# Patient Record
Sex: FEMALE | Race: WHITE | NOT HISPANIC OR LATINO
[De-identification: names, ages, dates, MRNs, and addresses within clinical notes are randomized per-mention and may not be internally consistent; named-entity substitution may affect disease eponyms.]

---

## 2017-01-10 ENCOUNTER — OTHER (OUTPATIENT)
Age: 42
End: 2017-01-10

## 2017-01-10 ENCOUNTER — APPOINTMENT (OUTPATIENT)
Dept: HEART AND VASCULAR | Facility: CLINIC | Age: 42
End: 2017-01-10

## 2017-01-10 VITALS
HEART RATE: 66 BPM | OXYGEN SATURATION: 99 % | BODY MASS INDEX: 18.81 KG/M2 | DIASTOLIC BLOOD PRESSURE: 62 MMHG | WEIGHT: 127 LBS | SYSTOLIC BLOOD PRESSURE: 100 MMHG | HEIGHT: 69 IN

## 2017-01-10 DIAGNOSIS — Z84.89 FAMILY HISTORY OF OTHER SPECIFIED CONDITIONS: ICD-10-CM

## 2017-01-10 DIAGNOSIS — F41.9 ANXIETY DISORDER, UNSPECIFIED: ICD-10-CM

## 2017-01-11 PROBLEM — Z84.89 FAMILY HISTORY OF SUDDEN DEATH: Status: ACTIVE | Noted: 2017-01-11

## 2017-01-11 PROBLEM — F41.9 ANXIETY: Status: RESOLVED | Noted: 2017-01-11 | Resolved: 2017-01-11

## 2017-01-30 ENCOUNTER — APPOINTMENT (OUTPATIENT)
Dept: HEART AND VASCULAR | Facility: CLINIC | Age: 42
End: 2017-01-30

## 2017-02-28 ENCOUNTER — APPOINTMENT (OUTPATIENT)
Dept: HEART AND VASCULAR | Facility: CLINIC | Age: 42
End: 2017-02-28

## 2017-03-07 ENCOUNTER — APPOINTMENT (OUTPATIENT)
Dept: HEART AND VASCULAR | Facility: CLINIC | Age: 42
End: 2017-03-07

## 2019-02-27 ENCOUNTER — APPOINTMENT (OUTPATIENT)
Dept: HEART AND VASCULAR | Facility: CLINIC | Age: 44
End: 2019-02-27
Payer: COMMERCIAL

## 2019-03-12 ENCOUNTER — APPOINTMENT (OUTPATIENT)
Dept: HEART AND VASCULAR | Facility: CLINIC | Age: 44
End: 2019-03-12
Payer: COMMERCIAL

## 2019-03-12 NOTE — PHYSICAL EXAM
[General Appearance - Well Developed] : well developed [Normal Appearance] : normal appearance [Well Groomed] : well groomed [General Appearance - Well Nourished] : well nourished [No Deformities] : no deformities [General Appearance - In No Acute Distress] : no acute distress [Normal Conjunctiva] : the conjunctiva exhibited no abnormalities [Eyelids - No Xanthelasma] : the eyelids demonstrated no xanthelasmas [Normal Jugular Venous A Waves Present] : normal jugular venous A waves present [Normal Jugular Venous V Waves Present] : normal jugular venous V waves present [No Jugular Venous Campos A Waves] : no jugular venous campos A waves [Respiration, Rhythm And Depth] : normal respiratory rhythm and effort [Exaggerated Use Of Accessory Muscles For Inspiration] : no accessory muscle use [Auscultation Breath Sounds / Voice Sounds] : lungs were clear to auscultation bilaterally [Heart Rate And Rhythm] : heart rate and rhythm were normal [Heart Sounds] : normal S1 and S2 [Murmurs] : no murmurs present [Abdomen Soft] : soft [Abdomen Tenderness] : non-tender [Abdomen Mass (___ Cm)] : no abdominal mass palpated [Abnormal Walk] : normal gait [Gait - Sufficient For Exercise Testing] : the gait was sufficient for exercise testing [Nail Clubbing] : no clubbing of the fingernails [Cyanosis, Localized] : no localized cyanosis [Petechial Hemorrhages (___cm)] : no petechial hemorrhages [Skin Color & Pigmentation] : normal skin color and pigmentation [] : no rash [No Venous Stasis] : no venous stasis [Skin Lesions] : no skin lesions [No Skin Ulcers] : no skin ulcer [No Xanthoma] : no  xanthoma was observed [Oriented To Time, Place, And Person] : oriented to person, place, and time [Affect] : the affect was normal [Mood] : the mood was normal [No Anxiety] : not feeling anxious

## 2019-03-12 NOTE — REASON FOR VISIT
[Chest Pain] : chest pain [Follow-Up - Clinic] : a clinic follow-up of [Hyperlipidemia] : hyperlipidemia [Hypertension] : hypertension

## 2019-03-13 ENCOUNTER — APPOINTMENT (OUTPATIENT)
Dept: HEART AND VASCULAR | Facility: CLINIC | Age: 44
End: 2019-03-13
Payer: COMMERCIAL

## 2019-03-13 NOTE — REASON FOR VISIT
[Follow-Up - Clinic] : a clinic follow-up of [Dyspnea] : dyspnea [Hyperlipidemia] : hyperlipidemia [Hypertension] : hypertension

## 2019-04-02 ENCOUNTER — NON-APPOINTMENT (OUTPATIENT)
Age: 44
End: 2019-04-02

## 2019-04-02 ENCOUNTER — APPOINTMENT (OUTPATIENT)
Dept: RHEUMATOLOGY | Facility: CLINIC | Age: 44
End: 2019-04-02
Payer: COMMERCIAL

## 2019-04-02 ENCOUNTER — APPOINTMENT (OUTPATIENT)
Dept: HEART AND VASCULAR | Facility: CLINIC | Age: 44
End: 2019-04-02
Payer: COMMERCIAL

## 2019-04-02 VITALS — OXYGEN SATURATION: 99 % | HEART RATE: 79 BPM | DIASTOLIC BLOOD PRESSURE: 73 MMHG | SYSTOLIC BLOOD PRESSURE: 113 MMHG

## 2019-04-02 VITALS
HEIGHT: 69 IN | HEART RATE: 79 BPM | BODY MASS INDEX: 20.29 KG/M2 | DIASTOLIC BLOOD PRESSURE: 72 MMHG | WEIGHT: 137 LBS | TEMPERATURE: 98.9 F | SYSTOLIC BLOOD PRESSURE: 110 MMHG | OXYGEN SATURATION: 99 %

## 2019-04-02 DIAGNOSIS — D50.9 IRON DEFICIENCY ANEMIA, UNSPECIFIED: ICD-10-CM

## 2019-04-02 DIAGNOSIS — D51.0 VITAMIN B12 DEFICIENCY ANEMIA DUE TO INTRINSIC FACTOR DEFICIENCY: ICD-10-CM

## 2019-04-02 DIAGNOSIS — Z86.2 PERSONAL HISTORY OF DISEASES OF THE BLOOD AND BLOOD-FORMING ORGANS AND CERTAIN DISORDERS INVOLVING THE IMMUNE MECHANISM: ICD-10-CM

## 2019-04-02 DIAGNOSIS — R76.8 OTHER SPECIFIED ABNORMAL IMMUNOLOGICAL FINDINGS IN SERUM: ICD-10-CM

## 2019-04-02 DIAGNOSIS — Z82.62 FAMILY HISTORY OF OSTEOPOROSIS: ICD-10-CM

## 2019-04-02 DIAGNOSIS — Z87.2 PERSONAL HISTORY OF DISEASES OF THE SKIN AND SUBCUTANEOUS TISSUE: ICD-10-CM

## 2019-04-02 DIAGNOSIS — Z82.49 FAMILY HISTORY OF ISCHEMIC HEART DISEASE AND OTHER DISEASES OF THE CIRCULATORY SYSTEM: ICD-10-CM

## 2019-04-02 DIAGNOSIS — K58.9 IRRITABLE BOWEL SYNDROME W/OUT DIARRHEA: ICD-10-CM

## 2019-04-02 DIAGNOSIS — Z78.9 OTHER SPECIFIED HEALTH STATUS: ICD-10-CM

## 2019-04-02 PROCEDURE — 99214 OFFICE O/P EST MOD 30 MIN: CPT

## 2019-04-02 PROCEDURE — 93000 ELECTROCARDIOGRAM COMPLETE: CPT

## 2019-04-02 PROCEDURE — 99205 OFFICE O/P NEW HI 60 MIN: CPT | Mod: 25

## 2019-04-02 PROCEDURE — 36415 COLL VENOUS BLD VENIPUNCTURE: CPT

## 2019-04-02 NOTE — ASSESSMENT
[FreeTextEntry1] : 45 y/o F with uncontrolled depression, anxiety and OCD, daily alcohol consumption was referred by Dermatology Dr. Fritz ([facility] ) \par She was seen by dermatology due to alopecia, which she reports non patchy, DELVIN was tested and reported positive, requested labs from PMD done  on 03/21. ROS: except nonpatchy alopecia is negative for underlying autoimmune diseases, pt with no arthralgia/arthritis, sicca symptoms, skin rash or photosensitivity. \par Exam is unremarkable\par DELVIN 1:40 \par Has normal ESR 16 and CRP <5 ( normal <10mg/dl) \par CCP 16 ( <20 negative) RF negative. Mild anemia with Hg 11.  \par Most likely alopecia is not related to any underlying autoimmune disease and I am suspecting stress induced. \par \par \par 1. The significance of positive DELVIN was discussed with the patient in great detail. I will check second tier serology as outlined below. Positive DELVIN may be seen in the setting of various autoimmune diseases including but not limited to SLE, rheumatoid arthritis, Sjogren's syndrome, autoimmune thyroid disease, scleroderma and others. DELVIN may  also be present at healthy individuals. \par 2. Recommended to contact PMD to discuss initiation of Iron supplements. \par 3. Alopecia: recommended hair and nail supplements and Biotin. \par 4. Vitamin D deficiency: no confirmatory lab, pt to send me most recent level and if low can start Ergocalciferol. \par 5. Depression and anxiety: recommended to consider starting Prozac as suggested to control her symptoms.\par 6. Alcohol dependence: we talked about alcohol related health problems, including but not limited to osteoporosis, liver disease and cirrhosis, heart diseases and ways to solve this problem, pt is self trying to cut down alcohol intake. \par \par Will see her back in 2 weeks to review labs. \par \par

## 2019-04-02 NOTE — REASON FOR VISIT
[Follow-Up - Clinic] : a clinic follow-up of [Chest Pain] : chest pain [Palpitations] : palpitations [FreeTextEntry1] : Presents secondary to chest discomfort on occasion. Notes activity with activity and rest and attributes them to anxiety. She remarks on a heart murmur noted by her doctor in [place]. No recent testing. Occasional palpitations noted. No syncope.

## 2019-04-02 NOTE — REVIEW OF SYSTEMS
[Feeling Tired] : feeling tired [Recent Weight Gain (___ Lbs)] : recent [unfilled] ~Ulb weight gain [Hoarseness] : hoarseness [Heart Rate Is Fast] : fast heart rate [Lower Ext Edema] : lower extremity edema [Constipation] : constipation [Anxiety] : anxiety [Depression] : depression [Fever] : no fever [Chills] : no chills [Feeling Poorly] : not feeling poorly [Recent Weight Loss (___ Lbs)] : no recent weight loss [Eye Pain] : no eye pain [Red Eyes] : eyes not red [Eyesight Problems] : no eyesight problems [Discharge From Eyes] : no purulent discharge from the eyes [Dry Eyes] : no dryness of the eyes [Eyes Itch] : no itching of the eyes [Earache] : no earache [Loss Of Hearing] : no hearing loss [Nosebleeds] : no nosebleeds [Nasal Discharge] : no nasal discharge [Sore Throat] : no sore throat [Heart Rate Is Slow] : the heart rate was not slow [Chest Pain] : no chest pain [Palpitations] : no palpitations [Leg Claudication] : no intermittent leg claudication [Shortness Of Breath] : no shortness of breath [Wheezing] : no wheezing [Cough] : no cough [SOB on Exertion] : no shortness of breath during exertion [Orthopnea] : no orthopnea [PND] : no PND [Abdominal Pain] : no abdominal pain [Vomiting] : no vomiting [Diarrhea] : no diarrhea [Arthralgias] : no arthralgias [Joint Pain] : no joint pain [Joint Swelling] : no joint swelling [Joint Stiffness] : no joint stiffness [Limb Pain] : no limb pain [Limb Swelling] : no limb swelling [Skin Lesions] : no skin lesions [Skin Wound] : no skin wound [Itching] : no itching [Change In A Mole] : no change in a mole [Confused] : no confusion [Convulsions] : no convulsions [Dizziness] : no dizziness [Fainting] : no fainting [Hot Flashes] : no hot flashes [Easy Bleeding] : no tendency for easy bleeding [Easy Bruising] : no tendency for easy bruising [Swollen Glands] : no swollen glands [Swollen Glands In The Neck] : no swollen glands in the neck

## 2019-04-02 NOTE — PHYSICAL EXAM
[General Appearance - Alert] : alert [General Appearance - In No Acute Distress] : in no acute distress [General Appearance - Well Nourished] : well nourished [General Appearance - Well Developed] : well developed [Sclera] : the sclera and conjunctiva were normal [PERRL With Normal Accommodation] : pupils were equal in size, round, and reactive to light [Extraocular Movements] : extraocular movements were intact [Outer Ear] : the ears and nose were normal in appearance [Hearing Threshold Finger Rub Not [place]] : hearing was normal [Examination Of The Oral Cavity] : the lips and gums were normal [Oropharynx] : the oropharynx was normal [Neck Appearance] : the appearance of the neck was normal [Neck Cervical Mass (___cm)] : no neck mass was observed [Jugular Venous Distention Increased] : there was no jugular-venous distention [Thyroid Diffuse Enlargement] : the thyroid was not enlarged [Thyroid Nodule] : there were no palpable thyroid nodules [Auscultation Breath Sounds / Voice Sounds] : lungs were clear to auscultation bilaterally [Heart Rate And Rhythm] : heart rate was normal and rhythm regular [Heart Sounds] : normal S1 and S2 [Murmurs] : no murmurs [Heart Sounds Pericardial Friction Rub] : no pericardial rub [Arterial Pulses Carotid] : carotid pulses were normal with no bruits [Edema] : there was no peripheral edema [Abdomen Soft] : soft [Abdomen Tenderness] : non-tender [Abdomen Mass (___ Cm)] : no abdominal mass palpated [Cervical Lymph Nodes Enlarged Posterior Bilaterally] : posterior cervical [Cervical Lymph Nodes Enlarged Anterior Bilaterally] : anterior cervical [Supraclavicular Lymph Nodes Enlarged Bilaterally] : supraclavicular [Abnormal Walk] : normal gait [Nail Clubbing] : no clubbing  or cyanosis of the fingernails [Musculoskeletal - Swelling] : no joint swelling seen [Motor Tone] : muscle strength and tone were normal [Skin Color & Pigmentation] : normal skin color and pigmentation [Skin Turgor] : normal skin turgor [] : no rash [Skin Lesions] : no skin lesions [Sensation] : the sensory exam was normal to light touch and pinprick [Motor Exam] : the motor exam was normal [Affect] : the affect was normal [Mood] : the mood was normal [FreeTextEntry1] : nail bed capillaries normal, not dilated. Hair loss, but no patchy alopecia.

## 2019-04-02 NOTE — HISTORY OF PRESENT ILLNESS
[Fatigue] : fatigue [Depression] : depression [Visual Changes] : visual changes [FreeTextEntry1] : 43 y/o F was referred by Dermatology Dr. Fritz ( [facility] ) \par PMD:  Dr. Claudio Walsh at [facility]. \par She was seen by dermatology due to alopecia, which she reports non patchy, DELVIN was tested and reported positive and recommended to see rheumatology. \par She was recently seen by Dr. Gomes for cardiac evaluation. \par Pt active problems are  depression and anxiety, OCD got worse since death of the brother few years ago, periodic panic attacks, she drinks alcohol daily (3-4 glass of wine daily)  seeing a therapist   recommended Prozac, but she has not yet decided. She has hx of pernicious anemia, iron deficiency anemia, IBS and GERD. Works part time as . \par ROS: denies patchy alopecia, photosensitivity, malar rash or other type of rash. NO Raynaud's disease, skin thickness or tightness. \par Denies arthralgia/myalgi. \par NO family hx of Autoimmune disease\par Mother has Osteoporosis and osteoporotic fracture. \par Reports had few times psoriatic plaques when she had stressful event in her life, but no recurrence for years.  [Anorexia] : no anorexia [Weight Loss] : no weight loss [Malaise] : no malaise [Fever] : no fever [Chills] : no chills [Malar Facial Rash] : no malar facial rash [Skin Lesions] : no lesions [Skin Nodules] : no skin nodules [Oral Ulcers] : no oral ulcers [Cough] : no cough [Dry Mouth] : no dry mouth [Dysphonia] : no dysphonia [Dysphagia] : no dysphagia [Shortness of Breath] : no shortness of breath [Chest Pain] : no chest pain [Arthralgias] : no arthralgias [Joint Swelling] : no joint swelling [Joint Warmth] : no joint warmth [Joint Deformity] : no joint deformity [Decreased ROM] : no decreased range of motion [Morning Stiffness] : no morning stiffness [Falls] : no falls [Difficulty Standing] : no difficulty standing [Difficulty Walking] : no difficulty walking [Myalgias] : no myalgias [Muscle Weakness] : no muscle weakness [Muscle Spasms] : no muscle spasms [Muscle Cramping] : no muscle cramping [Eye Pain] : no eye pain [Eye Redness] : no eye redness [Dry Eyes] : no dry eyes

## 2019-04-02 NOTE — PHYSICAL EXAM
[General Appearance - Well Developed] : well developed [Normal Appearance] : normal appearance [Well Groomed] : well groomed [General Appearance - Well Nourished] : well nourished [No Deformities] : no deformities [General Appearance - In No Acute Distress] : no acute distress [Normal Conjunctiva] : the conjunctiva exhibited no abnormalities [Eyelids - No Xanthelasma] : the eyelids demonstrated no xanthelasmas [Normal Jugular Venous A Waves Present] : normal jugular venous A waves present [Normal Jugular Venous V Waves Present] : normal jugular venous V waves present [No Jugular Venous Campos A Waves] : no jugular venous campos A waves [Respiration, Rhythm And Depth] : normal respiratory rhythm and effort [Exaggerated Use Of Accessory Muscles For Inspiration] : no accessory muscle use [Auscultation Breath Sounds / Voice Sounds] : lungs were clear to auscultation bilaterally [Heart Rate And Rhythm] : heart rate and rhythm were normal [Heart Sounds] : normal S1 and S2 [Abdomen Soft] : soft [Abdomen Tenderness] : non-tender [Abdomen Mass (___ Cm)] : no abdominal mass palpated [Abnormal Walk] : normal gait [Gait - Sufficient For Exercise Testing] : the gait was sufficient for exercise testing [Nail Clubbing] : no clubbing of the fingernails [Cyanosis, Localized] : no localized cyanosis [Petechial Hemorrhages (___cm)] : no petechial hemorrhages [Skin Color & Pigmentation] : normal skin color and pigmentation [] : no rash [No Venous Stasis] : no venous stasis [Skin Lesions] : no skin lesions [No Skin Ulcers] : no skin ulcer [No Xanthoma] : no  xanthoma was observed [Oriented To Time, Place, And Person] : oriented to person, place, and time [Affect] : the affect was normal [Mood] : the mood was normal [No Anxiety] : not feeling anxious [Normal Oral Mucosa] : normal oral mucosa [No Oral Pallor] : no oral pallor [No Oral Cyanosis] : no oral cyanosis [FreeTextEntry1] : BA 2/6

## 2019-04-03 LAB
APPEARANCE: CLEAR
BACTERIA: ABNORMAL
BILIRUBIN URINE: NEGATIVE
BLOOD URINE: ABNORMAL
C3 SERPL-MCNC: 103 MG/DL
C4 SERPL-MCNC: 18 MG/DL
COLOR: YELLOW
CREAT SPEC-SCNC: 167 MG/DL
CREAT/PROT UR: 0.1 RATIO
GLUCOSE QUALITATIVE U: NEGATIVE
HYALINE CASTS: 6 /LPF
KETONES URINE: ABNORMAL
LEUKOCYTE ESTERASE URINE: NEGATIVE
MICROSCOPIC-UA: NORMAL
NITRITE URINE: NEGATIVE
PH URINE: 5.5
PROT UR-MCNC: 20 MG/DL
PROTEIN URINE: NORMAL
RED BLOOD CELLS URINE: 5 /HPF
SPECIFIC GRAVITY URINE: 1.02
SQUAMOUS EPITHELIAL CELLS: 4 /HPF
UROBILINOGEN URINE: NORMAL
WHITE BLOOD CELLS URINE: 2 /HPF

## 2019-04-04 LAB
DSDNA AB SER-ACNC: <12 IU/ML
ENA RNP AB SER IA-ACNC: <0.2 AL
ENA SM AB SER IA-ACNC: <0.2 AL
ENA SS-A AB SER IA-ACNC: <0.2 AL
ENA SS-B AB SER IA-ACNC: <0.2 AL

## 2019-04-12 ENCOUNTER — APPOINTMENT (OUTPATIENT)
Dept: HEART AND VASCULAR | Facility: CLINIC | Age: 44
End: 2019-04-12

## 2019-04-23 ENCOUNTER — APPOINTMENT (OUTPATIENT)
Dept: RHEUMATOLOGY | Facility: CLINIC | Age: 44
End: 2019-04-23
Payer: COMMERCIAL

## 2019-04-23 VITALS
BODY MASS INDEX: 20.29 KG/M2 | OXYGEN SATURATION: 97 % | SYSTOLIC BLOOD PRESSURE: 128 MMHG | TEMPERATURE: 98.4 F | DIASTOLIC BLOOD PRESSURE: 82 MMHG | WEIGHT: 137 LBS | HEIGHT: 69 IN | HEART RATE: 83 BPM | RESPIRATION RATE: 16 BRPM

## 2019-04-23 DIAGNOSIS — L65.9 NONSCARRING HAIR LOSS, UNSPECIFIED: ICD-10-CM

## 2019-04-23 DIAGNOSIS — R76.8 OTHER SPECIFIED ABNORMAL IMMUNOLOGICAL FINDINGS IN SERUM: ICD-10-CM

## 2019-04-23 DIAGNOSIS — R31.29 OTHER MICROSCOPIC HEMATURIA: ICD-10-CM

## 2019-04-23 PROCEDURE — 99214 OFFICE O/P EST MOD 30 MIN: CPT

## 2019-04-23 NOTE — PHYSICAL EXAM
[General Appearance - Alert] : alert [General Appearance - In No Acute Distress] : in no acute distress [General Appearance - Well Nourished] : well nourished [General Appearance - Well Developed] : well developed [Sclera] : the sclera and conjunctiva were normal [PERRL With Normal Accommodation] : pupils were equal in size, round, and reactive to light [Extraocular Movements] : extraocular movements were intact [Outer Ear] : the ears and nose were normal in appearance [Hearing Threshold Finger Rub Not [place]] : hearing was normal [Examination Of The Oral Cavity] : the lips and gums were normal [Neck Appearance] : the appearance of the neck was normal [Oropharynx] : the oropharynx was normal [Jugular Venous Distention Increased] : there was no jugular-venous distention [Neck Cervical Mass (___cm)] : no neck mass was observed [Thyroid Diffuse Enlargement] : the thyroid was not enlarged [Thyroid Nodule] : there were no palpable thyroid nodules [Auscultation Breath Sounds / Voice Sounds] : lungs were clear to auscultation bilaterally [Heart Rate And Rhythm] : heart rate was normal and rhythm regular [Heart Sounds Pericardial Friction Rub] : no pericardial rub [Murmurs] : no murmurs [Heart Sounds] : normal S1 and S2 [Edema] : there was no peripheral edema [Arterial Pulses Carotid] : carotid pulses were normal with no bruits [Abdomen Tenderness] : non-tender [Abdomen Soft] : soft [Cervical Lymph Nodes Enlarged Posterior Bilaterally] : posterior cervical [Abdomen Mass (___ Cm)] : no abdominal mass palpated [Supraclavicular Lymph Nodes Enlarged Bilaterally] : supraclavicular [Cervical Lymph Nodes Enlarged Anterior Bilaterally] : anterior cervical [Nail Clubbing] : no clubbing  or cyanosis of the fingernails [Abnormal Walk] : normal gait [Musculoskeletal - Swelling] : no joint swelling seen [Skin Color & Pigmentation] : normal skin color and pigmentation [Motor Tone] : muscle strength and tone were normal [Skin Lesions] : no skin lesions [] : no rash [Skin Turgor] : normal skin turgor [Motor Exam] : the motor exam was normal [Sensation] : the sensory exam was normal to light touch and pinprick [Affect] : the affect was normal [Mood] : the mood was normal [FreeTextEntry1] : nail bed capillaries normal, not dilated. Hair loss, but no patchy alopecia.

## 2019-04-23 NOTE — ASSESSMENT
[FreeTextEntry1] : 45 y/o F with uncontrolled depression, anxiety and OCD, daily alcohol consumption was referred by Dermatology Dr. Fritz ([facility] ) \par She was seen by dermatology due to alopecia, which she reports non patchy, DELVIN was tested and reported positive, requested labs from PMD done  on 03/21. ROS: except nonpatchy alopecia is negative for underlying autoimmune diseases, pt with no arthralgia/arthritis, sicca symptoms, skin rash or photosensitivity. \par Exam is unremarkable\par DELVIN 1:40 \par Has normal ESR 16 and CRP <5 ( normal <10mg/dl) \par CCP 16 ( <20 negative) RF negative. Mild anemia with Hg 11.  \par Most likely alopecia is not related to any underlying autoimmune disease and I am suspecting stress induced  \par \par \par 1. The significance of positive DELVIN was discussed with the patient in great detail,  second tier serology such as C3,C4, ds-DNA, JESÚS and Ssa/Ssb all negative. \par Low positive titer with no disease can be seen in healthy individuals  in 10-15% and we talked about in detail about potential SLE or other autoimmune symptoms she need to watch and if develops to report immediately. \par 2. microhematuria: previous result most likely + during her period, will recheck UA and if again positive, refer to Nephrology or urology. \par 3. Alopecia: recommended hair and nail supplements and Biotin. \par 4. Vitamin D deficiency: no confirmatory lab, she should discuss Vit D level and treatment with PMD.  \par 5. Depression and anxiety: recommended to consider starting Prozac as suggested to control her symptoms.\par 6. Alcohol dependence: we talked about alcohol related health problems, including but not limited to osteoporosis, liver disease and cirrhosis, heart diseases and ways to solve this problem, pt is self trying to cut down alcohol intake. \par \par f/u in 6 month or earlier if any new symptoms\par \par

## 2019-04-23 NOTE — REVIEW OF SYSTEMS
[Feeling Tired] : feeling tired [Recent Weight Gain (___ Lbs)] : recent [unfilled] ~Ulb weight gain [Hoarseness] : hoarseness [Heart Rate Is Fast] : fast heart rate [Lower Ext Edema] : lower extremity edema [Constipation] : constipation [Anxiety] : anxiety [Depression] : depression [Chills] : no chills [Fever] : no fever [Recent Weight Loss (___ Lbs)] : no recent weight loss [Feeling Poorly] : not feeling poorly [Eyesight Problems] : no eyesight problems [Eye Pain] : no eye pain [Red Eyes] : eyes not red [Dry Eyes] : no dryness of the eyes [Discharge From Eyes] : no purulent discharge from the eyes [Earache] : no earache [Eyes Itch] : no itching of the eyes [Loss Of Hearing] : no hearing loss [Nosebleeds] : no nosebleeds [Nasal Discharge] : no nasal discharge [Heart Rate Is Slow] : the heart rate was not slow [Chest Pain] : no chest pain [Sore Throat] : no sore throat [Leg Claudication] : no intermittent leg claudication [Shortness Of Breath] : no shortness of breath [Palpitations] : no palpitations [Wheezing] : no wheezing [Cough] : no cough [Orthopnea] : no orthopnea [SOB on Exertion] : no shortness of breath during exertion [Abdominal Pain] : no abdominal pain [PND] : no PND [Arthralgias] : no arthralgias [Diarrhea] : no diarrhea [Vomiting] : no vomiting [Joint Pain] : no joint pain [Joint Stiffness] : no joint stiffness [Joint Swelling] : no joint swelling [Limb Pain] : no limb pain [Limb Swelling] : no limb swelling [Skin Lesions] : no skin lesions [Itching] : no itching [Skin Wound] : no skin wound [Confused] : no confusion [Change In A Mole] : no change in a mole [Dizziness] : no dizziness [Convulsions] : no convulsions [Hot Flashes] : no hot flashes [Fainting] : no fainting [Easy Bruising] : no tendency for easy bruising [Easy Bleeding] : no tendency for easy bleeding [Swollen Glands] : no swollen glands [Swollen Glands In The Neck] : no swollen glands in the neck

## 2019-04-23 NOTE — HISTORY OF PRESENT ILLNESS
[Fatigue] : fatigue [Depression] : depression [Visual Changes] : visual changes [FreeTextEntry1] : Follow up: 04/23/19 \par All Rheum and second tier SLE labs negative. \par Noted small amount of RBC and blood in urine, she thinks may had a period when UA was done. \par Pending cardiac work up. \par \par Initial consult: 04/02/19\par 43 y/o F was referred by Dermatology Dr. Fritz ( [facility] ) \par PMD:  Dr. Claudio Walsh at [facility]. \par She was seen by dermatology due to alopecia, which she reports non patchy, DELVIN was tested and reported positive and recommended to see rheumatology. \par She was recently seen by Dr. Gomes for cardiac evaluation. \par Pt active problems are  depression and anxiety, OCD got worse since death of the brother few years ago, periodic panic attacks, she drinks alcohol daily (3-4 glass of wine daily)  seeing a therapist   recommended Prozac, but she has not yet decided. She has hx of pernicious anemia, iron deficiency anemia, IBS and GERD. Works part time as . \par ROS: denies patchy alopecia, photosensitivity, malar rash or other type of rash. NO Raynaud's disease, skin thickness or tightness. \par Denies arthralgia/myalgi. \par NO family hx of Autoimmune disease\par Mother has Osteoporosis and osteoporotic fracture. \par Reports had few times psoriatic plaques when she had stressful event in her life, but no recurrence for years.  [Anorexia] : no anorexia [Malaise] : no malaise [Weight Loss] : no weight loss [Chills] : no chills [Malar Facial Rash] : no malar facial rash [Fever] : no fever [Skin Nodules] : no skin nodules [Skin Lesions] : no lesions [Dry Mouth] : no dry mouth [Cough] : no cough [Oral Ulcers] : no oral ulcers [Dysphonia] : no dysphonia [Dysphagia] : no dysphagia [Shortness of Breath] : no shortness of breath [Chest Pain] : no chest pain [Arthralgias] : no arthralgias [Joint Deformity] : no joint deformity [Joint Warmth] : no joint warmth [Joint Swelling] : no joint swelling [Morning Stiffness] : no morning stiffness [Falls] : no falls [Decreased ROM] : no decreased range of motion [Difficulty Standing] : no difficulty standing [Difficulty Walking] : no difficulty walking [Myalgias] : no myalgias [Muscle Spasms] : no muscle spasms [Muscle Weakness] : no muscle weakness [Muscle Cramping] : no muscle cramping [Eye Pain] : no eye pain [Dry Eyes] : no dry eyes [Eye Redness] : no eye redness

## 2019-04-25 ENCOUNTER — RESULT REVIEW (OUTPATIENT)
Age: 44
End: 2019-04-25

## 2019-04-26 LAB
APPEARANCE: CLEAR
BACTERIA: ABNORMAL
BILIRUBIN URINE: NEGATIVE
BLOOD URINE: NORMAL
COLOR: NORMAL
GLUCOSE QUALITATIVE U: NEGATIVE
HYALINE CASTS: 1 /LPF
KETONES URINE: NEGATIVE
LEUKOCYTE ESTERASE URINE: NEGATIVE
MICROSCOPIC-UA: NORMAL
NITRITE URINE: NEGATIVE
PH URINE: 7
PROTEIN URINE: NEGATIVE
RED BLOOD CELLS URINE: 5 /HPF
SPECIFIC GRAVITY URINE: 1.01
SQUAMOUS EPITHELIAL CELLS: 2 /HPF
UROBILINOGEN URINE: NORMAL
WHITE BLOOD CELLS URINE: 1 /HPF

## 2019-05-17 ENCOUNTER — APPOINTMENT (OUTPATIENT)
Dept: HEART AND VASCULAR | Facility: CLINIC | Age: 44
End: 2019-05-17
Payer: COMMERCIAL

## 2019-05-17 ENCOUNTER — OUTPATIENT (OUTPATIENT)
Dept: OUTPATIENT SERVICES | Facility: HOSPITAL | Age: 44
LOS: 1 days | End: 2019-05-17

## 2019-05-17 ENCOUNTER — APPOINTMENT (OUTPATIENT)
Dept: ULTRASOUND IMAGING | Facility: CLINIC | Age: 44
End: 2019-05-17
Payer: COMMERCIAL

## 2019-05-17 VITALS — DIASTOLIC BLOOD PRESSURE: 78 MMHG | SYSTOLIC BLOOD PRESSURE: 117 MMHG

## 2019-05-17 PROCEDURE — 76770 US EXAM ABDO BACK WALL COMP: CPT | Mod: 26

## 2019-05-17 PROCEDURE — 93306 TTE W/DOPPLER COMPLETE: CPT

## 2019-05-20 ENCOUNTER — TRANSCRIPTION ENCOUNTER (OUTPATIENT)
Age: 44
End: 2019-05-20

## 2019-10-08 ENCOUNTER — APPOINTMENT (OUTPATIENT)
Dept: RHEUMATOLOGY | Facility: CLINIC | Age: 44
End: 2019-10-08

## 2020-08-28 ENCOUNTER — NON-APPOINTMENT (OUTPATIENT)
Age: 45
End: 2020-08-28

## 2020-08-28 ENCOUNTER — EMERGENCY (EMERGENCY)
Facility: HOSPITAL | Age: 45
LOS: 1 days | Discharge: HOME | End: 2020-08-28
Admitting: EMERGENCY MEDICINE
Payer: COMMERCIAL

## 2020-08-28 ENCOUNTER — APPOINTMENT (OUTPATIENT)
Dept: HEART AND VASCULAR | Facility: CLINIC | Age: 45
End: 2020-08-28
Payer: COMMERCIAL

## 2020-08-28 VITALS
HEART RATE: 99 BPM | DIASTOLIC BLOOD PRESSURE: 88 MMHG | RESPIRATION RATE: 15 BRPM | SYSTOLIC BLOOD PRESSURE: 147 MMHG | HEIGHT: 69 IN | OXYGEN SATURATION: 98 % | WEIGHT: 132.28 LBS | TEMPERATURE: 98 F

## 2020-08-28 VITALS
DIASTOLIC BLOOD PRESSURE: 91 MMHG | HEART RATE: 92 BPM | HEIGHT: 69 IN | OXYGEN SATURATION: 98 % | SYSTOLIC BLOOD PRESSURE: 139 MMHG

## 2020-08-28 VITALS
SYSTOLIC BLOOD PRESSURE: 125 MMHG | RESPIRATION RATE: 20 BRPM | DIASTOLIC BLOOD PRESSURE: 82 MMHG | TEMPERATURE: 99 F | OXYGEN SATURATION: 97 % | HEART RATE: 97 BPM

## 2020-08-28 LAB
ALBUMIN SERPL ELPH-MCNC: 4.2 G/DL — SIGNIFICANT CHANGE UP (ref 3.4–5)
ALP SERPL-CCNC: 54 U/L — SIGNIFICANT CHANGE UP (ref 40–120)
ALT FLD-CCNC: 72 U/L — HIGH (ref 12–42)
ANION GAP SERPL CALC-SCNC: 12 MMOL/L — SIGNIFICANT CHANGE UP (ref 9–16)
AST SERPL-CCNC: 137 U/L — HIGH (ref 15–37)
BASOPHILS # BLD AUTO: 0.05 K/UL — SIGNIFICANT CHANGE UP (ref 0–0.2)
BASOPHILS NFR BLD AUTO: 0.9 % — SIGNIFICANT CHANGE UP (ref 0–2)
BILIRUB SERPL-MCNC: 0.8 MG/DL — SIGNIFICANT CHANGE UP (ref 0.2–1.2)
BUN SERPL-MCNC: 13 MG/DL — SIGNIFICANT CHANGE UP (ref 7–23)
CALCIUM SERPL-MCNC: 9.3 MG/DL — SIGNIFICANT CHANGE UP (ref 8.5–10.5)
CHLORIDE SERPL-SCNC: 98 MMOL/L — SIGNIFICANT CHANGE UP (ref 96–108)
CO2 SERPL-SCNC: 26 MMOL/L — SIGNIFICANT CHANGE UP (ref 22–31)
CREAT SERPL-MCNC: 0.68 MG/DL — SIGNIFICANT CHANGE UP (ref 0.5–1.3)
EOSINOPHIL # BLD AUTO: 0.01 K/UL — SIGNIFICANT CHANGE UP (ref 0–0.5)
EOSINOPHIL NFR BLD AUTO: 0.2 % — SIGNIFICANT CHANGE UP (ref 0–6)
GLUCOSE SERPL-MCNC: 107 MG/DL — HIGH (ref 70–99)
HCT VFR BLD CALC: 33.3 % — LOW (ref 34.5–45)
HGB BLD-MCNC: 11 G/DL — LOW (ref 11.5–15.5)
IMM GRANULOCYTES NFR BLD AUTO: 0.5 % — SIGNIFICANT CHANGE UP (ref 0–1.5)
LIDOCAIN IGE QN: 151 U/L — SIGNIFICANT CHANGE UP (ref 73–393)
LYMPHOCYTES # BLD AUTO: 0.52 K/UL — LOW (ref 1–3.3)
LYMPHOCYTES # BLD AUTO: 9.4 % — LOW (ref 13–44)
MAGNESIUM SERPL-MCNC: 1.7 MG/DL — SIGNIFICANT CHANGE UP (ref 1.6–2.6)
MCHC RBC-ENTMCNC: 31.3 PG — SIGNIFICANT CHANGE UP (ref 27–34)
MCHC RBC-ENTMCNC: 33 GM/DL — SIGNIFICANT CHANGE UP (ref 32–36)
MCV RBC AUTO: 94.6 FL — SIGNIFICANT CHANGE UP (ref 80–100)
MONOCYTES # BLD AUTO: 0.39 K/UL — SIGNIFICANT CHANGE UP (ref 0–0.9)
MONOCYTES NFR BLD AUTO: 7 % — SIGNIFICANT CHANGE UP (ref 2–14)
NEUTROPHILS # BLD AUTO: 4.55 K/UL — SIGNIFICANT CHANGE UP (ref 1.8–7.4)
NEUTROPHILS NFR BLD AUTO: 82 % — HIGH (ref 43–77)
NRBC # BLD: 0 /100 WBCS — SIGNIFICANT CHANGE UP (ref 0–0)
PLATELET # BLD AUTO: 217 K/UL — SIGNIFICANT CHANGE UP (ref 150–400)
POTASSIUM SERPL-MCNC: 4.5 MMOL/L — SIGNIFICANT CHANGE UP (ref 3.5–5.3)
POTASSIUM SERPL-SCNC: 4.5 MMOL/L — SIGNIFICANT CHANGE UP (ref 3.5–5.3)
PROT SERPL-MCNC: 8.3 G/DL — HIGH (ref 6.4–8.2)
RBC # BLD: 3.52 M/UL — LOW (ref 3.8–5.2)
RBC # FLD: 15.3 % — HIGH (ref 10.3–14.5)
SODIUM SERPL-SCNC: 136 MMOL/L — SIGNIFICANT CHANGE UP (ref 132–145)
WBC # BLD: 5.55 K/UL — SIGNIFICANT CHANGE UP (ref 3.8–10.5)
WBC # FLD AUTO: 5.55 K/UL — SIGNIFICANT CHANGE UP (ref 3.8–10.5)

## 2020-08-28 PROCEDURE — 76705 ECHO EXAM OF ABDOMEN: CPT | Mod: 26

## 2020-08-28 PROCEDURE — 99284 EMERGENCY DEPT VISIT MOD MDM: CPT

## 2020-08-28 PROCEDURE — 93010 ELECTROCARDIOGRAM REPORT: CPT

## 2020-08-28 PROCEDURE — 71046 X-RAY EXAM CHEST 2 VIEWS: CPT | Mod: 26

## 2020-08-28 PROCEDURE — 99215 OFFICE O/P EST HI 40 MIN: CPT

## 2020-08-28 RX ORDER — SODIUM CHLORIDE 9 MG/ML
1000 INJECTION INTRAMUSCULAR; INTRAVENOUS; SUBCUTANEOUS ONCE
Refills: 0 | Status: COMPLETED | OUTPATIENT
Start: 2020-08-28 | End: 2020-08-28

## 2020-08-28 RX ORDER — ONDANSETRON 8 MG/1
4 TABLET, FILM COATED ORAL ONCE
Refills: 0 | Status: COMPLETED | OUTPATIENT
Start: 2020-08-28 | End: 2020-08-28

## 2020-08-28 RX ORDER — ONDANSETRON 8 MG/1
1 TABLET, FILM COATED ORAL
Qty: 9 | Refills: 0
Start: 2020-08-28 | End: 2020-08-30

## 2020-08-28 RX ORDER — AMOXICILLIN 875 MG/1
TABLET, FILM COATED ORAL
Refills: 0 | Status: COMPLETED | COMMUNITY
End: 2020-08-28

## 2020-08-28 RX ADMIN — SODIUM CHLORIDE 2000 MILLILITER(S): 9 INJECTION INTRAMUSCULAR; INTRAVENOUS; SUBCUTANEOUS at 13:21

## 2020-08-28 RX ADMIN — Medication 50 MILLIGRAM(S): at 15:47

## 2020-08-28 RX ADMIN — ONDANSETRON 4 MILLIGRAM(S): 8 TABLET, FILM COATED ORAL at 13:22

## 2020-08-28 RX ADMIN — Medication 1 MILLIGRAM(S): at 13:20

## 2020-08-28 NOTE — DISCUSSION/SUMMARY
[FreeTextEntry1] : CP/Palps ekg reviewed, likely GI in origin, will schedule for echo, will give info for PMD. Given, her state and how tremulous she is, recommend a visit for further evaluation at [facility] as I'm concerned with alcohol withdrawal as well. Patient given info, follow up and echo scheduled. Emotional support provided.\par Case discussed with team at [facility]. Patient is currently in ER, room 12 being evaluate.

## 2020-08-28 NOTE — ED ADULT NURSE NOTE - CHIEF COMPLAINT
The patient is a 45y Female complaining of vomiting. patient walk in from MD Gomes office ; LUQ abd with nausea and vomiting today; admits to heavy ETOH use and last drink yesterday; "drinks a lot"; visibly shaking in triage

## 2020-08-28 NOTE — ED ADULT NURSE NOTE - CHPI ED NUR SYMPTOMS NEG
no vomiting/no tingling/no fever/no nausea/no weakness/no dizziness/no decreased eating/drinking/no pain/no chills

## 2020-08-28 NOTE — REASON FOR VISIT
[Follow-Up - Clinic] : a clinic follow-up of [FreeTextEntry1] : Patient was last seen in the office in April of 2019. She comes in for a follow up and appears to be in acute distress. She remarks on midsternal chest discomfort. She admits to associated nausea and vomiting. She appears pale and diaphoretic. She has been working with a GI doctor and was given a script for a PPI. She is not taking the medication. In addition, she admits to overindulging on wine lately.  [Chest Pain] : chest pain

## 2020-08-28 NOTE — ED PROVIDER NOTE - PATIENT PORTAL LINK FT
You can access the FollowMyHealth Patient Portal offered by [facility] by registering at the following website: [email or web address]. By joining [employer]’s FollowMyHealth portal, you will also be able to view your health information using other applications (apps) compatible with our system.

## 2020-08-28 NOTE — ED PROVIDER NOTE - CLINICAL SUMMARY MEDICAL DECISION MAKING FREE TEXT BOX
44 y/o F presenting with N/V and L sided chest wall pain. On exam, Pt appears anxious and tremulous. Suspect symptoms are related to EtOH withdrawal. Plan for IV hydration, Ativan, Zofran, labs, and continued monitoring. Will reassess afterwards.

## 2020-08-28 NOTE — PHYSICAL EXAM
[General Appearance - Well Developed] : well developed [Normal Appearance] : normal appearance [General Appearance - Well Nourished] : well nourished [Well Groomed] : well groomed [No Deformities] : no deformities [General Appearance - In No Acute Distress] : no acute distress [Eyelids - No Xanthelasma] : the eyelids demonstrated no xanthelasmas [Normal Conjunctiva] : the conjunctiva exhibited no abnormalities [Normal Oral Mucosa] : normal oral mucosa [No Oral Pallor] : no oral pallor [No Oral Cyanosis] : no oral cyanosis [Normal Jugular Venous A Waves Present] : normal jugular venous A waves present [Normal Jugular Venous V Waves Present] : normal jugular venous V waves present [No Jugular Venous Campos A Waves] : no jugular venous campos A waves [Exaggerated Use Of Accessory Muscles For Inspiration] : no accessory muscle use [Respiration, Rhythm And Depth] : normal respiratory rhythm and effort [Auscultation Breath Sounds / Voice Sounds] : lungs were clear to auscultation bilaterally [Heart Rate And Rhythm] : heart rate and rhythm were normal [Heart Sounds] : normal S1 and S2 [FreeTextEntry1] : BA 2/6 [Abdomen Soft] : soft [Abdomen Tenderness] : non-tender [Abdomen Mass (___ Cm)] : no abdominal mass palpated [Abnormal Walk] : normal gait [Gait - Sufficient For Exercise Testing] : the gait was sufficient for exercise testing [Nail Clubbing] : no clubbing of the fingernails [Petechial Hemorrhages (___cm)] : no petechial hemorrhages [Cyanosis, Localized] : no localized cyanosis [Skin Color & Pigmentation] : normal skin color and pigmentation [] : no rash [No Venous Stasis] : no venous stasis [No Skin Ulcers] : no skin ulcer [Skin Lesions] : no skin lesions [No Xanthoma] : no  xanthoma was observed [Oriented To Time, Place, And Person] : oriented to person, place, and time [No Anxiety] : not feeling anxious [Affect] : the affect was normal [Mood] : the mood was normal

## 2020-08-28 NOTE — ED ADULT TRIAGE NOTE - CHIEF COMPLAINT QUOTE
patient walk in from [facility] office ; JOSE ANTONIO abd with nausea and vomiting today; admits to heavy ETOH use and last drink yesterday; "drinks a lot"; visibly shaking in triage

## 2020-08-28 NOTE — ED PROVIDER NOTE - OBJECTIVE STATEMENT
44 y/o F with PMHx of anxiety, depression, and heavy alcohol use presents to the ED for N/V x1 week. Pt admits to drinking alcohol 2-3 daily (last drink at 10 AM this morning). Today, she was experiencing L sided chest wall pain and decided to see her cardiologist. She was then referred to the ED for further evaluation. Pt also endorses feeling anxious and tremulous. Pt also admits to Hx of alcohol withdrawal but denies Hx of seizures. She denies fevers, chills, and diaphoresis.

## 2020-09-16 ENCOUNTER — APPOINTMENT (OUTPATIENT)
Dept: HEART AND VASCULAR | Facility: CLINIC | Age: 45
End: 2020-09-16

## 2020-11-01 ENCOUNTER — EMERGENCY (EMERGENCY)
Facility: HOSPITAL | Age: 45
LOS: 1 days | Discharge: HOME | End: 2020-11-01
Admitting: EMERGENCY MEDICINE
Payer: COMMERCIAL

## 2020-11-01 VITALS
TEMPERATURE: 98 F | HEART RATE: 90 BPM | HEIGHT: 69 IN | OXYGEN SATURATION: 98 % | WEIGHT: 143.08 LBS | RESPIRATION RATE: 18 BRPM | DIASTOLIC BLOOD PRESSURE: 86 MMHG | SYSTOLIC BLOOD PRESSURE: 149 MMHG

## 2020-11-01 DIAGNOSIS — Z79.899 OTHER LONG TERM (CURRENT) DRUG THERAPY: ICD-10-CM

## 2020-11-01 DIAGNOSIS — F10.10 ALCOHOL ABUSE, UNCOMPLICATED: ICD-10-CM

## 2020-11-01 DIAGNOSIS — R11.2 NAUSEA WITH VOMITING, UNSPECIFIED: ICD-10-CM

## 2020-11-01 PROBLEM — F32.9 MAJOR DEPRESSIVE DISORDER, SINGLE EPISODE, UNSPECIFIED: Chronic | Status: ACTIVE | Noted: 2020-08-28

## 2020-11-01 LAB
ALBUMIN SERPL ELPH-MCNC: 3.8 G/DL — SIGNIFICANT CHANGE UP (ref 3.4–5)
ALP SERPL-CCNC: 44 U/L — SIGNIFICANT CHANGE UP (ref 40–120)
ALT FLD-CCNC: 57 U/L — HIGH (ref 12–42)
ANION GAP SERPL CALC-SCNC: 11 MMOL/L — SIGNIFICANT CHANGE UP (ref 9–16)
APPEARANCE UR: CLEAR — SIGNIFICANT CHANGE UP
AST SERPL-CCNC: 94 U/L — HIGH (ref 15–37)
BACTERIA # UR AUTO: PRESENT /HPF
BASOPHILS # BLD AUTO: 0.05 K/UL — SIGNIFICANT CHANGE UP (ref 0–0.2)
BASOPHILS NFR BLD AUTO: 1 % — SIGNIFICANT CHANGE UP (ref 0–2)
BILIRUB SERPL-MCNC: 0.4 MG/DL — SIGNIFICANT CHANGE UP (ref 0.2–1.2)
BILIRUB UR-MCNC: ABNORMAL
BUN SERPL-MCNC: 13 MG/DL — SIGNIFICANT CHANGE UP (ref 7–23)
CALCIUM SERPL-MCNC: 9.3 MG/DL — SIGNIFICANT CHANGE UP (ref 8.5–10.5)
CHLORIDE SERPL-SCNC: 100 MMOL/L — SIGNIFICANT CHANGE UP (ref 96–108)
CO2 SERPL-SCNC: 26 MMOL/L — SIGNIFICANT CHANGE UP (ref 22–31)
COLOR SPEC: YELLOW — SIGNIFICANT CHANGE UP
CREAT SERPL-MCNC: 0.54 MG/DL — SIGNIFICANT CHANGE UP (ref 0.5–1.3)
DIFF PNL FLD: ABNORMAL
EOSINOPHIL # BLD AUTO: 0 K/UL — SIGNIFICANT CHANGE UP (ref 0–0.5)
EOSINOPHIL NFR BLD AUTO: 0 % — SIGNIFICANT CHANGE UP (ref 0–6)
EPI CELLS # UR: ABNORMAL /HPF (ref 0–5)
ETHANOL SERPL-MCNC: <3 MG/DL — SIGNIFICANT CHANGE UP
GLUCOSE SERPL-MCNC: 97 MG/DL — SIGNIFICANT CHANGE UP (ref 70–99)
GLUCOSE UR QL: NEGATIVE — SIGNIFICANT CHANGE UP
HCT VFR BLD CALC: 31.7 % — LOW (ref 34.5–45)
HGB BLD-MCNC: 10.1 G/DL — LOW (ref 11.5–15.5)
IMM GRANULOCYTES NFR BLD AUTO: 0.4 % — SIGNIFICANT CHANGE UP (ref 0–1.5)
KETONES UR-MCNC: >=80 MG/DL
LEUKOCYTE ESTERASE UR-ACNC: NEGATIVE — SIGNIFICANT CHANGE UP
LIDOCAIN IGE QN: 99 U/L — SIGNIFICANT CHANGE UP (ref 73–393)
LYMPHOCYTES # BLD AUTO: 0.61 K/UL — LOW (ref 1–3.3)
LYMPHOCYTES # BLD AUTO: 12.2 % — LOW (ref 13–44)
MCHC RBC-ENTMCNC: 31.1 PG — SIGNIFICANT CHANGE UP (ref 27–34)
MCHC RBC-ENTMCNC: 31.9 GM/DL — LOW (ref 32–36)
MCV RBC AUTO: 97.5 FL — SIGNIFICANT CHANGE UP (ref 80–100)
MONOCYTES # BLD AUTO: 0.33 K/UL — SIGNIFICANT CHANGE UP (ref 0–0.9)
MONOCYTES NFR BLD AUTO: 6.6 % — SIGNIFICANT CHANGE UP (ref 2–14)
NEUTROPHILS # BLD AUTO: 4 K/UL — SIGNIFICANT CHANGE UP (ref 1.8–7.4)
NEUTROPHILS NFR BLD AUTO: 79.8 % — HIGH (ref 43–77)
NITRITE UR-MCNC: NEGATIVE — SIGNIFICANT CHANGE UP
NRBC # BLD: 0 /100 WBCS — SIGNIFICANT CHANGE UP (ref 0–0)
PH UR: 6 — SIGNIFICANT CHANGE UP (ref 5–8)
PLATELET # BLD AUTO: 306 K/UL — SIGNIFICANT CHANGE UP (ref 150–400)
POTASSIUM SERPL-MCNC: 4 MMOL/L — SIGNIFICANT CHANGE UP (ref 3.5–5.3)
POTASSIUM SERPL-SCNC: 4 MMOL/L — SIGNIFICANT CHANGE UP (ref 3.5–5.3)
PROT SERPL-MCNC: 7.9 G/DL — SIGNIFICANT CHANGE UP (ref 6.4–8.2)
PROT UR-MCNC: ABNORMAL MG/DL
RBC # BLD: 3.25 M/UL — LOW (ref 3.8–5.2)
RBC # FLD: 14.7 % — HIGH (ref 10.3–14.5)
RBC CASTS # UR COMP ASSIST: ABNORMAL /HPF
SODIUM SERPL-SCNC: 137 MMOL/L — SIGNIFICANT CHANGE UP (ref 132–145)
SP GR SPEC: >=1.03 — SIGNIFICANT CHANGE UP (ref 1–1.03)
UROBILINOGEN FLD QL: 0.2 E.U./DL — SIGNIFICANT CHANGE UP
WBC # BLD: 5.01 K/UL — SIGNIFICANT CHANGE UP (ref 3.8–10.5)
WBC # FLD AUTO: 5.01 K/UL — SIGNIFICANT CHANGE UP (ref 3.8–10.5)

## 2020-11-01 PROCEDURE — 99284 EMERGENCY DEPT VISIT MOD MDM: CPT

## 2020-11-01 RX ORDER — IBUPROFEN 200 MG
600 TABLET ORAL ONCE
Refills: 0 | Status: COMPLETED | OUTPATIENT
Start: 2020-11-01 | End: 2020-11-01

## 2020-11-01 RX ORDER — ACETAMINOPHEN 500 MG
650 TABLET ORAL ONCE
Refills: 0 | Status: DISCONTINUED | OUTPATIENT
Start: 2020-11-01 | End: 2020-11-01

## 2020-11-01 RX ORDER — SODIUM CHLORIDE 9 MG/ML
1000 INJECTION INTRAMUSCULAR; INTRAVENOUS; SUBCUTANEOUS ONCE
Refills: 0 | Status: COMPLETED | OUTPATIENT
Start: 2020-11-01 | End: 2020-11-01

## 2020-11-01 RX ORDER — METOCLOPRAMIDE HCL 10 MG
10 TABLET ORAL ONCE
Refills: 0 | Status: COMPLETED | OUTPATIENT
Start: 2020-11-01 | End: 2020-11-01

## 2020-11-01 RX ORDER — ONDANSETRON 8 MG/1
4 TABLET, FILM COATED ORAL ONCE
Refills: 0 | Status: COMPLETED | OUTPATIENT
Start: 2020-11-01 | End: 2020-11-01

## 2020-11-01 RX ADMIN — Medication 10 MILLIGRAM(S): at 13:47

## 2020-11-01 RX ADMIN — SODIUM CHLORIDE 1000 MILLILITER(S): 9 INJECTION INTRAMUSCULAR; INTRAVENOUS; SUBCUTANEOUS at 15:42

## 2020-11-01 RX ADMIN — ONDANSETRON 4 MILLIGRAM(S): 8 TABLET, FILM COATED ORAL at 13:47

## 2020-11-01 RX ADMIN — Medication 600 MILLIGRAM(S): at 15:50

## 2020-11-01 RX ADMIN — SODIUM CHLORIDE 1000 MILLILITER(S): 9 INJECTION INTRAMUSCULAR; INTRAVENOUS; SUBCUTANEOUS at 13:47
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
## 2020-11-01 NOTE — ED ADULT NURSE NOTE - OBJECTIVE STATEMENT
Patient, 45 ye, female, came to this unit c/o  nausea and vomiting after a night of drinking alcohol.

## 2020-11-01 NOTE — ED PROVIDER NOTE - PHYSICAL EXAMINATION
CONSTITUTIONAL: Well-appearing; well-nourished; in no apparent distress.   	HEAD: Normocephalic; atraumatic.   	EYES:  conjunctiva and sclera clear  	ENT: normal nose; no rhinorrhea; normal pharynx with no erythema or lesions.   	NECK: Supple; non-tender;   	CARDIOVASCULAR: Normal S1, S2; no murmurs, rubs, or gallops. Regular rate and rhythm.   	RESPIRATORY: Breathing easily; breath sounds clear and equal bilaterally; no wheezes, rhonchi, or rales.  	GI: Soft; non-distended; non-tender; no palpable organomegaly.   	EXT: No cyanosis or edema; N/V intact  	SKIN: Normal for age and race; warm; dry; good turgor; no apparent lesions or rash.   	NEURO: A & O x 3; face symmetric; grossly unremarkable.   PSYCHOLOGICAL: The patient’s mood and manner are appropriate. CONSTITUTIONAL: Well-appearing; well-nourished; in no apparent distress.   	HEAD: Normocephalic; atraumatic.   	EYES:  conjunctiva and sclera clear  	ENT: normal nose; no rhinorrhea; normal pharynx with no erythema or lesions.   	NECK: Supple; non-tender;   	CARDIOVASCULAR: Normal S1, S2; no murmurs, rubs, or gallops. Regular rate and rhythm.   	RESPIRATORY: Breathing easily; breath sounds clear and equal bilaterally; no wheezes, rhonchi, or rales.  	GI: Soft; non-distended; non-tender  	EXT: No cyanosis or edema; N/V intact  	SKIN: Normal for age and race; warm; dry; good turgor; no apparent lesions or rash.   	NEURO: A & O x 3; face symmetric; grossly unremarkable. strength 5/5 x 4. ambulatory  PSYCHOLOGICAL: The patient’s mood and manner are appropriate.

## 2020-11-01 NOTE — ED ADULT NURSE REASSESSMENT NOTE - NS ED NURSE REASSESS COMMENT FT1
Pt resting in chair at this time. Pt still complaining of headache but states that nausea has improved. Pending urine. Will continue to monitor.

## 2020-11-01 NOTE — ED PROVIDER NOTE - CLINICAL SUMMARY MEDICAL DECISION MAKING FREE TEXT BOX
alcohol abuse c/o vomiting, now improved, tolerating po, well appearing, NAD, labs reviewed, not in withdrawal, advised to go to detox

## 2020-11-01 NOTE — ED PROVIDER NOTE - OBJECTIVE STATEMENT
46 y/o female with hx of ETOH abuse, anxiety, and depression presents to ED c/o nausea and vomiting after heavy ETOH intake yesterday. Denies abdominal pain, diarrhea, and fever. 46 y/o female with hx of alcohol abuse, anxiety, and depression presents to ED c/o nausea and vomiting after heavy alcohol intake yesterday. Denies abdominal pain, diarrhea, and fever.

## 2020-11-01 NOTE — ED PROVIDER NOTE - NSFOLLOWUPINSTRUCTIONS_ED_ALL_ED_FT
Attend detox services as discussed.     Return to the Emergency Department for any concerns or worsening symptoms.     YOU MAY ALSO CALL [phone] AND ASK FOR MS. TIMA CARCAMO.  SHE CAN HELP YOU with detox resources. HER HOURS ARE 11AM-7PM MONDAY - FRIDAY. ALTERNATIVELY YOU CAN CALL THE CALL CENTER AS SEEN ON THIS PAGE FOR FOLLOWUP

## 2020-11-01 NOTE — ED PROVIDER NOTE - PATIENT PORTAL LINK FT
You can access the FollowMyHealth Patient Portal offered by [facility] by registering at the following website: [email or web address]. By joining [employer]’s FollowMyHealth portal, you will also be able to view your health information using other applications (apps) compatible with our system.

## 2020-12-30 ENCOUNTER — EMERGENCY (EMERGENCY)
Facility: HOSPITAL | Age: 45
LOS: 1 days | Discharge: HOME | End: 2020-12-30
Admitting: EMERGENCY MEDICINE
Payer: COMMERCIAL

## 2020-12-30 VITALS
DIASTOLIC BLOOD PRESSURE: 81 MMHG | SYSTOLIC BLOOD PRESSURE: 142 MMHG | RESPIRATION RATE: 18 BRPM | OXYGEN SATURATION: 97 % | TEMPERATURE: 98 F | HEIGHT: 69 IN | WEIGHT: 139.99 LBS | HEART RATE: 93 BPM

## 2020-12-30 DIAGNOSIS — R11.10 VOMITING, UNSPECIFIED: ICD-10-CM

## 2020-12-30 DIAGNOSIS — F10.239 ALCOHOL DEPENDENCE WITH WITHDRAWAL, UNSPECIFIED: ICD-10-CM

## 2020-12-30 LAB
ALBUMIN SERPL ELPH-MCNC: 3.9 G/DL — SIGNIFICANT CHANGE UP (ref 3.4–5)
ALP SERPL-CCNC: 50 U/L — SIGNIFICANT CHANGE UP (ref 40–120)
ALT FLD-CCNC: 110 U/L — HIGH (ref 12–42)
ANION GAP SERPL CALC-SCNC: 19 MMOL/L — HIGH (ref 9–16)
AST SERPL-CCNC: 200 U/L — HIGH (ref 15–37)
BASOPHILS # BLD AUTO: 0.05 K/UL — SIGNIFICANT CHANGE UP (ref 0–0.2)
BASOPHILS NFR BLD AUTO: 1.3 % — SIGNIFICANT CHANGE UP (ref 0–2)
BILIRUB SERPL-MCNC: 0.4 MG/DL — SIGNIFICANT CHANGE UP (ref 0.2–1.2)
BUN SERPL-MCNC: 10 MG/DL — SIGNIFICANT CHANGE UP (ref 7–23)
CALCIUM SERPL-MCNC: 8.6 MG/DL — SIGNIFICANT CHANGE UP (ref 8.5–10.5)
CHLORIDE SERPL-SCNC: 103 MMOL/L — SIGNIFICANT CHANGE UP (ref 96–108)
CO2 SERPL-SCNC: 20 MMOL/L — LOW (ref 22–31)
CREAT SERPL-MCNC: 0.79 MG/DL — SIGNIFICANT CHANGE UP (ref 0.5–1.3)
EOSINOPHIL # BLD AUTO: 0.01 K/UL — SIGNIFICANT CHANGE UP (ref 0–0.5)
EOSINOPHIL NFR BLD AUTO: 0.3 % — SIGNIFICANT CHANGE UP (ref 0–6)
ETHANOL SERPL-MCNC: 377 MG/DL — HIGH
GLUCOSE SERPL-MCNC: 86 MG/DL — SIGNIFICANT CHANGE UP (ref 70–99)
HCT VFR BLD CALC: 35.6 % — SIGNIFICANT CHANGE UP (ref 34.5–45)
HGB BLD-MCNC: 11.1 G/DL — LOW (ref 11.5–15.5)
IMM GRANULOCYTES NFR BLD AUTO: 0.3 % — SIGNIFICANT CHANGE UP (ref 0–1.5)
LIDOCAIN IGE QN: 494 U/L — HIGH (ref 73–393)
LYMPHOCYTES # BLD AUTO: 0.94 K/UL — LOW (ref 1–3.3)
LYMPHOCYTES # BLD AUTO: 24.9 % — SIGNIFICANT CHANGE UP (ref 13–44)
MAGNESIUM SERPL-MCNC: 2.1 MG/DL — SIGNIFICANT CHANGE UP (ref 1.6–2.6)
MCHC RBC-ENTMCNC: 29.1 PG — SIGNIFICANT CHANGE UP (ref 27–34)
MCHC RBC-ENTMCNC: 31.2 GM/DL — LOW (ref 32–36)
MCV RBC AUTO: 93.2 FL — SIGNIFICANT CHANGE UP (ref 80–100)
MONOCYTES # BLD AUTO: 0.41 K/UL — SIGNIFICANT CHANGE UP (ref 0–0.9)
MONOCYTES NFR BLD AUTO: 10.9 % — SIGNIFICANT CHANGE UP (ref 2–14)
NEUTROPHILS # BLD AUTO: 2.35 K/UL — SIGNIFICANT CHANGE UP (ref 1.8–7.4)
NEUTROPHILS NFR BLD AUTO: 62.3 % — SIGNIFICANT CHANGE UP (ref 43–77)
NRBC # BLD: 0 /100 WBCS — SIGNIFICANT CHANGE UP (ref 0–0)
PLATELET # BLD AUTO: 171 K/UL — SIGNIFICANT CHANGE UP (ref 150–400)
POTASSIUM SERPL-MCNC: 5 MMOL/L — SIGNIFICANT CHANGE UP (ref 3.5–5.3)
POTASSIUM SERPL-SCNC: 5 MMOL/L — SIGNIFICANT CHANGE UP (ref 3.5–5.3)
PROT SERPL-MCNC: 7.8 G/DL — SIGNIFICANT CHANGE UP (ref 6.4–8.2)
RBC # BLD: 3.82 M/UL — SIGNIFICANT CHANGE UP (ref 3.8–5.2)
RBC # FLD: 16.8 % — HIGH (ref 10.3–14.5)
SODIUM SERPL-SCNC: 142 MMOL/L — SIGNIFICANT CHANGE UP (ref 132–145)
WBC # BLD: 3.77 K/UL — LOW (ref 3.8–10.5)
WBC # FLD AUTO: 3.77 K/UL — LOW (ref 3.8–10.5)

## 2020-12-30 PROCEDURE — 99284 EMERGENCY DEPT VISIT MOD MDM: CPT

## 2020-12-30 NOTE — ED ADULT NURSE NOTE - NSIMPLEMENTINTERV_GEN_ALL_ED
Implemented All Universal Safety Interventions:  [place] to call system. Call bell, personal items and telephone within reach. Instruct patient to call for assistance. Room bathroom lighting operational. Non-slip footwear when patient is off stretcher. Physically safe environment: no spills, clutter or unnecessary equipment. Stretcher in lowest position, wheels locked, appropriate side rails in place.

## 2020-12-30 NOTE — ED ADULT NURSE NOTE - OBJECTIVE STATEMENT
Pt w/ PMH of ETOH abuse presents c/o "alcohol withdrawal."  Pt is A&Ox3, denies hallucinations, tremors, HA or dizziness.  Pt pending labs.

## 2020-12-30 NOTE — ED ADULT TRIAGE NOTE - CHIEF COMPLAINT QUOTE
pt walked into er appears intoxicated states drinks alcohol daily and thinks she is withdrawing, last drank 2 hr ago, pt repeating herself and slurring speech, no hx of seizures, pmhx pernicious anaemia, pt states sleeping a lot and loss of appeite , pt calm and appropriate with questions

## 2020-12-31 VITALS
OXYGEN SATURATION: 95 % | TEMPERATURE: 97 F | HEART RATE: 82 BPM | RESPIRATION RATE: 18 BRPM | DIASTOLIC BLOOD PRESSURE: 84 MMHG | SYSTOLIC BLOOD PRESSURE: 139 MMHG

## 2020-12-31 NOTE — ED PROVIDER NOTE - NSFOLLOWUPINSTRUCTIONS_ED_ALL_ED_FT
PLEASE FOLLOW-UP WITH YOUR PRIMARY CARE DOCTOR IN 1-2 DAYS.  BRING ALL PAPERWORK FROM TODAY'S VISIT TO YOUR FOLLOW-UP VISIT.  IF YOU DO NOT HAVE A PRIMARY CARE DOCTOR PLEASE REFER TO THE OFFICE/CLINIC INFORMATION GIVEN ABOVE.  YOU MAY ALSO CALL [phone] AND ASK FOR MS. TIMA CARCAMO.  SHE CAN HELP YOU MAKE A FOLLOW-UP APPOINTMENT AND HELP YOU WITH DETOX SERVICES.  HER HOURS ARE 11AM-7PM MONDAY - FRIDAY. ALTERNATIVELY YOU CAN CALL THE CALL CENTER AS SEEN ON THIS PAGE FOR FOLLOWUP    -TAKE OVER THE COUNTER IBUPROFEN 600MG BY MOUTH EVERY 8 HOURS AS NEEDED FOR PAIN.  BE  -PLEASE RETURN TO THE ER IMMEDIATELY OR CALL 911 FOR ANY HIGH FEVER, TROUBLE BREATHING, ABDOMINAL PAIN, HEADACHE, VOMITING, SEVERE PAIN, OR ANY OTHER CONCERNS     Drink plenty of fluids  Strongly consider detox as continuing to drink alcohol is dangerous to your health

## 2020-12-31 NOTE — ED PROVIDER NOTE - PHYSICAL EXAMINATION
CONSTITUTIONAL: Well-appearing; well-nourished; in no apparent distress.   	HEAD: Normocephalic; atraumatic.   	EYES: clear  	ENT: normal nose; no rhinorrhea; normal pharynx with no erythema or lesions.   	NECK: Supple; non-tender;   	CARDIOVASCULAR: Normal S1, S2; no murmurs, rubs, or gallops. Regular rate and rhythm.   	RESPIRATORY: Breathing easily; breath sounds clear and equal bilaterally; no wheezes, rhonchi, or rales.  	GI: Soft; non-distended; non-tender. no cvat bilaterally  	EXT: No cyanosis or edema; N/V intact  	SKIN: Normal for age and race; warm; dry; good turgor; no apparent lesions or rash.   	NEURO: A & O x 3; face symmetric; grossly unremarkable.   PSYCHOLOGICAL: The patient’s mood and manner are appropriate.

## 2020-12-31 NOTE — ED PROVIDER NOTE - PATIENT PORTAL LINK FT
You can access the FollowMyHealth Patient Portal offered by [facility] by registering at the following website: [email or web address]. By joining [employer]’s FollowMyHealth portal, you will also be able to view your health information using other applications (apps) compatible with our system.

## 2020-12-31 NOTE — ED PROVIDER NOTE - CLINICAL SUMMARY MEDICAL DECISION MAKING FREE TEXT BOX
hx alcohol abuse arrives with , well appearing, NAD, tolerating po. transaminitis most likely from alcohol use. vomiting yesterday, none today, toelrating Po. abdomen soft nontender nondistended. advised to go to detox, f/u primary care, will provide SW resources.

## 2020-12-31 NOTE — ED PROVIDER NOTE - CARE PROVIDER_API CALL
Brandon Montaño (DO)  [address], [facility] [place] [address]  Phone: [phone]  Fax: [phone]  Follow Up Time:

## 2020-12-31 NOTE — ED PROVIDER NOTE - OBJECTIVE STATEMENT
44 yo female with hx anxiety, depression, alcohol abuse here requesting for electrolytes to be checked as she states she had an episode of vomiting yesterday, none today. feeling "tired". last drink few hours ago. denies abdominal pain, headache, chest pain, dyspnea, fever/chills, diarrhea.

## 2020-12-31 NOTE — ED PROVIDER NOTE - CARE PROVIDERS DIRECT ADDRESSES
[email or web address]

## 2021-01-04 ENCOUNTER — APPOINTMENT (OUTPATIENT)
Dept: HEART AND VASCULAR | Facility: CLINIC | Age: 46
End: 2021-01-04

## 2021-01-11 ENCOUNTER — APPOINTMENT (OUTPATIENT)
Dept: HEART AND VASCULAR | Facility: CLINIC | Age: 46
End: 2021-01-11

## 2021-01-19 ENCOUNTER — NON-APPOINTMENT (OUTPATIENT)
Age: 46
End: 2021-01-19

## 2021-01-19 ENCOUNTER — APPOINTMENT (OUTPATIENT)
Dept: HEART AND VASCULAR | Facility: CLINIC | Age: 46
End: 2021-01-19
Payer: COMMERCIAL

## 2021-01-19 VITALS
DIASTOLIC BLOOD PRESSURE: 81 MMHG | BODY MASS INDEX: 20.29 KG/M2 | OXYGEN SATURATION: 95 % | SYSTOLIC BLOOD PRESSURE: 126 MMHG | HEIGHT: 69 IN | WEIGHT: 137 LBS | HEART RATE: 89 BPM | TEMPERATURE: 98.2 F

## 2021-01-19 PROCEDURE — 93000 ELECTROCARDIOGRAM COMPLETE: CPT

## 2021-01-19 PROCEDURE — 99072 ADDL SUPL MATRL&STAF TM PHE: CPT

## 2021-01-19 PROCEDURE — 99214 OFFICE O/P EST MOD 30 MIN: CPT

## 2021-01-19 NOTE — REASON FOR VISIT
[Follow-Up - Clinic] : a clinic follow-up of [Dyspnea] : dyspnea [FreeTextEntry1] : Patient last seen in August. She rescheduled had echo and follow up multiple times. She had a few visiits to ER secondary to psych and substance abuse issues. She is seeing GI tomorrow in [facility]. She does not have a primary care doctor. She arrives for this follow up 90 min late.

## 2021-01-19 NOTE — PHYSICAL EXAM
[General Appearance - Well Developed] : well developed [Normal Appearance] : normal appearance [Well Groomed] : well groomed [General Appearance - Well Nourished] : well nourished [No Deformities] : no deformities [General Appearance - In No Acute Distress] : no acute distress [Normal Conjunctiva] : the conjunctiva exhibited no abnormalities [Eyelids - No Xanthelasma] : the eyelids demonstrated no xanthelasmas [Normal Oral Mucosa] : normal oral mucosa [No Oral Pallor] : no oral pallor [No Oral Cyanosis] : no oral cyanosis [Normal Jugular Venous A Waves Present] : normal jugular venous A waves present [Normal Jugular Venous V Waves Present] : normal jugular venous V waves present [No Jugular Venous Campos A Waves] : no jugular venous campos A waves [Respiration, Rhythm And Depth] : normal respiratory rhythm and effort [Exaggerated Use Of Accessory Muscles For Inspiration] : no accessory muscle use [Auscultation Breath Sounds / Voice Sounds] : lungs were clear to auscultation bilaterally [Heart Rate And Rhythm] : heart rate and rhythm were normal [Heart Sounds] : normal S1 and S2 [FreeTextEntry1] : BA 2/6 [Abdomen Soft] : soft [Abdomen Tenderness] : non-tender [Abdomen Mass (___ Cm)] : no abdominal mass palpated [Abnormal Walk] : normal gait [Gait - Sufficient For Exercise Testing] : the gait was sufficient for exercise testing [Nail Clubbing] : no clubbing of the fingernails [Cyanosis, Localized] : no localized cyanosis [Petechial Hemorrhages (___cm)] : no petechial hemorrhages [Skin Color & Pigmentation] : normal skin color and pigmentation [] : no rash [No Venous Stasis] : no venous stasis [Skin Lesions] : no skin lesions [No Skin Ulcers] : no skin ulcer [No Xanthoma] : no  xanthoma was observed [Oriented To Time, Place, And Person] : oriented to person, place, and time [Affect] : the affect was normal [Mood] : the mood was normal [No Anxiety] : not feeling anxious

## 2021-01-19 NOTE — DISCUSSION/SUMMARY
[FreeTextEntry1] : CP/SOB/palpitations follow up with GI as planned. will provide information for primary care. Will not schedule additional testing until she demonstrates ability to maintain regular follow up. EKG reviewed. Emotional support provided.\par

## 2021-02-07 ENCOUNTER — EMERGENCY (EMERGENCY)
Facility: HOSPITAL | Age: 46
LOS: 1 days | Discharge: HOME | End: 2021-02-07
Admitting: EMERGENCY MEDICINE
Payer: COMMERCIAL

## 2021-02-07 VITALS
SYSTOLIC BLOOD PRESSURE: 115 MMHG | OXYGEN SATURATION: 95 % | HEART RATE: 89 BPM | RESPIRATION RATE: 18 BRPM | TEMPERATURE: 98 F | WEIGHT: 164.91 LBS | DIASTOLIC BLOOD PRESSURE: 76 MMHG | HEIGHT: 69 IN

## 2021-02-07 DIAGNOSIS — R53.1 WEAKNESS: ICD-10-CM

## 2021-02-07 DIAGNOSIS — F10.20 ALCOHOL DEPENDENCE, UNCOMPLICATED: ICD-10-CM

## 2021-02-07 DIAGNOSIS — R74.01 ELEVATION OF LEVELS OF LIVER TRANSAMINASE LEVELS: ICD-10-CM

## 2021-02-07 LAB
ALBUMIN SERPL ELPH-MCNC: 4 G/DL — SIGNIFICANT CHANGE UP (ref 3.4–5)
ALP SERPL-CCNC: 55 U/L — SIGNIFICANT CHANGE UP (ref 40–120)
ALT FLD-CCNC: 128 U/L — HIGH (ref 12–42)
ANION GAP SERPL CALC-SCNC: 19 MMOL/L — HIGH (ref 9–16)
AST SERPL-CCNC: 219 U/L — HIGH (ref 15–37)
BASOPHILS # BLD AUTO: 0.04 K/UL — SIGNIFICANT CHANGE UP (ref 0–0.2)
BASOPHILS NFR BLD AUTO: 1.2 % — SIGNIFICANT CHANGE UP (ref 0–2)
BILIRUB SERPL-MCNC: 0.7 MG/DL — SIGNIFICANT CHANGE UP (ref 0.2–1.2)
BUN SERPL-MCNC: 11 MG/DL — SIGNIFICANT CHANGE UP (ref 7–23)
CALCIUM SERPL-MCNC: 8.6 MG/DL — SIGNIFICANT CHANGE UP (ref 8.5–10.5)
CHLORIDE SERPL-SCNC: 97 MMOL/L — SIGNIFICANT CHANGE UP (ref 96–108)
CO2 SERPL-SCNC: 22 MMOL/L — SIGNIFICANT CHANGE UP (ref 22–31)
CREAT SERPL-MCNC: 0.51 MG/DL — SIGNIFICANT CHANGE UP (ref 0.5–1.3)
EOSINOPHIL # BLD AUTO: 0.01 K/UL — SIGNIFICANT CHANGE UP (ref 0–0.5)
EOSINOPHIL NFR BLD AUTO: 0.3 % — SIGNIFICANT CHANGE UP (ref 0–6)
ETHANOL SERPL-MCNC: 431 MG/DL — HIGH
GLUCOSE SERPL-MCNC: 90 MG/DL — SIGNIFICANT CHANGE UP (ref 70–99)
HCT VFR BLD CALC: 32.5 % — LOW (ref 34.5–45)
HGB BLD-MCNC: 10.2 G/DL — LOW (ref 11.5–15.5)
IMM GRANULOCYTES NFR BLD AUTO: 0.6 % — SIGNIFICANT CHANGE UP (ref 0–1.5)
LYMPHOCYTES # BLD AUTO: 0.54 K/UL — LOW (ref 1–3.3)
LYMPHOCYTES # BLD AUTO: 16.1 % — SIGNIFICANT CHANGE UP (ref 13–44)
MAGNESIUM SERPL-MCNC: 2.2 MG/DL — SIGNIFICANT CHANGE UP (ref 1.6–2.6)
MCHC RBC-ENTMCNC: 28.4 PG — SIGNIFICANT CHANGE UP (ref 27–34)
MCHC RBC-ENTMCNC: 31.4 GM/DL — LOW (ref 32–36)
MCV RBC AUTO: 90.5 FL — SIGNIFICANT CHANGE UP (ref 80–100)
MONOCYTES # BLD AUTO: 0.23 K/UL — SIGNIFICANT CHANGE UP (ref 0–0.9)
MONOCYTES NFR BLD AUTO: 6.9 % — SIGNIFICANT CHANGE UP (ref 2–14)
NEUTROPHILS # BLD AUTO: 2.51 K/UL — SIGNIFICANT CHANGE UP (ref 1.8–7.4)
NEUTROPHILS NFR BLD AUTO: 74.9 % — SIGNIFICANT CHANGE UP (ref 43–77)
NRBC # BLD: 0 /100 WBCS — SIGNIFICANT CHANGE UP (ref 0–0)
PLATELET # BLD AUTO: 170 K/UL — SIGNIFICANT CHANGE UP (ref 150–400)
POTASSIUM SERPL-MCNC: 3.9 MMOL/L — SIGNIFICANT CHANGE UP (ref 3.5–5.3)
POTASSIUM SERPL-SCNC: 3.9 MMOL/L — SIGNIFICANT CHANGE UP (ref 3.5–5.3)
PROT SERPL-MCNC: 8.1 G/DL — SIGNIFICANT CHANGE UP (ref 6.4–8.2)
RBC # BLD: 3.59 M/UL — LOW (ref 3.8–5.2)
RBC # FLD: 17.3 % — HIGH (ref 10.3–14.5)
SODIUM SERPL-SCNC: 138 MMOL/L — SIGNIFICANT CHANGE UP (ref 132–145)
WBC # BLD: 3.35 K/UL — LOW (ref 3.8–10.5)
WBC # FLD AUTO: 3.35 K/UL — LOW (ref 3.8–10.5)

## 2021-02-07 PROCEDURE — 99284 EMERGENCY DEPT VISIT MOD MDM: CPT

## 2021-02-07 RX ORDER — SODIUM CHLORIDE 9 MG/ML
1000 INJECTION INTRAMUSCULAR; INTRAVENOUS; SUBCUTANEOUS ONCE
Refills: 0 | Status: DISCONTINUED | OUTPATIENT
Start: 2021-02-07 | End: 2021-02-07

## 2021-02-07 RX ORDER — THIAMINE MONONITRATE (VIT B1) 100 MG
100 TABLET ORAL ONCE
Refills: 0 | Status: COMPLETED | OUTPATIENT
Start: 2021-02-07 | End: 2021-02-07

## 2021-02-07 RX ORDER — FOLIC ACID 0.8 MG
1 TABLET ORAL ONCE
Refills: 0 | Status: COMPLETED | OUTPATIENT
Start: 2021-02-07 | End: 2021-02-07

## 2021-02-07 RX ADMIN — Medication 1 MILLIGRAM(S): at 22:41

## 2021-02-07 RX ADMIN — Medication 100 MILLIGRAM(S): at 22:42

## 2021-02-07 NOTE — ED PROVIDER NOTE - PHYSICAL EXAMINATION
Vital Signs - nursing notes reviewed and confirmed  Gen - WDWN F, NAD, comfortable and non-toxic appearing, speaking in full sentences   Skin - warm, dry, intact  HEENT - AT/NC, PERRL, EOMI, no conjunctival injection, moist oral mucosa, TM intact b/l with good cone of lights, o/p clear with no erythema, edema, or exudate, uvula midline, airway patent, neck supple and NT, FROM, no JVD or carotid bruits b/l, no palpable nodes  CV - S1S2, R/R/R  Resp - respiration non-labored, CTAB, symmetric bs b/l, no r/r/w  GI - NABS, soft, ND, NT, no rebound or guarding, no CVAT b/l   MS - w/w/p, no c/c/e, calves supple and NT, distal pulses symmetric b/l, brisk cap refills, +SILT  Neuro - AxOx3, no focal neuro deficits, CN II-XII grossly intact, cerebellar function intact, negative pronator drift, negative nystagmus, ambulatory without gait disturbance, negative asterixes

## 2021-02-07 NOTE — ED ADULT NURSE NOTE - NSIMPLEMENTINTERV_GEN_ALL_ED
Implemented All Fall with Harm Risk Interventions:  [place] to call system. Call bell, personal items and telephone within reach. Instruct patient to call for assistance. Room bathroom lighting operational. Non-slip footwear when patient is off stretcher. Physically safe environment: no spills, clutter or unnecessary equipment. Stretcher in lowest position, wheels locked, appropriate side rails in place. Provide visual cue, wrist band, yellow gown, etc. Monitor gait and stability. Monitor for mental status changes and reorient to person, place, and time. Review medications for side effects contributing to fall risk. Reinforce activity limits and safety measures with patient and family. Provide visual clues: red socks.

## 2021-02-07 NOTE — ED ADULT TRIAGE NOTE - CHIEF COMPLAINT QUOTE
pt complains of 'alcohol withdrawal.' Reports weakness and loss of appetite. Last alcoholic drink 2-3 hours ago. No tremors noted, mild unsteady gait however ambulatory on arrival.

## 2021-02-07 NOTE — ED PROVIDER NOTE - OBJECTIVE STATEMENT
45 yo F with PMHx of chronic alcoholism, anxiety, depression, presenting c/o generalized weakness 47 yo F with PMHx of chronic alcoholism, anxiety, depression, presenting c/o generalized weakness and loss of appetite for a few days.  · Pt states that she has been drinking 1/4 bottle of whisky every day for 2-3 yrs, trying to get into rehab and sorting out some family issues lately, and now with decreased appetite but increased alcohol consumption. Denies HA, dizziness, numbness, tingling, photophobia, diplopia, change in vision/hearing/gait/mental status/speech, AH/VH/delusion, focal weakness, neck pain, rash, fever, chills, stiffness, CP, SOB, palpitations, diaphoresis, N/V/D/C, abdominal pain, change in urinary/bowel function, dysuria, hematuria, flank pain, and malaise.

## 2021-02-07 NOTE — ED PROVIDER NOTE - CLINICAL SUMMARY MEDICAL DECISION MAKING FREE TEXT BOX
pt is a known chronic alcoholic, has been having increased alcohol consumption lately, no s/s of withdrawal on exam, CIWA 0, labs consistent with baseline and alcohol transaminitis, counseling and outpt detox information provided, AFVSS at time of d/c, pt non-toxic appearing, results, ddx, and f/u plans discussed with pt at bedside, d/c'd home to f/u with PMD, strict return precautions discussed, prompt return to ER for any worsening or new sx, pt verbalized understanding.

## 2021-02-07 NOTE — ED ADULT NURSE NOTE - OBJECTIVE STATEMENT
has been drinking every day 1/4 bottle of whisky for 2 years, trying to get into rehab, noticed appetite decreased and alcohol increased recently, no hx of seizures, last drank 2 hrs ago, ciwa 0 at present, gcs 15, slurred speech but answering all questions appropriately, calm and compliant, states  knows she is here, side rails x2 up on bed

## 2021-02-07 NOTE — ED PROVIDER NOTE - CARE PLAN
Principal Discharge DX:	Alcoholism  Secondary Diagnosis:	General medical examination  Secondary Diagnosis:	Transaminitis

## 2021-03-23 ENCOUNTER — APPOINTMENT (OUTPATIENT)
Dept: HEART AND VASCULAR | Facility: CLINIC | Age: 46
End: 2021-03-23

## 2021-06-29 VITALS
HEART RATE: 118 BPM | DIASTOLIC BLOOD PRESSURE: 80 MMHG | RESPIRATION RATE: 16 BRPM | HEIGHT: 69 IN | TEMPERATURE: 98 F | WEIGHT: 132.94 LBS | OXYGEN SATURATION: 96 % | SYSTOLIC BLOOD PRESSURE: 123 MMHG

## 2021-06-29 LAB
ALBUMIN SERPL ELPH-MCNC: 4.2 G/DL — SIGNIFICANT CHANGE UP (ref 3.4–5)
ALP SERPL-CCNC: 55 U/L — SIGNIFICANT CHANGE UP (ref 40–120)
ALT FLD-CCNC: 46 U/L — HIGH (ref 12–42)
ANION GAP SERPL CALC-SCNC: 35 MMOL/L — HIGH (ref 9–16)
APTT BLD: 25.4 SEC — LOW (ref 27.5–35.5)
AST SERPL-CCNC: 83 U/L — HIGH (ref 15–37)
BILIRUB DIRECT SERPL-MCNC: 0.5 MG/DL — HIGH
BILIRUB INDIRECT FLD-MCNC: 0.8 MG/DL — SIGNIFICANT CHANGE UP (ref 0.2–1)
BILIRUB SERPL-MCNC: 1.3 MG/DL — HIGH (ref 0.2–1.2)
BUN SERPL-MCNC: 14 MG/DL — SIGNIFICANT CHANGE UP (ref 7–23)
CALCIUM SERPL-MCNC: 8.3 MG/DL — LOW (ref 8.5–10.5)
CHLORIDE SERPL-SCNC: 98 MMOL/L — SIGNIFICANT CHANGE UP (ref 96–108)
CO2 SERPL-SCNC: 9 MMOL/L — CRITICAL LOW (ref 22–31)
CREAT SERPL-MCNC: 1.23 MG/DL — SIGNIFICANT CHANGE UP (ref 0.5–1.3)
ETHANOL SERPL-MCNC: 41 MG/DL — HIGH
GLUCOSE SERPL-MCNC: 121 MG/DL — HIGH (ref 70–99)
HCT VFR BLD CALC: 34.6 % — SIGNIFICANT CHANGE UP (ref 34.5–45)
HGB BLD-MCNC: 10.9 G/DL — LOW (ref 11.5–15.5)
INR BLD: 1.11 — SIGNIFICANT CHANGE UP (ref 0.88–1.16)
LACTATE SERPL-SCNC: 5.8 MMOL/L — CRITICAL HIGH (ref 0.4–2)
LIDOCAIN IGE QN: >1500 U/L — HIGH (ref 73–393)
MAGNESIUM SERPL-MCNC: 1.4 MG/DL — LOW (ref 1.6–2.6)
MCHC RBC-ENTMCNC: 31.5 GM/DL — LOW (ref 32–36)
MCHC RBC-ENTMCNC: 32.2 PG — SIGNIFICANT CHANGE UP (ref 27–34)
MCV RBC AUTO: 102.4 FL — HIGH (ref 80–100)
NRBC # BLD: 0 /100 WBCS — SIGNIFICANT CHANGE UP (ref 0–0)
PLATELET # BLD AUTO: 178 K/UL — SIGNIFICANT CHANGE UP (ref 150–400)
POTASSIUM SERPL-MCNC: 2.9 MMOL/L — CRITICAL LOW (ref 3.5–5.3)
POTASSIUM SERPL-SCNC: 2.9 MMOL/L — CRITICAL LOW (ref 3.5–5.3)
PROT SERPL-MCNC: 8 G/DL — SIGNIFICANT CHANGE UP (ref 6.4–8.2)
PROTHROM AB SERPL-ACNC: 13 SEC — SIGNIFICANT CHANGE UP (ref 10.6–13.6)
RBC # BLD: 3.38 M/UL — LOW (ref 3.8–5.2)
RBC # FLD: 18.6 % — HIGH (ref 10.3–14.5)
SARS-COV-2 RNA SPEC QL NAA+PROBE: SIGNIFICANT CHANGE UP
SODIUM SERPL-SCNC: 142 MMOL/L — SIGNIFICANT CHANGE UP (ref 132–145)
TROPONIN I SERPL-MCNC: <0.017 NG/ML — LOW (ref 0.02–0.06)
WBC # BLD: 10.35 K/UL — SIGNIFICANT CHANGE UP (ref 3.8–10.5)
WBC # FLD AUTO: 10.35 K/UL — SIGNIFICANT CHANGE UP (ref 3.8–10.5)

## 2021-06-29 PROCEDURE — 71045 X-RAY EXAM CHEST 1 VIEW: CPT | Mod: 26

## 2021-06-29 PROCEDURE — 99291 CRITICAL CARE FIRST HOUR: CPT

## 2021-06-29 PROCEDURE — 93010 ELECTROCARDIOGRAM REPORT: CPT

## 2021-06-29 PROCEDURE — 99292 CRITICAL CARE ADDL 30 MIN: CPT

## 2021-06-29 RX ORDER — POTASSIUM CHLORIDE 20 MEQ
40 PACKET (EA) ORAL ONCE
Refills: 0 | Status: COMPLETED | OUTPATIENT
Start: 2021-06-29 | End: 2021-06-29

## 2021-06-29 RX ORDER — MAGNESIUM SULFATE 500 MG/ML
2 VIAL (ML) INJECTION ONCE
Refills: 0 | Status: COMPLETED | OUTPATIENT
Start: 2021-06-29 | End: 2021-06-29

## 2021-06-29 RX ORDER — ACETAMINOPHEN 500 MG
650 TABLET ORAL ONCE
Refills: 0 | Status: COMPLETED | OUTPATIENT
Start: 2021-06-29 | End: 2021-06-29

## 2021-06-29 RX ORDER — PANTOPRAZOLE SODIUM 20 MG/1
80 TABLET, DELAYED RELEASE ORAL ONCE
Refills: 0 | Status: COMPLETED | OUTPATIENT
Start: 2021-06-29 | End: 2021-06-29

## 2021-06-29 RX ORDER — CEFTRIAXONE 500 MG/1
1000 INJECTION, POWDER, FOR SOLUTION INTRAMUSCULAR; INTRAVENOUS ONCE
Refills: 0 | Status: COMPLETED | OUTPATIENT
Start: 2021-06-29 | End: 2021-06-29

## 2021-06-29 RX ORDER — POTASSIUM CHLORIDE 20 MEQ
10 PACKET (EA) ORAL
Refills: 0 | Status: COMPLETED | OUTPATIENT
Start: 2021-06-29 | End: 2021-06-29

## 2021-06-29 RX ORDER — SODIUM CHLORIDE 9 MG/ML
1000 INJECTION, SOLUTION INTRAVENOUS
Refills: 0 | Status: COMPLETED | OUTPATIENT
Start: 2021-06-29 | End: 2021-06-29

## 2021-06-29 RX ADMIN — SODIUM CHLORIDE 2000 MILLILITER(S): 9 INJECTION, SOLUTION INTRAVENOUS at 21:37

## 2021-06-29 RX ADMIN — Medication 50 GRAM(S): at 22:31

## 2021-06-29 RX ADMIN — Medication 2 MILLIGRAM(S): at 22:37

## 2021-06-29 RX ADMIN — PANTOPRAZOLE SODIUM 80 MILLIGRAM(S): 20 TABLET, DELAYED RELEASE ORAL at 22:34

## 2021-06-29 RX ADMIN — Medication 100 MILLIEQUIVALENT(S): at 22:32

## 2021-06-29 RX ADMIN — CEFTRIAXONE 100 MILLIGRAM(S): 500 INJECTION, POWDER, FOR SOLUTION INTRAMUSCULAR; INTRAVENOUS at 22:24

## 2021-06-29 RX ADMIN — Medication 100 MILLIEQUIVALENT(S): at 22:25

## 2021-06-29 RX ADMIN — Medication 650 MILLIGRAM(S): at 22:24

## 2021-06-29 RX ADMIN — Medication 100 MILLIEQUIVALENT(S): at 23:34

## 2021-06-29 RX ADMIN — Medication 650 MILLIGRAM(S): at 23:04

## 2021-06-29 NOTE — ED PROVIDER NOTE - PMH
Alcohol abuse    Anxiety    Depression     Alcohol abuse    Anxiety    Depression    Iron deficiency anemia, unspecified iron deficiency anemia type

## 2021-06-29 NOTE — ED PROVIDER NOTE - OBJECTIVE STATEMENT
46 F presenting with n/v every 2 h since Saturday. She has a hx of Severe EtOH abuse (drinks 1/2-1 1 L of Vodka per day)- she has been unable to keep up with PO liquids or foods and is also in EtOH WD. Yesterday she says that she vomited some blood, none today. Not really complaining of abdo pain or diarrhea. She has had some UGI bleeding in the past but had declined endoscopy. + tremulous. She appears to be minimizing quite a bit. Per her  she has been detoxed many times but never went to rehab. She denies hx of EtOH WD related seizures or ICU stays. They seem affluent and he seems to be very frustrated. She denies a hx of pancreatitis but says that she was told her liver was affected.    She is not yet vaccinated for COVID.

## 2021-06-29 NOTE — ED PROVIDER NOTE - CLINICAL SUMMARY MEDICAL DECISION MAKING FREE TEXT BOX
Patient presenting with n/v x 4d and EtOH WD, found to be in metabolic acidosis (SKA and/or AKA most likley) with low grade fever and pancreatitis. Getting IVF (D5NS), Ativan IV, IV K+ and Mag and Zofran. CTAP ordered. For admission to monitored bed at [facility]. Patient agreeable. CXR clear.  left. Patient presenting with n/v x 4d and EtOH WD, found to be in metabolic acidosis (SKA and/or AKA most likley) with low grade fever and pancreatitis (lipase reported as >1500, per lab actual value on automated machine was ~ 5100). . Getting IVF (D5NS the LR), Ativan IV, IV K+ and Mag and Zofran/Reglan. CTAP ordered. For admission to monitored bed at [facility]. Patient agreeable. CXR clear.  left.  Abx board to Zosyn (not Flagyl for now a she still as EtOH on board and is already vomiting.

## 2021-06-29 NOTE — ED PROVIDER NOTE - PHYSICAL EXAMINATION
VITAL SIGNS: I have reviewed nursing notes and confirm.  CONSTITUTIONAL: Thin, looks older than stated age, weak appearing, pale and tremulous  SKIN: Skin is warm and dry, no acute rash. + palor  HEAD: Normocephalic; atraumatic.  EYES: Pupils round bilaterally, 3mm; conjunctiva and sclera clear.  ENT: No nasal discharge; airway clear, MM very dry.  NECK: Supple; non tender.  CARD: S1, S2 normal; no murmurs, gallops, or rubs. Mild tachycardia.  RESP: No wheezes, rales or rhonchi.  : No CVAT tenderness bilaterally   ABD: Soft; non-distended; non-tender; no rebound or guarding.  EXT: Normal ROM. No clubbing, cyanosis or edema.  NEURO: Alert, oriented. Grossly unremarkable. REESE, normal tone, no gross motor or sensory changes. Fluent speech.   PSYCH: Cooperative, somewhat vague historian, seems avoidant/minimizing. Poor eye contact. Uncomfortable dynamic with spouse.

## 2021-06-29 NOTE — ED ADULT TRIAGE NOTE - HEIGHT IN INCHES
Mom and Dad report that pt. Was well appearing today, no fever prior to seizure tonight. Pt. Had been eating and drinking well without any emesis, cough, congestion, or diarrhea. Pt. Was outside playing and fell down having generalized shaking of extremities. EMS was called and when they arrived pt. Was given 1.56 mg of Ativan IV. Pt. PIV 24# to right hand placed at this time. Pt. Seizure stopped according to EMS and pt. Transported here. On arrival to ER pt. Responsive to pain. On RA SPO2 98%. Pt. Connected to CR monitor with continuous pulse ox. Rectal temperature taken.     BBS clear, abdomen soft and non tender. Pulses strong with brisk cap refill. Pt. Responding to pain. Eyes closed. Pt. With chills but not seizure like activity.    9

## 2021-06-29 NOTE — ED ADULT NURSE NOTE - CHIEF COMPLAINT
The patient is a 46y Female complaining of abdominal pain. pt most likely in etoh withdrawal; pt admits to drinking 1 to 2 pints a day of hard etoh; stopped two days ago; tried to keep down etoh today without success.

## 2021-06-29 NOTE — ED PROVIDER NOTE - IV ALTEPLASE EXCL REL HIDDEN
show Melolabial Interpolation Flap Text: A decision was made to reconstruct the defect utilizing an interpolation axial flap and a staged reconstruction.  A telfa template was made of the defect.  This telfa template was then used to outline the melolabial interpolation flap.  The donor area for the pedicle flap was then injected with anesthesia.  The flap was excised through the skin and subcutaneous tissue down to the layer of the underlying musculature.  The pedicle flap was carefully excised within this deep plane to maintain its blood supply.  The edges of the donor site were undermined.   The donor site was closed in a primary fashion.  The pedicle was then rotated into position and sutured.  Once the tube was sutured into place, adequate blood supply was confirmed with blanching and refill.  The pedicle was then wrapped with xeroform gauze and dressed appropriately with a telfa and gauze bandage to ensure continued blood supply and protect the attached pedicle.

## 2021-06-29 NOTE — ED PROVIDER NOTE - PROGRESS NOTE DETAILS
CTAP order placed for elevated lipase with low grade temp. Awaiting callback from MICU at [facility]. Repeat lactate 1.4, will send repeat CMP and VBG to see how well she is clearing her Anion Gap. Case discussed with Dr. Lopez. will be accepted to SDU vs. MICU depending on repeat labs and CT. WD sx seem controlled for now. Patient still cannot tolerate PO.

## 2021-06-29 NOTE — ED PROVIDER NOTE - CARE PLAN
Principal Discharge DX:	Alcohol-induced acute pancreatitis, unspecified complication status  Secondary Diagnosis:	Alcohol withdrawal syndrome with complication  Secondary Diagnosis:	Metabolic acidosis

## 2021-06-29 NOTE — ED PROVIDER NOTE - NS ED MD DISPO ADMIT [facility] PALLIATIVE CARE
Last office visit/physical:  1/27/17 w/ Dr. Das  Last follow up/disposition: PRN  Appointment scheduled (FP)?  No - PSR to contact pt.  Last refill:  8/2016    BP Readings from Last 3 Encounters:   06/07/17 138/80   04/26/17 128/76   03/15/17 122/78     PSR - Please contact this patient to establish with a new PCP.  One month of medication refills sent to [facility].      
Left voicemail informing patient that 1 month refills were sent to [facility], and instructed him to contact our office to make an appointment to establish with a new Provider, prior to any further refills  
NONE

## 2021-06-29 NOTE — ED PROVIDER NOTE - CRITICAL CARE ATTENDING CONTRIBUTION TO CARE
The patient was seen immediately upon arrival due to a high probability of imminent or life-threatening deterioration secondary to EtOH WD, severe pancreatitis, metabolic acidosis, which required my direct attention, intervention, and personal management at the bedside. I have personally provided critical care time exclusive of time spent on separately billable procedures. Time includes review of laboratory data, radiology results, discussion with consultants, discussion with the patient's family, and monitoring for potential decompensation.

## 2021-06-30 ENCOUNTER — INPATIENT (INPATIENT)
Facility: HOSPITAL | Age: 46
LOS: 1 days | Discharge: LEFT BEFORE DISCHARGE | DRG: 439 | End: 2021-07-02
Attending: STUDENT IN AN ORGANIZED HEALTH CARE EDUCATION/TRAINING PROGRAM | Admitting: INTERNAL MEDICINE
Payer: COMMERCIAL

## 2021-06-30 DIAGNOSIS — E87.2 ACIDOSIS: ICD-10-CM

## 2021-06-30 DIAGNOSIS — R63.8 OTHER SYMPTOMS AND SIGNS CONCERNING FOOD AND FLUID INTAKE: ICD-10-CM

## 2021-06-30 DIAGNOSIS — K85.90 ACUTE PANCREATITIS WITHOUT NECROSIS OR INFECTION, UNSPECIFIED: ICD-10-CM

## 2021-06-30 DIAGNOSIS — R74.01 ELEVATION OF LEVELS OF LIVER TRANSAMINASE LEVELS: ICD-10-CM

## 2021-06-30 DIAGNOSIS — F10.239 ALCOHOL DEPENDENCE WITH WITHDRAWAL, UNSPECIFIED: ICD-10-CM

## 2021-06-30 DIAGNOSIS — K92.0 HEMATEMESIS: ICD-10-CM

## 2021-06-30 DIAGNOSIS — R11.10 VOMITING, UNSPECIFIED: ICD-10-CM

## 2021-06-30 DIAGNOSIS — D64.9 ANEMIA, UNSPECIFIED: ICD-10-CM

## 2021-06-30 LAB
ALBUMIN SERPL ELPH-MCNC: 3.2 G/DL — LOW (ref 3.4–5)
ALP SERPL-CCNC: 43 U/L — SIGNIFICANT CHANGE UP (ref 40–120)
ALT FLD-CCNC: 33 U/L — SIGNIFICANT CHANGE UP (ref 12–42)
AMMONIA BLD-MCNC: 33 UMOL/L — SIGNIFICANT CHANGE UP (ref 11–55)
ANION GAP SERPL CALC-SCNC: 19 MMOL/L — HIGH (ref 5–17)
ANION GAP SERPL CALC-SCNC: 19 MMOL/L — HIGH (ref 5–17)
ANION GAP SERPL CALC-SCNC: 20 MMOL/L — HIGH (ref 5–17)
ANION GAP SERPL CALC-SCNC: 23 MMOL/L — HIGH (ref 9–16)
APAP SERPL-MCNC: <5 UG/ML — LOW (ref 10–30)
APPEARANCE UR: CLEAR — SIGNIFICANT CHANGE UP
AST SERPL-CCNC: 65 U/L — HIGH (ref 15–37)
B-OH-BUTYR SERPL-SCNC: 4.6 MMOL/L — HIGH
BASE EXCESS BLDA CALC-SCNC: -4.5 MMOL/L — LOW (ref -2–3)
BILIRUB SERPL-MCNC: 1.1 MG/DL — SIGNIFICANT CHANGE UP (ref 0.2–1.2)
BILIRUB UR-MCNC: NEGATIVE — SIGNIFICANT CHANGE UP
BLD GP AB SCN SERPL QL: NEGATIVE — SIGNIFICANT CHANGE UP
BLD GP AB SCN SERPL QL: NEGATIVE — SIGNIFICANT CHANGE UP
BUN SERPL-MCNC: 11 MG/DL — SIGNIFICANT CHANGE UP (ref 7–23)
BUN SERPL-MCNC: 3 MG/DL — LOW (ref 7–23)
BUN SERPL-MCNC: 6 MG/DL — LOW (ref 7–23)
BUN SERPL-MCNC: 9 MG/DL — SIGNIFICANT CHANGE UP (ref 7–23)
CALCIUM SERPL-MCNC: 6.7 MG/DL — LOW (ref 8.5–10.5)
CALCIUM SERPL-MCNC: 7.3 MG/DL — LOW (ref 8.4–10.5)
CALCIUM SERPL-MCNC: 7.6 MG/DL — LOW (ref 8.4–10.5)
CALCIUM SERPL-MCNC: 7.7 MG/DL — LOW (ref 8.4–10.5)
CHLORIDE SERPL-SCNC: 100 MMOL/L — SIGNIFICANT CHANGE UP (ref 96–108)
CHLORIDE SERPL-SCNC: 101 MMOL/L — SIGNIFICANT CHANGE UP (ref 96–108)
CHLORIDE SERPL-SCNC: 101 MMOL/L — SIGNIFICANT CHANGE UP (ref 96–108)
CHLORIDE SERPL-SCNC: 95 MMOL/L — LOW (ref 96–108)
CHOLEST SERPL-MCNC: 198 MG/DL — SIGNIFICANT CHANGE UP
CO2 BLDA-SCNC: 20 MMOL/L — SIGNIFICANT CHANGE UP (ref 19–24)
CO2 SERPL-SCNC: 13 MMOL/L — LOW (ref 22–31)
CO2 SERPL-SCNC: 19 MMOL/L — LOW (ref 22–31)
CO2 SERPL-SCNC: 20 MMOL/L — LOW (ref 22–31)
CO2 SERPL-SCNC: 24 MMOL/L — SIGNIFICANT CHANGE UP (ref 22–31)
COLOR SPEC: YELLOW — SIGNIFICANT CHANGE UP
COMMENT - URINE: SIGNIFICANT CHANGE UP
CREAT SERPL-MCNC: 0.48 MG/DL — LOW (ref 0.5–1.3)
CREAT SERPL-MCNC: 0.51 MG/DL — SIGNIFICANT CHANGE UP (ref 0.5–1.3)
CREAT SERPL-MCNC: 0.65 MG/DL — SIGNIFICANT CHANGE UP (ref 0.5–1.3)
CREAT SERPL-MCNC: 0.94 MG/DL — SIGNIFICANT CHANGE UP (ref 0.5–1.3)
DIFF PNL FLD: ABNORMAL
GLUCOSE BLDC GLUCOMTR-MCNC: 104 MG/DL — HIGH (ref 70–99)
GLUCOSE BLDC GLUCOMTR-MCNC: 112 MG/DL — HIGH (ref 70–99)
GLUCOSE BLDC GLUCOMTR-MCNC: 139 MG/DL — HIGH (ref 70–99)
GLUCOSE SERPL-MCNC: 148 MG/DL — HIGH (ref 70–99)
GLUCOSE SERPL-MCNC: 416 MG/DL — HIGH (ref 70–99)
GLUCOSE SERPL-MCNC: 80 MG/DL — SIGNIFICANT CHANGE UP (ref 70–99)
GLUCOSE SERPL-MCNC: 96 MG/DL — SIGNIFICANT CHANGE UP (ref 70–99)
GLUCOSE UR QL: 500
HAV IGM SER-ACNC: SIGNIFICANT CHANGE UP
HBV CORE IGM SER-ACNC: SIGNIFICANT CHANGE UP
HBV SURFACE AG SER-ACNC: SIGNIFICANT CHANGE UP
HCG SERPL-ACNC: 1 MIU/ML — SIGNIFICANT CHANGE UP
HCG UR QL: NEGATIVE — SIGNIFICANT CHANGE UP
HCO3 BLDA-SCNC: 19 MMOL/L — LOW (ref 21–28)
HCT VFR BLD CALC: 29.2 % — LOW (ref 34.5–45)
HCT VFR BLD CALC: 30.1 % — LOW (ref 34.5–45)
HCV AB S/CO SERPL IA: 0.04 S/CO — SIGNIFICANT CHANGE UP
HCV AB SERPL-IMP: SIGNIFICANT CHANGE UP
HDLC SERPL-MCNC: 85 MG/DL — SIGNIFICANT CHANGE UP
HGB BLD-MCNC: 9.3 G/DL — LOW (ref 11.5–15.5)
HGB BLD-MCNC: 9.4 G/DL — LOW (ref 11.5–15.5)
HYALINE CASTS # UR AUTO: SIGNIFICANT CHANGE UP /LPF (ref 0–2)
KETONES UR-MCNC: >=80 MG/DL
LACTATE SERPL-SCNC: 1.4 MMOL/L — SIGNIFICANT CHANGE UP (ref 0.4–2)
LEUKOCYTE ESTERASE UR-ACNC: NEGATIVE — SIGNIFICANT CHANGE UP
LIDOCAIN IGE QN: 1037 U/L — HIGH (ref 7–60)
LIPID PNL WITH DIRECT LDL SERPL: 88 MG/DL — SIGNIFICANT CHANGE UP
MAGNESIUM SERPL-MCNC: 1.5 MG/DL — LOW (ref 1.6–2.6)
MAGNESIUM SERPL-MCNC: 1.8 MG/DL — SIGNIFICANT CHANGE UP (ref 1.6–2.6)
MAGNESIUM SERPL-MCNC: 1.9 MG/DL — SIGNIFICANT CHANGE UP (ref 1.6–2.6)
MAGNESIUM SERPL-MCNC: 2.2 MG/DL — SIGNIFICANT CHANGE UP (ref 1.6–2.6)
MCHC RBC-ENTMCNC: 30.9 GM/DL — LOW (ref 32–36)
MCHC RBC-ENTMCNC: 31.7 PG — SIGNIFICANT CHANGE UP (ref 27–34)
MCHC RBC-ENTMCNC: 32.2 GM/DL — SIGNIFICANT CHANGE UP (ref 32–36)
MCHC RBC-ENTMCNC: 32.2 PG — SIGNIFICANT CHANGE UP (ref 27–34)
MCV RBC AUTO: 100 FL — SIGNIFICANT CHANGE UP (ref 80–100)
MCV RBC AUTO: 102.7 FL — HIGH (ref 80–100)
NITRITE UR-MCNC: NEGATIVE — SIGNIFICANT CHANGE UP
NON HDL CHOLESTEROL: 113 MG/DL — SIGNIFICANT CHANGE UP
NRBC # BLD: 0 /100 WBCS — SIGNIFICANT CHANGE UP (ref 0–0)
NRBC # BLD: 0 /100 WBCS — SIGNIFICANT CHANGE UP (ref 0–0)
PCO2 BLDA: 30 MMHG — LOW (ref 32–35)
PCO2 BLDV: 25 MMHG — LOW (ref 41–51)
PH BLDA: 7.41 — SIGNIFICANT CHANGE UP (ref 7.35–7.45)
PH BLDV: 7.28 — LOW (ref 7.32–7.43)
PH UR: 6 — SIGNIFICANT CHANGE UP (ref 5–8)
PHOSPHATE SERPL-MCNC: 0.4 MG/DL — CRITICAL LOW (ref 2.5–4.5)
PHOSPHATE SERPL-MCNC: 1.2 MG/DL — LOW (ref 2.5–4.5)
PHOSPHATE SERPL-MCNC: 1.9 MG/DL — LOW (ref 2.5–4.5)
PLATELET # BLD AUTO: 132 K/UL — LOW (ref 150–400)
PLATELET # BLD AUTO: 135 K/UL — LOW (ref 150–400)
PO2 BLDA: 89 MMHG — SIGNIFICANT CHANGE UP (ref 83–108)
PO2 BLDV: 53 MMHG — HIGH (ref 35–40)
POTASSIUM SERPL-MCNC: 2.8 MMOL/L — CRITICAL LOW (ref 3.5–5.3)
POTASSIUM SERPL-MCNC: 2.9 MMOL/L — CRITICAL LOW (ref 3.5–5.3)
POTASSIUM SERPL-MCNC: 3 MMOL/L — LOW (ref 3.5–5.3)
POTASSIUM SERPL-MCNC: 3 MMOL/L — LOW (ref 3.5–5.3)
POTASSIUM SERPL-SCNC: 2.8 MMOL/L — CRITICAL LOW (ref 3.5–5.3)
POTASSIUM SERPL-SCNC: 2.9 MMOL/L — CRITICAL LOW (ref 3.5–5.3)
POTASSIUM SERPL-SCNC: 3 MMOL/L — LOW (ref 3.5–5.3)
POTASSIUM SERPL-SCNC: 3 MMOL/L — LOW (ref 3.5–5.3)
PROT SERPL-MCNC: 6.5 G/DL — SIGNIFICANT CHANGE UP (ref 6.4–8.2)
PROT UR-MCNC: 30 MG/DL
RBC # BLD: 2.92 M/UL — LOW (ref 3.8–5.2)
RBC # BLD: 2.93 M/UL — LOW (ref 3.8–5.2)
RBC # FLD: 18.7 % — HIGH (ref 10.3–14.5)
RBC # FLD: 18.7 % — HIGH (ref 10.3–14.5)
RBC CASTS # UR COMP ASSIST: < 5 /HPF — SIGNIFICANT CHANGE UP
RH IG SCN BLD-IMP: POSITIVE — SIGNIFICANT CHANGE UP
RH IG SCN BLD-IMP: POSITIVE — SIGNIFICANT CHANGE UP
SALICYLATES SERPL-MCNC: <0.3 MG/DL — LOW (ref 2.8–20)
SAO2 % BLDA: 98 % — SIGNIFICANT CHANGE UP (ref 94–98)
SAO2 % BLDV: 86 % — SIGNIFICANT CHANGE UP
SODIUM SERPL-SCNC: 137 MMOL/L — SIGNIFICANT CHANGE UP (ref 132–145)
SODIUM SERPL-SCNC: 138 MMOL/L — SIGNIFICANT CHANGE UP (ref 135–145)
SODIUM SERPL-SCNC: 139 MMOL/L — SIGNIFICANT CHANGE UP (ref 135–145)
SODIUM SERPL-SCNC: 140 MMOL/L — SIGNIFICANT CHANGE UP (ref 135–145)
SP GR SPEC: 1.02 — SIGNIFICANT CHANGE UP (ref 1–1.03)
TRIGL SERPL-MCNC: 127 MG/DL — SIGNIFICANT CHANGE UP
UROBILINOGEN FLD QL: 0.2 E.U./DL — SIGNIFICANT CHANGE UP
WBC # BLD: 5.86 K/UL — SIGNIFICANT CHANGE UP (ref 3.8–10.5)
WBC # BLD: 5.97 K/UL — SIGNIFICANT CHANGE UP (ref 3.8–10.5)
WBC # FLD AUTO: 5.86 K/UL — SIGNIFICANT CHANGE UP (ref 3.8–10.5)
WBC # FLD AUTO: 5.97 K/UL — SIGNIFICANT CHANGE UP (ref 3.8–10.5)
WBC UR QL: < 5 /HPF — SIGNIFICANT CHANGE UP

## 2021-06-30 PROCEDURE — 74177 CT ABD & PELVIS W/CONTRAST: CPT | Mod: 26

## 2021-06-30 PROCEDURE — 99233 SBSQ HOSP IP/OBS HIGH 50: CPT | Mod: GC

## 2021-06-30 PROCEDURE — 99223 1ST HOSP IP/OBS HIGH 75: CPT | Mod: GC

## 2021-06-30 PROCEDURE — 99252 IP/OBS CONSLTJ NEW/EST SF 35: CPT

## 2021-06-30 RX ORDER — POTASSIUM CHLORIDE 20 MEQ
40 PACKET (EA) ORAL ONCE
Refills: 0 | Status: COMPLETED | OUTPATIENT
Start: 2021-06-30 | End: 2021-06-30

## 2021-06-30 RX ORDER — PANTOPRAZOLE SODIUM 20 MG/1
40 TABLET, DELAYED RELEASE ORAL EVERY 12 HOURS
Refills: 0 | Status: DISCONTINUED | OUTPATIENT
Start: 2021-06-30 | End: 2021-06-30

## 2021-06-30 RX ORDER — FOLIC ACID 0.8 MG
1 TABLET ORAL ONCE
Refills: 0 | Status: COMPLETED | OUTPATIENT
Start: 2021-06-30 | End: 2021-06-30

## 2021-06-30 RX ORDER — POTASSIUM CHLORIDE 20 MEQ
10 PACKET (EA) ORAL
Refills: 0 | Status: COMPLETED | OUTPATIENT
Start: 2021-06-30 | End: 2021-06-30

## 2021-06-30 RX ORDER — POTASSIUM PHOSPHATE, MONOBASIC POTASSIUM PHOSPHATE, DIBASIC 236; 224 MG/ML; MG/ML
30 INJECTION, SOLUTION INTRAVENOUS ONCE
Refills: 0 | Status: COMPLETED | OUTPATIENT
Start: 2021-06-30 | End: 2021-06-30

## 2021-06-30 RX ORDER — SUCRALFATE 1 G
1 TABLET ORAL
Refills: 0 | Status: DISCONTINUED | OUTPATIENT
Start: 2021-06-30 | End: 2021-07-02

## 2021-06-30 RX ORDER — SUCRALFATE 1 G
1 TABLET ORAL
Refills: 0 | Status: DISCONTINUED | OUTPATIENT
Start: 2021-06-30 | End: 2021-06-30

## 2021-06-30 RX ORDER — ONDANSETRON 8 MG/1
4 TABLET, FILM COATED ORAL ONCE
Refills: 0 | Status: COMPLETED | OUTPATIENT
Start: 2021-06-30 | End: 2021-06-30

## 2021-06-30 RX ORDER — SODIUM CHLORIDE 9 MG/ML
1000 INJECTION, SOLUTION INTRAVENOUS
Refills: 0 | Status: DISCONTINUED | OUTPATIENT
Start: 2021-06-30 | End: 2021-07-01

## 2021-06-30 RX ORDER — POTASSIUM CHLORIDE 20 MEQ
10 PACKET (EA) ORAL
Refills: 0 | Status: COMPLETED | OUTPATIENT
Start: 2021-06-30 | End: 2021-07-01

## 2021-06-30 RX ORDER — THIAMINE MONONITRATE (VIT B1) 100 MG
100 TABLET ORAL ONCE
Refills: 0 | Status: COMPLETED | OUTPATIENT
Start: 2021-06-30 | End: 2021-06-30

## 2021-06-30 RX ORDER — PANTOPRAZOLE SODIUM 20 MG/1
40 TABLET, DELAYED RELEASE ORAL EVERY 12 HOURS
Refills: 0 | Status: DISCONTINUED | OUTPATIENT
Start: 2021-06-30 | End: 2021-07-02

## 2021-06-30 RX ORDER — FOLIC ACID 0.8 MG
1 TABLET ORAL DAILY
Refills: 0 | Status: DISCONTINUED | OUTPATIENT
Start: 2021-06-30 | End: 2021-07-02

## 2021-06-30 RX ORDER — MAGNESIUM SULFATE 500 MG/ML
4 VIAL (ML) INJECTION ONCE
Refills: 0 | Status: COMPLETED | OUTPATIENT
Start: 2021-06-30 | End: 2021-06-30

## 2021-06-30 RX ORDER — SODIUM,POTASSIUM PHOSPHATES 278-250MG
2 POWDER IN PACKET (EA) ORAL ONCE
Refills: 0 | Status: COMPLETED | OUTPATIENT
Start: 2021-06-30 | End: 2021-06-30

## 2021-06-30 RX ORDER — SODIUM CHLORIDE 9 MG/ML
2000 INJECTION, SOLUTION INTRAVENOUS ONCE
Refills: 0 | Status: COMPLETED | OUTPATIENT
Start: 2021-06-30 | End: 2021-06-30

## 2021-06-30 RX ORDER — ONDANSETRON 8 MG/1
4 TABLET, FILM COATED ORAL EVERY 6 HOURS
Refills: 0 | Status: DISCONTINUED | OUTPATIENT
Start: 2021-06-30 | End: 2021-07-01

## 2021-06-30 RX ORDER — PIPERACILLIN AND TAZOBACTAM 4; .5 G/20ML; G/20ML
3.38 INJECTION, POWDER, LYOPHILIZED, FOR SOLUTION INTRAVENOUS ONCE
Refills: 0 | Status: COMPLETED | OUTPATIENT
Start: 2021-06-30 | End: 2021-06-30

## 2021-06-30 RX ORDER — THIAMINE MONONITRATE (VIT B1) 100 MG
100 TABLET ORAL EVERY 24 HOURS
Refills: 0 | Status: DISCONTINUED | OUTPATIENT
Start: 2021-06-30 | End: 2021-07-02

## 2021-06-30 RX ORDER — SODIUM CHLORIDE 9 MG/ML
1000 INJECTION, SOLUTION INTRAVENOUS
Refills: 0 | Status: DISCONTINUED | OUTPATIENT
Start: 2021-06-30 | End: 2021-06-30

## 2021-06-30 RX ORDER — METOCLOPRAMIDE HCL 10 MG
10 TABLET ORAL ONCE
Refills: 0 | Status: COMPLETED | OUTPATIENT
Start: 2021-06-30 | End: 2021-06-30

## 2021-06-30 RX ORDER — PANTOPRAZOLE SODIUM 20 MG/1
40 TABLET, DELAYED RELEASE ORAL
Refills: 0 | Status: DISCONTINUED | OUTPATIENT
Start: 2021-06-30 | End: 2021-06-30

## 2021-06-30 RX ADMIN — Medication 100 MILLIEQUIVALENT(S): at 07:49

## 2021-06-30 RX ADMIN — Medication 100 MILLIEQUIVALENT(S): at 09:27

## 2021-06-30 RX ADMIN — Medication 40 MILLIEQUIVALENT(S): at 07:49

## 2021-06-30 RX ADMIN — Medication 2 PACKET(S): at 07:49

## 2021-06-30 RX ADMIN — Medication 1 MILLIGRAM(S): at 00:30

## 2021-06-30 RX ADMIN — ONDANSETRON 4 MILLIGRAM(S): 8 TABLET, FILM COATED ORAL at 00:25

## 2021-06-30 RX ADMIN — Medication 1 MILLIGRAM(S): at 11:37

## 2021-06-30 RX ADMIN — Medication 100 MILLIEQUIVALENT(S): at 10:45

## 2021-06-30 RX ADMIN — Medication 1 MILLIGRAM(S): at 00:58

## 2021-06-30 RX ADMIN — Medication 100 MILLIGRAM(S): at 07:04

## 2021-06-30 RX ADMIN — PANTOPRAZOLE SODIUM 40 MILLIGRAM(S): 20 TABLET, DELAYED RELEASE ORAL at 17:01

## 2021-06-30 RX ADMIN — Medication 100 MILLIGRAM(S): at 00:58

## 2021-06-30 RX ADMIN — POTASSIUM PHOSPHATE, MONOBASIC POTASSIUM PHOSPHATE, DIBASIC 83.33 MILLIMOLE(S): 236; 224 INJECTION, SOLUTION INTRAVENOUS at 07:51

## 2021-06-30 RX ADMIN — PANTOPRAZOLE SODIUM 40 MILLIGRAM(S): 20 TABLET, DELAYED RELEASE ORAL at 07:04

## 2021-06-30 RX ADMIN — SODIUM CHLORIDE 200 MILLILITER(S): 9 INJECTION, SOLUTION INTRAVENOUS at 07:12

## 2021-06-30 RX ADMIN — Medication 1 TABLET(S): at 07:04

## 2021-06-30 RX ADMIN — SODIUM CHLORIDE 2000 MILLILITER(S): 9 INJECTION, SOLUTION INTRAVENOUS at 00:26

## 2021-06-30 RX ADMIN — Medication 10 MILLIGRAM(S): at 00:26

## 2021-06-30 RX ADMIN — Medication 40 MILLIEQUIVALENT(S): at 16:07

## 2021-06-30 RX ADMIN — PIPERACILLIN AND TAZOBACTAM 200 GRAM(S): 4; .5 INJECTION, POWDER, LYOPHILIZED, FOR SOLUTION INTRAVENOUS at 00:25

## 2021-06-30 RX ADMIN — POTASSIUM PHOSPHATE, MONOBASIC POTASSIUM PHOSPHATE, DIBASIC 83.33 MILLIMOLE(S): 236; 224 INJECTION, SOLUTION INTRAVENOUS at 17:07

## 2021-06-30 NOTE — H&P ADULT - NSHPPHYSICALEXAM_GEN_ALL_CORE
Constitutional: NAD, resting comfortably in bed  Head: NC/AT  Eyes: PERRL, EOMI, anicteric sclera  ENT: no nasal discharge; uvula midline, no oropharyngeal erythema or exudates; MMM  Neck: supple; no JVD   Respiratory: CTA B/L; no W/R/R  Cardiac: +S1/S2; RRR; no M/R/G  Gastrointestinal: soft, NT/ND; no rebound or guarding; +BSx4  Back: spine midline, no bony tenderness or step-offs; no CVAT B/L  Extremities: WWP, no clubbing or cyanosis; no peripheral edema  Musculoskeletal: NROM x4; no joint swelling, tenderness or erythema  Vascular: 2+ radial, femoral, DP/PT pulses B/L  Dermatologic: skin warm, dry and intact; no rashes, wounds, or scars  Lymphatic: no submandibular or cervical LAD  Neurologic: AAOx3; CNII-XII grossly intact; no focal deficits  Psychiatric: affect and characteristics of appearance, verbalizations, behaviors are appropriate Constitutional: NAD, resting comfortably in bed  Head: NC/AT  Eyes: PERRL, EOMI, anicteric sclera  ENT: no nasal discharge; uvula midline, no oropharyngeal erythema or exudates; mucus membranes dry  Neck: supple; no JVD   Respiratory: CTA B/L; no W/R/R  Cardiac: tachycardic w/ regular rhythm, +S1/S2;  no M/R/G  Gastrointestinal: mild TTP in all 4 quadrants, soft, ND; no rebound or guarding; +BSx4  Back: spine midline, no bony tenderness or step-offs; no CVAT B/L  Extremities: WWP, no clubbing or cyanosis; no peripheral edema  Musculoskeletal: NROM x4; no joint swelling, tenderness or erythema  Vascular: 2+ radial, femoral, DP/PT pulses B/L  Dermatologic: skin warm, dry and intact; no rashes, wounds, or scars  Lymphatic: no submandibular or cervical LAD  Neurologic: AAOx3; CNII-XII grossly intact; no focal deficits  CIWA of 4 (+1 nausea, +2 tremors, +1 visual disturbances) Constitutional: NAD, resting comfortably in bed but intermittent somnolence  Head: NC/AT  Eyes: PERRL, EOMI, anicteric sclera  ENT: no nasal discharge; uvula midline, no oropharyngeal erythema or exudates; mucus membranes dry  Neck: supple; no JVD   Respiratory: CTA B/L; no W/R/R  Cardiac: tachycardic w/ regular rhythm, +S1/S2;  no M/R/G  Gastrointestinal: mild TTP in all 4 quadrants, soft, ND; no rebound or guarding; +BSx4  Back: spine midline, no bony tenderness or step-offs; no CVAT B/L  Extremities: WWP, no clubbing or cyanosis; no peripheral edema  Musculoskeletal: NROM x4; no joint swelling, tenderness or erythema  Vascular: 2+ radial, femoral, DP/PT pulses B/L  Dermatologic: skin warm, dry and intact; no rashes, wounds, or scars  Lymphatic: no submandibular or cervical LAD  Neurologic: AAOx3; CNII-XII grossly intact; no focal deficits  CIWA of 4 (+1 nausea, +2 tremors, +1 visual disturbances)

## 2021-06-30 NOTE — H&P ADULT - PROBLEM SELECTOR PLAN 5
Found to have hgb 10.9 with .4. Patient with small bouts of emesis yesterday, but none since. Also denies any BRBPR or melena. Likely secondary to chronic alcohol consumption.   - f/u iron panel  - maintain active T&S  - transfuse for Hgb < 7

## 2021-06-30 NOTE — PROGRESS NOTE ADULT - ASSESSMENT
45 y/o Female with PMHx chronic alcoholism, anxiety, depression who presents with N/V for 4 days with hematemesis yesterday, most recent emesis yesterday at 8:30pm, also p/w tremors 2/2 alcohol abuse and abdominal pain, admitted for alcohol withdrawal along with pancreatitis in setting of lipase >1500 and CT imaging consistent with diagnosis.  47 y/o Female with PMHx chronic alcoholism, anxiety, depression who presents with N/V for 4 days with hematemesis (described as coffee ground emesis) yesterday, most recent emesis 6/29 at 8:30pm, also p/w tremors 2/2 alcohol abuse and abdominal pain, admitted for alcohol withdrawal along with pancreatitis in setting of lipase >1500 and CT imaging consistent with diagnosis.

## 2021-06-30 NOTE — PROGRESS NOTE ADULT - PROBLEM SELECTOR PLAN 4
On admission found to have Anion gap 35 -> 23, lactate 5.8 -> 1.4 and sodium bicarb 9. UA also notable for ketones.   - BHB of 4.6 noted   -ABG showed pH of 7.41, pCO2 30, HCO3 19  -elevated blood glucose of 416 -->140  - s/p 2L LR and D5NS 2L in the ED  - f/u repeat BMP to monitor anion gap  - Likely is/o alcohol use versus gastritis. s/p reglan 10mg, zofran 4mg, and pantoprazole 80 mg IVP in the ED.   - c/w pantoprazole 40mg daily   - PRN zofran 4mg for nausea/vomiting  - QTc 444ms as of 6/29  -improved nausea today. Dysphagia screening passed.   -Patient started on clear liquid diet

## 2021-06-30 NOTE — PROGRESS NOTE ADULT - PROBLEM SELECTOR PLAN 3
Likely is/o alcohol use versus gastritis. s/p reglan 10mg, zofran 4mg, and pantoprazole 80 mg IVP in the ED.   - c/w pantoprazole 40mg daily   - PRN zofran 4mg for nausea/vomiting  - QTc 444ms as of 6/29 -patient reported a few episodes of coffee ground emesis on 6/29  -Patient reports a history of rectal bleeding in January 2021 and went to [facility]. EGD was planned at that time, but the patient left AMA  -Hgb of 10.9 in the ED, went down to 9.3. May be due to IVF, but will trend CBCs  -GI consulted to determine of EGD is recommended at this time  -Patient started on PPI BID -patient reported a few episodes of coffee ground emesis on 6/29  -Patient reports a history of rectal bleeding in January 2021 and went to [facility]. EGD was planned at that time, but the patient left AMA  -Hgb of 10.9 in the ED, went down to 9.3. May be due to IVF, but will trend CBCs  -GI consulted to determine of EGD is recommended at this time  -Patient started on Protonix BID

## 2021-06-30 NOTE — H&P ADULT - PROBLEM SELECTOR PLAN 1
Known history of chronic alcohol consumption. Typically consumes 0.5-1.0 L vodka per day. Patient found to have ITALO 41 on admission. Last reported drink 5pm yesterday. Reports history of withdrawals, but no seizures/DTs or intubations. Poor historian as unsure of last withdrawal.   - CIWA high risk protocol with PRN ativan   - starting thiamine/MV/folic acid   - low threshold to start librium taper Known history of chronic alcohol consumption. Typically consumes 0.5-1.0 L vodka per day. According to the  while at [facility], she has undergone alcohol detoxication many times in the past, but never went to rehab. Patient found to have ITALO 41 on admission. Last reported drink 5pm yesterday. Reports history of withdrawals, but no seizures/DTs or intubations. Poor historian as unsure of last withdrawal.   - CIWA high risk protocol with PRN ativan   - starting thiamine/MV/folic acid   - low threshold to start librium taper Known history of chronic alcohol consumption. Typically consumes 0.5-1.0 L vodka per day. According to the  while at [facility], she has undergone alcohol detoxication many times in the past, but never went to rehab. Patient found to have ITALO 41 on admission. Last reported drink 5pm yesterday. Reports history of withdrawals, but no seizures/DTs or intubations. Poor historian as unsure of last withdrawal.   - CIWA high risk protocol with PRN ativan   - starting thiamine/MV/folic acid   - low threshold to start librium taper  -CIWA of 4 (+1 nausea, +2 tremors, +1 visual disturbances) @5:00am

## 2021-06-30 NOTE — PATIENT PROFILE ADULT - DOES PATIENT HAVE ADVANCE DIRECTIVE
lethargic, unable to respond adequately You can access the FollowMyHealth Patient Portal offered by [facility] by registering at the following website: [email or web address]. By joining [employer]’s FollowMyHealth portal, you will also be able to view your health information using other applications (apps) compatible with our system.

## 2021-06-30 NOTE — H&P ADULT - NSICDXPASTMEDICALHX_GEN_ALL_CORE_FT
PAST MEDICAL HISTORY:  Alcohol abuse     Anxiety     Depression     Iron deficiency anemia, unspecified iron deficiency anemia type      PAST MEDICAL HISTORY:  Alcohol abuse     Anxiety     Depression     Iron deficiency anemia, unspecified iron deficiency anemia type     Liver fatty degeneration

## 2021-06-30 NOTE — ED ADULT NURSE REASSESSMENT NOTE - NURSING SKIN BRUISING LOCATION
generalized brusing to bue/ble as well as sacrum, pt unable to articulate reasons for all the bruising.

## 2021-06-30 NOTE — H&P ADULT - PROBLEM SELECTOR PLAN 4
On admission found to have Anion gap 35 -> 23, lactate 5.8 -> 1.4 and sodium bicarb 9. Likely in setting of starvation ketosis as UA also notable for ketones. However, underlying DKA cannot be ruled out as BHB not checked at [facility] and repeat BMP showed elevated blood glucose of 416.    - s/p 2L LR and D5NS 2L in the ED  - f/u repeat BMP to monitor anion gap  - f/u BHB  - f/u ABG given low serum bicarb

## 2021-06-30 NOTE — PROGRESS NOTE ADULT - PROBLEM SELECTOR PLAN 7
F: tolerating PO, no IVF  E: replete K<4, Mg<2. Phosphate of 0.4 today, repleted and repeat labs pending  N: NPO for now  VTE Prophylaxis: none (IMPROVE score 0)  GI: not needed  C: Full Code  Dispo: [facility] Mild transaminitis, AST 83/ALT 46  -Hepatitis panel pending Mild transaminitis, AST 83/ALT 46. Likely secondary to chronic alcohol use  -Hepatitis panel pending  -CTAP showed Severe hepatic steatosis. Smooth hepatic contour

## 2021-06-30 NOTE — H&P ADULT - PROBLEM SELECTOR PLAN 2
45 y/o Female with PMHx chronic alcoholism, anxiety, depression presenting with nausea and vomiting roughly every 2 hours since Saturday. She has a history of severe alcohol abuse for which she drinks 0.5-1.0 L of Vodka per day. During this time span she has been unable to keep up with PO liquids or foods, no food since saturday. Reports to vomiting some blood yesterday, but none today, most recent episode of emesis at 8:30 pm 6/29, still with residual nausea. Reports sensation of something stuck in the back of her throat.  Endorses intermittent abdominal pain, can radiate to her back. Denies diarrhea and ROS otherwise negative. According to the  while at [facility], she has undergone alcohol detoxication many times in the past, but never went to rehab. The patient denies history of alcohol withdrawal related seizures or ICU stays. Also denies history of pancreatitis.     ED Course at [facility]  Vitals: 97.9F oral, , /80, satting 96% on RA, RR 16  Labs: notable for Hgb 10.9, .4, K 2.9, HCO3 9, Mag 1.4, calcium 8.3 -> 6.7, T. bili 1.3 (direct 0.5), anion gap 35, AST 83/ALT 46, eGFR 52, lipase > 1500, lactate 5.8 -> 1.4, ITALO 41, trop I negative; UA notable for >80 ketones, protein 30, mod blood, glucose 500   Imaging: CT abd pelvis w/ IV - Interstitial edematous pancreatitis. Severe hepatic st On admission admits to intermittent abdominal pain with radiation to her back. Denies diarrhea and ROS otherwise negative. [facility] labs notable for lipase > 1500 with CT abd pelvis showing interstitial edematous pancreatitis.  - s/p 2L LR bolus + 2L D5NS in [facility]   - starting LR @ 200cc/hr x10 hours  - pain control  - f/u lipid profile  - f/u RUQ ultrasound given mild transaminitis and CT findings notable for hepatic steatosis

## 2021-06-30 NOTE — H&P ADULT - ASSESSMENT
45 y/o F p/w N/V for 4 days with hematemesis yesterday, most recent emesis yesterday at 8:30pm, also p/w tremors 2/2 alcohol abuse and abdominal pain, admitted with pancreatitis in setting of lipase >1500 and CT imaging consistent with diagnosis. Gissell 45 y/o Female with PMHx chronic alcoholism, anxiety, depression who presents with N/V for 4 days with hematemesis yesterday, most recent emesis yesterday at 8:30pm, also p/w tremors 2/2 alcohol abuse and abdominal pain, admitted for alcohol withdrawal along with pancreatitis in setting of lipase >1500 and CT imaging consistent with diagnosis.

## 2021-06-30 NOTE — H&P ADULT - ATTENDING COMMENTS
AshleePhilly [id number]  Ms Rosado is a 47 y/o female with anemia and alcohol dependence presented with N/V q2 hrs x4 days.  In the ED she had tremors, was given Ativan, was dehydrated and given IVF, lactate was 5.8 which dropped to 0.4, was given ceftriaxone and zosyn, lipase 1500.    This patient was evaluated with the resident, management decisions made, see above for the details  -intractable vomiting  -Alcohol dependence, with withdrawal, improved with Ativan  -acute pancreatitis  -anemia  -AGMA  >Ativan prn, CIWA, thiamine, folate, [ammonia]  >f/u CT abdomen results  >IVF  >blood gas, likely bicarb supplementation  >check B HCG, CXR, phosphate  See above for the details. NeanthonyPhilly [id number]  Ms Vivar is a 47 y/o female with anemia and alcohol dependence presented with N/V q2 hrs x4 days.  In the ED she had tremors, was given Ativan, was dehydrated and given IVF, lactate was 5.8 which dropped to 0.4, was given ceftriaxone and zosyn, lipase 1500.    This patient was evaluated with the resident, management decisions made, see above for the details  -intractable vomiting  -Alcohol dependence, with withdrawal, improved with Ativan  -acute pancreatitis  -anemia  -AGMA  >Ativan prn, CIWA, thiamine, folate, [ammonia]  >f/u CT abdomen results  >IVF  >blood gas, likely bicarb supplementation  >check B HCG, CXR, phosphate  See above for the details.

## 2021-06-30 NOTE — CONSULT NOTE ADULT - SUBJECTIVE AND OBJECTIVE BOX
Chief Complaint: nausea and vomiting    HPI:  45 y/o Female with PMHx chronic alcoholism, anxiety, depression presenting with nausea and vomiting roughly every 2 hours since Saturday. She has a history of severe alcohol abuse for which she drinks 0.5-1.0 L of Whiskey per day. During this time span she has been unable to keep up with PO liquids or foods, no food since Saturday. Reports to vomiting some brown liquid yesterday, but none today, most recent episode of emesis at 8:30 pm , still with residual nausea.  Reports sensation of something stuck in the back of her throat.  Endorses intermittent abdominal pain, can radiate to her back. Denies diarrhea and ROS otherwise negative. According to the  while at [facility], she has undergone alcohol detoxication many times in the past, but never went to rehab. The patient denies history of alcohol withdrawal related seizures or ICU stays. Also denies history of pancreatitis. GI was called for coffee ground emesis.    SH: Pt drinks whiskey 1/4 to 3/4 bottle per day. Last drink was  4-5pm    Allergies:  No Known Allergies      Home Medications:  metoprolol 25 mg oral tablet: 1 tab(s) orally once a day PRN palpitations  ondansetron 4 mg oral tablet: 1 tab(s) orally every 8 hours       Hospital Medications:  folic acid 1 milliGRAM(s) Oral daily  lactated ringers. 1000 milliLiter(s) IV Continuous <Continuous>  LORazepam   Injectable 2 milliGRAM(s) IV Push every 1 hour PRN  multivitamin 1 Tablet(s) Oral every 24 hours  ondansetron Injectable 4 milliGRAM(s) IV Push every 6 hours PRN  pantoprazole    Tablet 40 milliGRAM(s) Oral every 12 hours  thiamine 100 milliGRAM(s) Oral every 24 hours      Allergies:   No Known Allergies      PMHX/PSHX:  Anxiety    Depression    Alcohol abuse    Iron deficiency anemia, unspecified iron deficiency anemia type    Liver fatty degeneration    No significant past surgical history    No significant past surgical history        Family history:      Denies family history of colon cancer/polyps, stomach cancer/polyps, pancreatic cancer/masses, liver cancer/disease, ovarian cancer and endometrial cancer.    Social History:     Tob: Denies  EtOH: Denies  Illicit Drugs: Denies    ROS:     General:  No fevers, chills, night sweats, fatigue  Eyes:  Good vision, no reported pain  ENT:  No sore throat, pain, runny nose, (+) dysphagia, (+) hoarse voice  CV:  No pain, palpitations, hypo/hypertension  Pulm:  No dyspnea, cough, tachypnea, wheezing  GI:  Mild epigastric and lower abd pain, (+) nausea, (+) vomiting, No diarrhea, No constipation, No weight loss, No fever, No pruritis, No bright red blood per rectum, No tarry stools, No dysphagia,  :  No pain, bleeding, incontinence, nocturia  Muscle:  No pain, weakness  Neuro:  No weakness, tingling, memory problems (+) tremor  Psych:  No fatigue, insomnia, mood problems, depression  Endocrine:  No polyuria, polydipsia, cold/heat intolerance  Heme:  No petechiae, ecchymosis, easy bruisability  Skin:  No rash, tattoos, scars, edema    PHYSICAL EXAM:     Vital Signs:  Vital Signs Last 24 Hrs  T(C): 37.1 (2021 09:59), Max: 38.9 (2021 22:00)  T(F): 98.8 (2021 09:59), Max: 102 (2021 22:00)  HR: 106 (2021 08:25) (85 - 118)  BP: 145/81 (2021 08:25) (109/72 - 165/82)  BP(mean): 107 (2021 08:25) (89 - 107)  RR: 18 (2021 08:25) (14 - 18)  SpO2: 97% (2021 08:25) (95% - 99%)  Daily Height in cm: 175.26 (2021 20:54)    Daily     GENERAL:  No acute distress  HEENT:  Normocephalic/atraumatic, no scleral icterus  CHEST:  Clear to auscultation bilaterally, no wheezes/rales/ronchi, no accessory muscle use  HEART:  Regular rate and rhythm, no murmurs/rubs/gallops  ABDOMEN:  Soft, (+) tender in epigastric area and RLQ, non-distended, normoactive bowel sounds,  no masses, no hepato-splenomegaly, no signs of chronic liver disease  EXTREMITIES: No cyanosis, clubbing, or edema  SKIN:  No rash/erythema  NEURO:  Alert and oriented x 3, tremor    LABS:                        9.3    5.97  )-----------( 135      ( 2021 06:31 )             30.1     Mean Cell Volume: 102.7 fl (-21 @ 06:31)        139  |  101  |  9   ----------------------------<  148<H>  2.8<LL>   |  19<L>  |  0.65    Ca    7.6<L>      2021 06:31  Phos  0.4       Mg     2.2         TPro  6.5  /  Alb  3.2<L>  /  TBili  1.1  /  DBili  x   /  AST  65<H>  /  ALT  33  /  AlkPhos  43  30    LIVER FUNCTIONS - ( 2021 01:14 )  Alb: 3.2 g/dL / Pro: 6.5 g/dL / ALK PHOS: 43 U/L / ALT: 33 U/L / AST: 65 U/L / GGT: x           PT/INR - ( 2021 22:13 )   PT: 13.0 sec;   INR: 1.11          PTT - ( 2021 22:13 )  PTT:25.4 sec  Urinalysis Basic - ( 2021 01:53 )    Color: Yellow / Appearance: Clear / S.020 / pH: x  Gluc: x / Ketone: >=80 mg/dL  / Bili: NEGATIVE / Urobili: 0.2 E.U./dL   Blood: x / Protein: 30 mg/dL / Nitrite: NEGATIVE   Leuk Esterase: NEGATIVE / RBC: < 5 /HPF / WBC < 5 /HPF   Sq Epi: x / Non Sq Epi: x / Bacteria: x      Amylase Serum--      Lipase serum--       [id number]  Amylase Serum--      Lipase serum>1500       Ammonia--                          9.3    5.97  )-----------( 135      ( 2021 06:31 )             30.1                         10.9   10.35 )-----------( 178      ( 2021 21:25 )             34.6       Imaging:    < from: CT Abdomen and Pelvis w/ IV Cont (21 @ 02:41) >  Findings:    Lower chest: Normal.    Liver:  Severe hepatic steatosis. Smooth hepatic contour. 2.7 cm segment 7 lesion, possibly a hemangioma, although indeterminate given the background of severe steatosis. Few additional too small to characterize hypodense lesions.    Gallbladder: No radiopaque stones gallbladder.    Spleen:  Normal.    Pancreas:  Small peripancreatic fluid, particularly prominent around the pancreatic tail. No organized fluid collection or parenchymal necrosis.    Adrenal glands:  Normal.    Kidneys: Normal.    Adenopathy:  Nolymphadenopathy in abdomen or pelvis.    Gastrointestinal tract: Normal. Normal appendix.    Vessels: Normal.    Pelvic organs: 5 cm probable exophytic fundal fibroid. Unremarkable adnexa. Unremarkable urinary bladder.    Soft tissues: Normal.    Bones: Normal.    Impression:  1.  Interstitial edematous pancreatitis.  2.  Severe hepatic steatosis.    < end of copied text >      Reviewed

## 2021-06-30 NOTE — PROGRESS NOTE ADULT - PROBLEM SELECTOR PLAN 1
Known history of chronic alcohol consumption. Typically consumes 0.5-1.0 L vodka per day. According to the  while at [facility], she has undergone alcohol detoxication many times in the past, but never went to rehab. Patient found to have ITALO 41 on admission. Last reported drink 5pm yesterday. Reports history of withdrawals, but no seizures/DTs or intubations. Poor historian as unsure of last withdrawal.   - CIWA high risk protocol with PRN ativan   -CIWA at present of 0  - starting thiamine/MV/folic acid   - low threshold to start librium taper Known history of chronic alcohol consumption. Typically consumes 0.5-1.0 L vodka per day. According to the  while at [facility], she has undergone alcohol detoxication many times in the past, but never went to rehab. Patient found to have ITALO 41 on admission. Last reported drink 5pm yesterday. Reports history of withdrawals, but no seizures/DTs or intubations. Poor historian as unsure of last withdrawal.   - CIWA high risk protocol with PRN ativan   -CIWA of 0 on evaluation today   - starting thiamine/MV/folic acid   - low threshold to start librium taper

## 2021-06-30 NOTE — H&P ADULT - PROBLEM SELECTOR PLAN 6
F: tolerating PO, no IVF  E: replete K<4, Mg<2  N: NPO for now  VTE Prophylaxis: none (IMPROVE score 0)  GI: not needed  C: Full Code  Dispo: [facility]

## 2021-06-30 NOTE — PROGRESS NOTE ADULT - PROBLEM SELECTOR PLAN 5
Found to have hgb 10.9 with .4. Patient with small bouts of emesis yesterday, but none since. Also denies any BRBPR or melena. Likely secondary to chronic alcohol consumption.   - f/u iron panel  - maintain active T&S  - transfuse for Hgb < 7 On admission found to have Anion gap 35 -> 23, lactate 5.8 -> 1.4 and sodium bicarb 9. UA also notable for ketones.   - BHB of 4.6 noted   -ABG showed pH of 7.41, pCO2 30, HCO3 19  -elevated blood glucose of 416 -->140  - s/p 2L LR and D5NS 2L in the ED  - likely secondary to starvation ketosis, as patient has not been tolerating PO intake for the past few days  - f/u repeat BMP to monitor anion gap

## 2021-06-30 NOTE — PROGRESS NOTE ADULT - SUBJECTIVE AND OBJECTIVE BOX
**INCOMPLETE NOTE    OVERNIGHT EVENTS:    SUBJECTIVE:  Patient seen and examined at bedside. Patient reports improvement in nausea and states her last episode of vomiting was  at night. Patient continues to complain of feeling like something is stuck in the back of her throat. Patient denies abdominal pain at present, but states it comes and goes. Patient describes the pain as located in RUQ and LUQ with mild radiation to the back. Patient denies chest pain, shortness of breath, dizziness or headache. Patient denies tremors, hallucinations or agitation.    Vital Signs Last 12 Hrs  T(F): 99.2 (21 @ 05:13), Max: 102 (21 @ 22:00)  HR: 104 (21 @ 04:58) (85 - 118)  BP: 122/70 (21 @ 04:58) (109/72 - 165/82)  BP(mean): 89 (21 @ 04:58) (89 - 89)  RR: 18 (21 @ 04:58) (14 - 18)  SpO2: 95% (21 @ 04:58) (95% - 99%)  I&O's Summary      PHYSICAL EXAM:  Constitutional: NAD, comfortable in bed. No tremors noted. CIWA 0  HEENT: NC/AT, PERRLA, EOMI, no conjunctival pallor or scleral icterus, MMM  Neck: Supple, no JVD  Respiratory: CTA B/L. No w/r/r.   Cardiovascular: RRR, normal S1 and S2, no m/r/g.   Gastrointestinal: +BS, soft NTND, no guarding or rebound tenderness, no palpable masses   Extremities: wwp; no cyanosis, clubbing or edema.   Vascular: Pulses equal and strong throughout.   Neurological: AAOx3, no CN deficits, strength and sensation intact throughout.   Skin: No gross skin abnormalities or rashes        LABS:                        9.3    5.97  )-----------( 135      ( 2021 06:31 )             30.1     06-    139  |  101  |  9   ----------------------------<  148<H>  2.8<LL>   |  19<L>  |  0.65    Ca    7.6<L>      2021 06:31  Phos  0.4     06-30  Mg     2.2     06-30    TPro  6.5  /  Alb  3.2<L>  /  TBili  1.1  /  DBili  x   /  AST  65<H>  /  ALT  33  /  AlkPhos  43  06-30    PT/INR - ( 2021 22:13 )   PT: 13.0 sec;   INR: 1.11          PTT - ( 2021 22:13 )  PTT:25.4 sec  Urinalysis Basic - ( 2021 01:53 )    Color: Yellow / Appearance: Clear / S.020 / pH: x  Gluc: x / Ketone: >=80 mg/dL  / Bili: NEGATIVE / Urobili: 0.2 E.U./dL   Blood: x / Protein: 30 mg/dL / Nitrite: NEGATIVE   Leuk Esterase: NEGATIVE / RBC: < 5 /HPF / WBC < 5 /HPF   Sq Epi: x / Non Sq Epi: x / Bacteria: x          RADIOLOGY & ADDITIONAL TESTS:    MEDICATIONS  (STANDING):  folic acid 1 milliGRAM(s) Oral daily  lactated ringers. 1000 milliLiter(s) (200 mL/Hr) IV Continuous <Continuous>  multivitamin 1 Tablet(s) Oral every 24 hours  pantoprazole    Tablet 40 milliGRAM(s) Oral before breakfast  potassium chloride   Powder 40 milliEquivalent(s) Oral once  potassium chloride  10 mEq/100 mL IVPB 10 milliEquivalent(s) IV Intermittent every 1 hour  potassium phosphate / sodium phosphate Powder (PHOS-NaK) 2 Packet(s) Oral once  potassium phosphate IVPB 30 milliMole(s) IV Intermittent once  thiamine 100 milliGRAM(s) Oral every 24 hours    MEDICATIONS  (PRN):  ondansetron Injectable 4 milliGRAM(s) IV Push every 6 hours PRN Nausea and/or Vomiting   OVERNIGHT EVENTS: None.    SUBJECTIVE:  Patient seen and examined at bedside. Patient reports improvement in nausea and states her last episode of vomiting was  at night. Patient continues to complain of feeling like something is stuck in the back of her throat. Patient denies abdominal pain at present, but states it comes and goes. Patient describes the pain as located in RUQ and LUQ with mild radiation to the back. Patient denies chest pain, shortness of breath, dizziness or headache. Patient denies tremors, hallucinations or agitation.    Vital Signs Last 12 Hrs  T(F): 99.2 (21 @ 05:13), Max: 102 (21 @ 22:00)  HR: 104 (21 @ 04:58) (85 - 118)  BP: 122/70 (21 @ 04:58) (109/72 - 165/82)  BP(mean): 89 (21 @ 04:58) (89 - 89)  RR: 18 (21 @ 04:58) (14 - 18)  SpO2: 95% (21 @ 04:58) (95% - 99%)  I&O's Summary      PHYSICAL EXAM:  Constitutional: NAD, comfortable in bed. No tremors noted. CIWA 0  HEENT: NC/AT, PERRLA, EOMI, no conjunctival pallor or scleral icterus, mucous membranes mildly dry. No tongue fasiculations  Neck: Supple, no JVD  Respiratory: CTA B/L. No w/r/r.   Cardiovascular: RRR, normal S1 and S2, no m/r/g.   Gastrointestinal: +BS, soft NTND, no guarding or rebound tenderness, no palpable masses   Extremities: wwp; no cyanosis, clubbing or edema.   Vascular: Pulses equal and strong throughout.   Neurological: AAOx3, no CN deficits, strength and sensation intact throughout. No hallucinations  Skin: No gross skin abnormalities or rashes        LABS:                        9.3    5.97  )-----------( 135      ( 2021 06:31 )             30.1     06-    139  |  101  |  9   ----------------------------<  148<H>  2.8<LL>   |  19<L>  |  0.65    Ca    7.6<L>      2021 06:31  Phos  0.4     06-30  Mg     2.2     06-30    TPro  6.5  /  Alb  3.2<L>  /  TBili  1.1  /  DBili  x   /  AST  65<H>  /  ALT  33  /  AlkPhos  43  06-30    PT/INR - ( 2021 22:13 )   PT: 13.0 sec;   INR: 1.11          PTT - ( 2021 22:13 )  PTT:25.4 sec  Urinalysis Basic - ( 2021 01:53 )    Color: Yellow / Appearance: Clear / S.020 / pH: x  Gluc: x / Ketone: >=80 mg/dL  / Bili: NEGATIVE / Urobili: 0.2 E.U./dL   Blood: x / Protein: 30 mg/dL / Nitrite: NEGATIVE   Leuk Esterase: NEGATIVE / RBC: < 5 /HPF / WBC < 5 /HPF   Sq Epi: x / Non Sq Epi: x / Bacteria: x          RADIOLOGY & ADDITIONAL TESTS:    MEDICATIONS  (STANDING):  folic acid 1 milliGRAM(s) Oral daily  lactated ringers. 1000 milliLiter(s) (200 mL/Hr) IV Continuous <Continuous>  multivitamin 1 Tablet(s) Oral every 24 hours  pantoprazole    Tablet 40 milliGRAM(s) Oral before breakfast  potassium chloride   Powder 40 milliEquivalent(s) Oral once  potassium chloride  10 mEq/100 mL IVPB 10 milliEquivalent(s) IV Intermittent every 1 hour  potassium phosphate / sodium phosphate Powder (PHOS-NaK) 2 Packet(s) Oral once  potassium phosphate IVPB 30 milliMole(s) IV Intermittent once  thiamine 100 milliGRAM(s) Oral every 24 hours    MEDICATIONS  (PRN):  ondansetron Injectable 4 milliGRAM(s) IV Push every 6 hours PRN Nausea and/or Vomiting   HOSPITAL COURSE: 45 y/o Female with PMHx chronic alcoholism, anxiety, depression presenting with nausea and vomiting roughly every 2 hours since Saturday. She has a history of severe alcohol abuse for which she drinks 0.5-1.0 L of Vodka per day. During this time span she has been unable to keep up with PO liquids or foods, no food since saturday (). Reports to vomiting some blood yesterday (), described as coffee ground emesis but none today, most recent episode of emesis at 8:30 pm , still with residual nausea. Reports sensation of something stuck in the back of her throat.  Endorses intermittent abdominal pain, can radiate to her back. Denies diarrhea and ROS otherwise negative. According to the  while at [facility], she has undergone alcohol detoxication many times in the past, but never went to rehab. The patient denies history of alcohol withdrawal related seizures or ICU stays. Also denies history of pancreatitis. In the ED, lipase >1500, K of 2.9, Mg of 1.4, lactate of 5.8 (improved to 1.4) and anion gap of 35 (improved to 19). CT AP consistent with pancreatitis. Patient started on IVF and received one dose of Vanc and Zosyn in the ED. Patient's CIWA remained 0-4 during admission and tremors resolved. GI consulted, regarding coffee ground emesis and mild drop of Hgb (10.9-->9.3). No EGD needed at this time, recommended to continue with PPI BID. Macrocytic anemia noted, likely secondary to chronic alcohol use, B12 and folate pending. Phosphorus of 0.4 on , repleted and will continue to monitor. Mild transaminitis with AST 83/ALT 46. Hepatitis panel pending.     OVERNIGHT EVENTS: None.    SUBJECTIVE:  Patient seen and examined at bedside. Patient reports improvement in nausea and states her last episode of vomiting was  at night. Patient continues to complain of feeling like something is stuck in the back of her throat. Patient denies abdominal pain at present, but states it comes and goes. Patient describes the pain as located in RUQ and LUQ with mild radiation to the back. Patient denies chest pain, shortness of breath, dizziness or headache. Patient denies tremors, hallucinations or agitation.    Vital Signs Last 12 Hrs  T(F): 99.2 (21 @ 05:13), Max: 102 (21 @ 22:00)  HR: 104 (21 @ 04:58) (85 - 118)  BP: 122/70 (21 @ 04:58) (109/72 - 165/82)  BP(mean): 89 (21 @ 04:58) (89 - 89)  RR: 18 (21 @ 04:58) (14 - 18)  SpO2: 95% (21 @ 04:58) (95% - 99%)  I&O's Summary      PHYSICAL EXAM:  Constitutional: NAD, comfortable in bed. No tremors noted. CIWA 0  HEENT: NC/AT, PERRLA, EOMI, no conjunctival pallor or scleral icterus, mucous membranes mildly dry. No tongue fasiculations  Neck: Supple, no JVD  Respiratory: CTA B/L. No w/r/r.   Cardiovascular: RRR, normal S1 and S2, no m/r/g.   Gastrointestinal: +BS, soft NTND, no guarding or rebound tenderness, no palpable masses   Extremities: wwp; no cyanosis, clubbing or edema.   Vascular: Pulses equal and strong throughout.   Neurological: AAOx3, no CN deficits, strength and sensation intact throughout. No hallucinations  Skin: No gross skin abnormalities or rashes        LABS:                        9.3    5.97  )-----------( 135      ( 2021 06:31 )             30.1     06-30    139  |  101  |  9   ----------------------------<  148<H>  2.8<LL>   |  19<L>  |  0.65    Ca    7.6<L>      2021 06:31  Phos  0.4       Mg     2.2     -30    TPro  6.5  /  Alb  3.2<L>  /  TBili  1.1  /  DBili  x   /  AST  65<H>  /  ALT  33  /  AlkPhos  43  06-30    PT/INR - ( 2021 22:13 )   PT: 13.0 sec;   INR: 1.11          PTT - ( 2021 22:13 )  PTT:25.4 sec  Urinalysis Basic - ( 2021 01:53 )    Color: Yellow / Appearance: Clear / S.020 / pH: x  Gluc: x / Ketone: >=80 mg/dL  / Bili: NEGATIVE / Urobili: 0.2 E.U./dL   Blood: x / Protein: 30 mg/dL / Nitrite: NEGATIVE   Leuk Esterase: NEGATIVE / RBC: < 5 /HPF / WBC < 5 /HPF   Sq Epi: x / Non Sq Epi: x / Bacteria: x          RADIOLOGY & ADDITIONAL TESTS:    MEDICATIONS  (STANDING):  folic acid 1 milliGRAM(s) Oral daily  lactated ringers. 1000 milliLiter(s) (200 mL/Hr) IV Continuous <Continuous>  multivitamin 1 Tablet(s) Oral every 24 hours  pantoprazole    Tablet 40 milliGRAM(s) Oral before breakfast  potassium chloride   Powder 40 milliEquivalent(s) Oral once  potassium chloride  10 mEq/100 mL IVPB 10 milliEquivalent(s) IV Intermittent every 1 hour  potassium phosphate / sodium phosphate Powder (PHOS-NaK) 2 Packet(s) Oral once  potassium phosphate IVPB 30 milliMole(s) IV Intermittent once  thiamine 100 milliGRAM(s) Oral every 24 hours    MEDICATIONS  (PRN):  ondansetron Injectable 4 milliGRAM(s) IV Push every 6 hours PRN Nausea and/or Vomiting   HOSPITAL COURSE: 45 y/o Female with PMHx chronic alcoholism, anxiety, depression presenting with nausea and vomiting roughly every 2 hours since Saturday. She has a history of severe alcohol abuse for which she drinks 0.5-1.0 L of Vodka per day. During this time span she has been unable to keep up with PO liquids or foods, no food since saturday (). Reports to vomiting some blood yesterday (), described as coffee ground emesis but none today, most recent episode of emesis at 8:30 pm , still with residual nausea. Reports sensation of something stuck in the back of her throat.  Endorses intermittent abdominal pain, can radiate to her back. Denies diarrhea and ROS otherwise negative. According to the  while at [facility], she has undergone alcohol detoxication many times in the past, but never went to rehab. The patient denies history of alcohol withdrawal related seizures or ICU stays. Also denies history of pancreatitis. In the ED, lipase >1500, K of 2.9, Mg of 1.4, lactate of 5.8 (improved to 1.4) and anion gap of 35 (improved to 19). CT AP consistent with pancreatitis. Patient started on IVF and received one dose of Vanc and Zosyn in the ED. Patient's CIWA remained 0-4 during admission and tremors resolved. GI consulted, regarding coffee ground emesis and mild drop of Hgb (10.9-->9.3). No EGD needed at this time, recommended to continue with PPI BID. Macrocytic anemia noted, likely secondary to chronic alcohol use, B12 and folate pending. Phosphorus of 0.4 on , repleted and will continue to monitor. Mild transaminitis with AST 83/ALT 46. Hepatitis panel pending. CTAP showed Severe hepatic steatosis. Smooth hepatic contour.    OVERNIGHT EVENTS: None.    SUBJECTIVE:  Patient seen and examined at bedside. Patient reports improvement in nausea and states her last episode of vomiting was  at night. Patient continues to complain of feeling like something is stuck in the back of her throat. Patient denies abdominal pain at present, but states it comes and goes. Patient describes the pain as located in RUQ and LUQ with mild radiation to the back. Patient denies chest pain, shortness of breath, dizziness or headache. Patient denies tremors, hallucinations or agitation.    Vital Signs Last 12 Hrs  T(F): 99.2 (21 @ 05:13), Max: 102 (21 @ 22:00)  HR: 104 (21 @ 04:58) (85 - 118)  BP: 122/70 (21 @ 04:58) (109/72 - 165/82)  BP(mean): 89 (21 @ 04:58) (89 - 89)  RR: 18 (21 @ 04:58) (14 - 18)  SpO2: 95% (21 @ 04:58) (95% - 99%)  I&O's Summary      PHYSICAL EXAM:  Constitutional: NAD, comfortable in bed. No tremors noted. CIWA 0  HEENT: NC/AT, PERRLA, EOMI, no conjunctival pallor or scleral icterus, mucous membranes mildly dry. No tongue fasiculations  Neck: Supple, no JVD  Respiratory: CTA B/L. No w/r/r.   Cardiovascular: RRR, normal S1 and S2, no m/r/g.   Gastrointestinal: +BS, soft NTND, no guarding or rebound tenderness, no palpable masses   Extremities: wwp; no cyanosis, clubbing or edema.   Vascular: Pulses equal and strong throughout.   Neurological: AAOx3, no CN deficits, strength and sensation intact throughout. No hallucinations  Skin: No gross skin abnormalities or rashes        LABS:                        9.3    5.97  )-----------( 135      ( 2021 06:31 )             30.1     -    139  |  101  |  9   ----------------------------<  148<H>  2.8<LL>   |  19<L>  |  0.65    Ca    7.6<L>      2021 06:31  Phos  0.4       Mg     2.2         TPro  6.5  /  Alb  3.2<L>  /  TBili  1.1  /  DBili  x   /  AST  65<H>  /  ALT  33  /  AlkPhos  43  -30    PT/INR - ( 2021 22:13 )   PT: 13.0 sec;   INR: 1.11          PTT - ( 2021 22:13 )  PTT:25.4 sec  Urinalysis Basic - ( 2021 01:53 )    Color: Yellow / Appearance: Clear / S.020 / pH: x  Gluc: x / Ketone: >=80 mg/dL  / Bili: NEGATIVE / Urobili: 0.2 E.U./dL   Blood: x / Protein: 30 mg/dL / Nitrite: NEGATIVE   Leuk Esterase: NEGATIVE / RBC: < 5 /HPF / WBC < 5 /HPF   Sq Epi: x / Non Sq Epi: x / Bacteria: x          RADIOLOGY & ADDITIONAL TESTS:    MEDICATIONS  (STANDING):  folic acid 1 milliGRAM(s) Oral daily  lactated ringers. 1000 milliLiter(s) (200 mL/Hr) IV Continuous <Continuous>  multivitamin 1 Tablet(s) Oral every 24 hours  pantoprazole    Tablet 40 milliGRAM(s) Oral before breakfast  potassium chloride   Powder 40 milliEquivalent(s) Oral once  potassium chloride  10 mEq/100 mL IVPB 10 milliEquivalent(s) IV Intermittent every 1 hour  potassium phosphate / sodium phosphate Powder (PHOS-NaK) 2 Packet(s) Oral once  potassium phosphate IVPB 30 milliMole(s) IV Intermittent once  thiamine 100 milliGRAM(s) Oral every 24 hours    MEDICATIONS  (PRN):  ondansetron Injectable 4 milliGRAM(s) IV Push every 6 hours PRN Nausea and/or Vomiting

## 2021-06-30 NOTE — H&P ADULT - NSHPLABSRESULTS_GEN_ALL_CORE
.  LABS                        10.9   10.35 )-----------( 178      ( 2021 21:25 )             34.6         137  |  101  |  11  ----------------------------<  416<H>  3.0<L>   |  13<L>  |  0.94    Ca    6.7<L>      2021 01:14  Mg     1.9         TPro  6.5  /  Alb  3.2<L>  /  TBili  1.1  /  DBili  x   /  AST  65<H>  /  ALT  33  /  AlkPhos  43  30    PT/INR - ( 2021 22:13 )   PT: 13.0 sec;   INR: 1.11          PTT - ( 2021 22:13 )  PTT:25.4 sec  Urinalysis Basic - ( 2021 01:53 )    Color: Yellow / Appearance: Clear / S.020 / pH: x  Gluc: x / Ketone: >=80 mg/dL  / Bili: NEGATIVE / Urobili: 0.2 E.U./dL   Blood: x / Protein: 30 mg/dL / Nitrite: NEGATIVE   Leuk Esterase: NEGATIVE / RBC: < 5 /HPF / WBC < 5 /HPF   Sq Epi: x / Non Sq Epi: x / Bacteria: x      CARDIAC MARKERS ( 2021 21:25 )  <0.017 ng/mL / x     / x     / x     / x            RADIOLOGY & ADDITIONAL TESTS: Reviewed

## 2021-06-30 NOTE — H&P ADULT - NSHPSOCIALHISTORY_GEN_ALL_CORE
Patient lives with  in [facility]. Significant alcohol abuse drinking half bottle of scotch per day. Denies cigarette/drug use.

## 2021-06-30 NOTE — PROGRESS NOTE ADULT - PROBLEM SELECTOR PLAN 8
F: tolerating PO, no IVF  E: replete K<4, Mg<2. Phosphate of 0.4 today, repleted and repeat labs pending  N: NPO for now  VTE Prophylaxis: none (IMPROVE score 0)  GI: not needed  C: Full Code  Dispo: [facility] F: tolerating PO  E: replete K<4, Mg<2. Phosphate of 0.4 today, repleted and repeat labs pending  N: NPO for now  VTE Prophylaxis: none (IMPROVE score 0)  GI: not needed  C: Full Code  [facility]

## 2021-06-30 NOTE — H&P ADULT - PROBLEM SELECTOR PLAN 3
Likely is/o alcohol use versus gastritis. s/p reglan 10mg, zofran 4mg, and pantoprazole 80 mg IVP in the ED.   - c/w pantoprazole 40mg daily   - PRN zofran 4mg for nausea/vomiting  - QTc 444ms as of 6/29

## 2021-06-30 NOTE — PROGRESS NOTE ADULT - PROBLEM SELECTOR PLAN 2
On admission admits to intermittent abdominal pain with radiation to her back. Denies diarrhea and ROS otherwise negative. [facility] labs notable for lipase > 1500 with CT abd pelvis showing interstitial edematous pancreatitis.  - s/p 2L LR bolus + 2L D5NS in [facility]   - starting LR @ 200cc/hr x10 hours  - pain control  - f/u lipid profile  - f/u RUQ ultrasound given mild transaminitis and CT findings notable for hepatic steatosis On admission admits to intermittent abdominal pain with radiation to her back. Denies diarrhea and ROS otherwise negative. [facility] labs notable for lipase > 1500 with CT abd pelvis showing interstitial edematous pancreatitis.  - s/p 2L LR bolus + 2L D5NS in [facility]   - starting LR @ 200cc/hr x10 hours  - pain control  - f/u lipid profile  -repeat lipase pending

## 2021-06-30 NOTE — PROGRESS NOTE ADULT - PROBLEM SELECTOR PLAN 6
F: tolerating PO, no IVF  E: replete K<4, Mg<2  N: NPO for now  VTE Prophylaxis: none (IMPROVE score 0)  GI: not needed  C: Full Code  Dispo: [facility] Found to have hgb 10.9 with .4. Patient with small bouts of emesis yesterday, but none since. Also denies any BRBPR or melena. Likely secondary to chronic alcohol consumption.   - f/u iron panel  - maintain active T&S  - transfuse for Hgb < 7  -B12 and folate levels pending

## 2021-06-30 NOTE — ED ADULT NURSE REASSESSMENT NOTE - NS ED NURSE REASSESS COMMENT FT1
Pt received from TASHI Velez. Pt asleep in stretcher, no indicators of pain present. Breathing spontaneous and unlabored. Pt awaiting urine results before being sent for CT.

## 2021-06-30 NOTE — H&P ADULT - HISTORY OF PRESENT ILLNESS
45 y/o Female with PMHx chronic alcoholism, anxiety, depression presenting with nausea and vomiting roughly every 2 hours since Saturday. She has a history of severe alcohol abuse for which she drinks 0.5-1.0 L of Vodka per day. During this time span she has been unable to keep up with PO liquids or foods. Reports to vomiting some blood yesterday, but none today. Also denies any abdominal pain or diarrhea. According to the  while at [facility], [place], she has undergone alcohol detoxication many times in the past, but never went to rehab. The patient denies history of alcohol withdrawal related seizures or ICU stays. Also denies history of pancreatitis.     ED Course at [facility] [place]  Vitals: 97.9F oral, , /80, satting 96% on RA, RR 16  Labs: notable for Hgb 10.9, .4, K 2.9, HCO3 9, Mag 1.4, calcium 8.3 -> 6.7, T. bili 1.3 (direct 0.5), anion gap 35, AST 83/ALT 46, eGFR 52, lipase > 1500, lactate 5.8 -> 1.4, ITALO 41, trop I negative; UA notable for >80 ketones, protein 30, mod blood, glucose 500   Imaging: CT abd pelvis w/ IV - Interstitial edematous pancreatitis. Severe hepatic steatosis.  Management: CTX 1g, zosyn 3.375g x1, tylenol 650mg rectal x1, ativan 1mg x1, ativan 2mg x1, reglan 10mg IVP x1, zofran 4mg IVP x1, pantoprazole 80mg IVP x1, Mag 2g, KCl 10mEq x3, thiamine 100 mg IVP x1, 2L LR bolus + 2L D5NS INCOMPLETE NOTE    47 y/o Female with PMHx chronic alcoholism, anxiety, depression presenting with nausea and vomiting roughly every 2 hours since Saturday. She has a history of severe alcohol abuse for which she drinks 0.5-1.0 L of Vodka per day. During this time span she has been unable to keep up with PO liquids or foods. Reports to vomiting some blood yesterday, but none today. Also denies any abdominal pain or diarrhea. According to the  while at [facility], she has undergone alcohol detoxication many times in the past, but never went to rehab. The patient denies history of alcohol withdrawal related seizures or ICU stays. Also denies history of pancreatitis.     ED Course at [facility]  Vitals: 97.9F oral, , /80, satting 96% on RA, RR 16  Labs: notable for Hgb 10.9, .4, K 2.9, HCO3 9, Mag 1.4, calcium 8.3 -> 6.7, T. bili 1.3 (direct 0.5), anion gap 35, AST 83/ALT 46, eGFR 52, lipase > 1500, lactate 5.8 -> 1.4, ITALO 41, trop I negative; UA notable for >80 ketones, protein 30, mod blood, glucose 500   Imaging: CT abd pelvis w/ IV - Interstitial edematous pancreatitis. Severe hepatic steatosis.  Management: CTX 1g, zosyn 3.375g x1, tylenol 650mg rectal x1, ativan 1mg x1, ativan 2mg x1, reglan 10mg IVP x1, zofran 4mg IVP x1, pantoprazole 80mg IVP x1, Mag 2g, KCl 10mEq x3, thiamine 100 mg IVP x1, 2L LR bolus + 2L D5NS INCOMPLETE NOTE    45 y/o Female with PMHx chronic alcoholism, anxiety, depression presenting with nausea and vomiting roughly every 2 hours since Saturday. She has a history of severe alcohol abuse for which she drinks 0.5-1.0 L of Vodka per day. During this time span she has been unable to keep up with PO liquids or foods, no food since saturday. Reports to vomiting some blood yesterday, but none today, most recent episode of emesis at 8:30 pm 6/29, still with residual nausea. Reports sensation of something stuck in the back of her throat.  Endorses intermittent abdominal pain, can radiate to her back. Denies  diarrhea and ROS otherwise negative. According to the  while at [facility], she has undergone alcohol detoxication many times in the past, but never went to rehab. The patient denies history of alcohol withdrawal related seizures or ICU stays. Also denies history of pancreatitis.     ED Course at [facility]  Vitals: 97.9F oral, , /80, satting 96% on RA, RR 16  Labs: notable for Hgb 10.9, .4, K 2.9, HCO3 9, Mag 1.4, calcium 8.3 -> 6.7, T. bili 1.3 (direct 0.5), anion gap 35, AST 83/ALT 46, eGFR 52, lipase > 1500, lactate 5.8 -> 1.4, ITALO 41, trop I negative; UA notable for >80 ketones, protein 30, mod blood, glucose 500   Imaging: CT abd pelvis w/ IV - Interstitial edematous pancreatitis. Severe hepatic steatosis.  Management: CTX 1g, zosyn 3.375g x1, tylenol 650mg rectal x1, ativan 1mg x1, ativan 2mg x1, reglan 10mg IVP x1, zofran 4mg IVP x1, pantoprazole 80mg IVP x1, Mag 2g, KCl 10mEq x3, thiamine 100 mg IVP x1, 2L LR bolus + 2L D5NS 45 y/o Female with PMHx chronic alcoholism, anxiety, depression presenting with nausea and vomiting roughly every 2 hours since Saturday. She has a history of severe alcohol abuse for which she drinks 0.5-1.0 L of Vodka per day. During this time span she has been unable to keep up with PO liquids or foods, no food since saturday. Reports to vomiting some blood yesterday, but none today, most recent episode of emesis at 8:30 pm 6/29, still with residual nausea. Reports sensation of something stuck in the back of her throat.  Endorses intermittent abdominal pain, can radiate to her back. Denies diarrhea and ROS otherwise negative. According to the  while at [facility], she has undergone alcohol detoxication many times in the past, but never went to rehab. The patient denies history of alcohol withdrawal related seizures or ICU stays. Also denies history of pancreatitis.     ED Course at [facility]  Vitals: 97.9F oral, , /80, satting 96% on RA, RR 16  Labs: notable for Hgb 10.9, .4, K 2.9, HCO3 9, Mag 1.4, calcium 8.3 -> 6.7, T. bili 1.3 (direct 0.5), anion gap 35, AST 83/ALT 46, eGFR 52, lipase > 1500, lactate 5.8 -> 1.4, ITALO 41, trop I negative; UA notable for >80 ketones, protein 30, mod blood, glucose 500   Imaging: CT abd pelvis w/ IV - Interstitial edematous pancreatitis. Severe hepatic steatosis.  Management: CTX 1g, zosyn 3.375g x1, tylenol 650mg rectal x1, ativan 1mg x1, ativan 2mg x1, reglan 10mg IVP x1, zofran 4mg IVP x1, pantoprazole 80mg IVP x1, Mag 2g, KCl 10mEq x3, thiamine 100 mg IVP x1, 2L LR bolus + 2L D5NS

## 2021-07-01 ENCOUNTER — TRANSCRIPTION ENCOUNTER (OUTPATIENT)
Age: 46
End: 2021-07-01

## 2021-07-01 DIAGNOSIS — R94.31 ABNORMAL ELECTROCARDIOGRAM [ECG] [EKG]: ICD-10-CM

## 2021-07-01 DIAGNOSIS — R50.9 FEVER, UNSPECIFIED: ICD-10-CM

## 2021-07-01 DIAGNOSIS — R00.0 TACHYCARDIA, UNSPECIFIED: ICD-10-CM

## 2021-07-01 DIAGNOSIS — D53.9 NUTRITIONAL ANEMIA, UNSPECIFIED: ICD-10-CM

## 2021-07-01 LAB
ANION GAP SERPL CALC-SCNC: 17 MMOL/L — SIGNIFICANT CHANGE UP (ref 5–17)
ANION GAP SERPL CALC-SCNC: 18 MMOL/L — HIGH (ref 5–17)
BUN SERPL-MCNC: 2 MG/DL — LOW (ref 7–23)
BUN SERPL-MCNC: 2 MG/DL — LOW (ref 7–23)
CALCIUM SERPL-MCNC: 7.1 MG/DL — LOW (ref 8.4–10.5)
CALCIUM SERPL-MCNC: 7.6 MG/DL — LOW (ref 8.4–10.5)
CHLORIDE SERPL-SCNC: 94 MMOL/L — LOW (ref 96–108)
CHLORIDE SERPL-SCNC: 94 MMOL/L — LOW (ref 96–108)
CO2 SERPL-SCNC: 23 MMOL/L — SIGNIFICANT CHANGE UP (ref 22–31)
CO2 SERPL-SCNC: 24 MMOL/L — SIGNIFICANT CHANGE UP (ref 22–31)
COVID-19 SPIKE DOMAIN AB INTERP: NEGATIVE — SIGNIFICANT CHANGE UP
COVID-19 SPIKE DOMAIN ANTIBODY RESULT: 0.4 U/ML — SIGNIFICANT CHANGE UP
CREAT SERPL-MCNC: 0.42 MG/DL — LOW (ref 0.5–1.3)
CREAT SERPL-MCNC: 0.43 MG/DL — LOW (ref 0.5–1.3)
CULTURE RESULTS: NO GROWTH — SIGNIFICANT CHANGE UP
GLUCOSE SERPL-MCNC: 114 MG/DL — HIGH (ref 70–99)
GLUCOSE SERPL-MCNC: 93 MG/DL — SIGNIFICANT CHANGE UP (ref 70–99)
HCT VFR BLD CALC: 29 % — LOW (ref 34.5–45)
HGB BLD-MCNC: 9.6 G/DL — LOW (ref 11.5–15.5)
MAGNESIUM SERPL-MCNC: 1.9 MG/DL — SIGNIFICANT CHANGE UP (ref 1.6–2.6)
MAGNESIUM SERPL-MCNC: 2.4 MG/DL — SIGNIFICANT CHANGE UP (ref 1.6–2.6)
MCHC RBC-ENTMCNC: 32 PG — SIGNIFICANT CHANGE UP (ref 27–34)
MCHC RBC-ENTMCNC: 33.1 GM/DL — SIGNIFICANT CHANGE UP (ref 32–36)
MCV RBC AUTO: 96.7 FL — SIGNIFICANT CHANGE UP (ref 80–100)
NRBC # BLD: 0 /100 WBCS — SIGNIFICANT CHANGE UP (ref 0–0)
PHOSPHATE SERPL-MCNC: 1.5 MG/DL — LOW (ref 2.5–4.5)
PHOSPHATE SERPL-MCNC: 2.5 MG/DL — SIGNIFICANT CHANGE UP (ref 2.5–4.5)
PLATELET # BLD AUTO: 129 K/UL — LOW (ref 150–400)
POTASSIUM SERPL-MCNC: 3.4 MMOL/L — LOW (ref 3.5–5.3)
POTASSIUM SERPL-MCNC: 3.5 MMOL/L — SIGNIFICANT CHANGE UP (ref 3.5–5.3)
POTASSIUM SERPL-SCNC: 3.4 MMOL/L — LOW (ref 3.5–5.3)
POTASSIUM SERPL-SCNC: 3.5 MMOL/L — SIGNIFICANT CHANGE UP (ref 3.5–5.3)
RBC # BLD: 3 M/UL — LOW (ref 3.8–5.2)
RBC # FLD: 18.3 % — HIGH (ref 10.3–14.5)
SARS-COV-2 IGG+IGM SERPL QL IA: 0.4 U/ML — SIGNIFICANT CHANGE UP
SARS-COV-2 IGG+IGM SERPL QL IA: NEGATIVE — SIGNIFICANT CHANGE UP
SODIUM SERPL-SCNC: 135 MMOL/L — SIGNIFICANT CHANGE UP (ref 135–145)
SODIUM SERPL-SCNC: 135 MMOL/L — SIGNIFICANT CHANGE UP (ref 135–145)
SPECIMEN SOURCE: SIGNIFICANT CHANGE UP
WBC # BLD: 4.45 K/UL — SIGNIFICANT CHANGE UP (ref 3.8–10.5)
WBC # FLD AUTO: 4.45 K/UL — SIGNIFICANT CHANGE UP (ref 3.8–10.5)

## 2021-07-01 PROCEDURE — 99233 SBSQ HOSP IP/OBS HIGH 50: CPT | Mod: GC

## 2021-07-01 PROCEDURE — 93010 ELECTROCARDIOGRAM REPORT: CPT

## 2021-07-01 PROCEDURE — 99232 SBSQ HOSP IP/OBS MODERATE 35: CPT

## 2021-07-01 RX ORDER — ACETAMINOPHEN 500 MG
650 TABLET ORAL EVERY 6 HOURS
Refills: 0 | Status: DISCONTINUED | OUTPATIENT
Start: 2021-07-01 | End: 2021-07-02

## 2021-07-01 RX ORDER — POTASSIUM CHLORIDE 20 MEQ
40 PACKET (EA) ORAL ONCE
Refills: 0 | Status: COMPLETED | OUTPATIENT
Start: 2021-07-01 | End: 2021-07-01

## 2021-07-01 RX ORDER — POTASSIUM PHOSPHATE, MONOBASIC POTASSIUM PHOSPHATE, DIBASIC 236; 224 MG/ML; MG/ML
30 INJECTION, SOLUTION INTRAVENOUS ONCE
Refills: 0 | Status: COMPLETED | OUTPATIENT
Start: 2021-07-01 | End: 2021-07-01

## 2021-07-01 RX ORDER — PANTOPRAZOLE SODIUM 20 MG/1
1 TABLET, DELAYED RELEASE ORAL
Qty: 60 | Refills: 0
Start: 2021-07-01 | End: 2021-07-31

## 2021-07-01 RX ORDER — PANTOPRAZOLE SODIUM 20 MG/1
1 TABLET, DELAYED RELEASE ORAL
Qty: 60 | Refills: 0
Start: 2021-07-01 | End: 2021-07-30

## 2021-07-01 RX ORDER — MAGNESIUM SULFATE 500 MG/ML
1 VIAL (ML) INJECTION ONCE
Refills: 0 | Status: COMPLETED | OUTPATIENT
Start: 2021-07-01 | End: 2021-07-01

## 2021-07-01 RX ORDER — SODIUM CHLORIDE 9 MG/ML
1000 INJECTION, SOLUTION INTRAVENOUS
Refills: 0 | Status: DISCONTINUED | OUTPATIENT
Start: 2021-07-01 | End: 2021-07-02

## 2021-07-01 RX ORDER — FOLIC ACID 0.8 MG
1 TABLET ORAL
Qty: 30 | Refills: 0
Start: 2021-07-01 | End: 2021-07-31

## 2021-07-01 RX ORDER — ENOXAPARIN SODIUM 100 MG/ML
40 INJECTION SUBCUTANEOUS EVERY 24 HOURS
Refills: 0 | Status: DISCONTINUED | OUTPATIENT
Start: 2021-07-01 | End: 2021-07-02

## 2021-07-01 RX ORDER — THIAMINE MONONITRATE (VIT B1) 100 MG
1 TABLET ORAL
Qty: 30 | Refills: 0
Start: 2021-07-01 | End: 2021-07-30

## 2021-07-01 RX ORDER — THIAMINE MONONITRATE (VIT B1) 100 MG
1 TABLET ORAL
Qty: 30 | Refills: 0
Start: 2021-07-01 | End: 2021-07-31

## 2021-07-01 RX ORDER — POTASSIUM CHLORIDE 20 MEQ
40 PACKET (EA) ORAL ONCE
Refills: 0 | Status: DISCONTINUED | OUTPATIENT
Start: 2021-07-01 | End: 2021-07-01

## 2021-07-01 RX ORDER — POTASSIUM CHLORIDE 20 MEQ
40 PACKET (EA) ORAL EVERY 4 HOURS
Refills: 0 | Status: COMPLETED | OUTPATIENT
Start: 2021-07-01 | End: 2021-07-01

## 2021-07-01 RX ORDER — FOLIC ACID 0.8 MG
1 TABLET ORAL
Qty: 30 | Refills: 0
Start: 2021-07-01 | End: 2021-07-30

## 2021-07-01 RX ADMIN — Medication 100 MILLIEQUIVALENT(S): at 02:35

## 2021-07-01 RX ADMIN — Medication 650 MILLIGRAM(S): at 16:06

## 2021-07-01 RX ADMIN — Medication 1 GRAM(S): at 07:01

## 2021-07-01 RX ADMIN — PANTOPRAZOLE SODIUM 40 MILLIGRAM(S): 20 TABLET, DELAYED RELEASE ORAL at 07:01

## 2021-07-01 RX ADMIN — Medication 25 MILLIGRAM(S): at 16:06

## 2021-07-01 RX ADMIN — Medication 650 MILLIGRAM(S): at 17:02

## 2021-07-01 RX ADMIN — Medication 100 GRAM(S): at 17:39

## 2021-07-01 RX ADMIN — Medication 40 MILLIEQUIVALENT(S): at 02:36

## 2021-07-01 RX ADMIN — POTASSIUM PHOSPHATE, MONOBASIC POTASSIUM PHOSPHATE, DIBASIC 83.33 MILLIMOLE(S): 236; 224 INJECTION, SOLUTION INTRAVENOUS at 17:39

## 2021-07-01 RX ADMIN — Medication 40 MILLIEQUIVALENT(S): at 09:08

## 2021-07-01 RX ADMIN — Medication 100 MILLIEQUIVALENT(S): at 04:00

## 2021-07-01 RX ADMIN — Medication 100 MILLIEQUIVALENT(S): at 03:00

## 2021-07-01 RX ADMIN — Medication 1 GRAM(S): at 11:11

## 2021-07-01 RX ADMIN — Medication 100 GRAM(S): at 01:14

## 2021-07-01 RX ADMIN — SODIUM CHLORIDE 150 MILLILITER(S): 9 INJECTION, SOLUTION INTRAVENOUS at 19:41

## 2021-07-01 RX ADMIN — Medication 1 MILLIGRAM(S): at 11:11

## 2021-07-01 RX ADMIN — PANTOPRAZOLE SODIUM 40 MILLIGRAM(S): 20 TABLET, DELAYED RELEASE ORAL at 17:02

## 2021-07-01 RX ADMIN — POTASSIUM PHOSPHATE, MONOBASIC POTASSIUM PHOSPHATE, DIBASIC 83.33 MILLIMOLE(S): 236; 224 INJECTION, SOLUTION INTRAVENOUS at 01:16

## 2021-07-01 RX ADMIN — Medication 1 GRAM(S): at 17:02

## 2021-07-01 RX ADMIN — Medication 25 MILLIGRAM(S): at 21:39

## 2021-07-01 RX ADMIN — Medication 40 MILLIEQUIVALENT(S): at 13:15

## 2021-07-01 RX ADMIN — Medication 0.5 MILLIGRAM(S): at 16:05

## 2021-07-01 RX ADMIN — Medication 40 MILLIEQUIVALENT(S): at 01:33

## 2021-07-01 RX ADMIN — Medication 1 TABLET(S): at 07:01

## 2021-07-01 RX ADMIN — Medication 1 GRAM(S): at 01:33

## 2021-07-01 RX ADMIN — Medication 1 GRAM(S): at 23:49

## 2021-07-01 RX ADMIN — ENOXAPARIN SODIUM 40 MILLIGRAM(S): 100 INJECTION SUBCUTANEOUS at 09:08

## 2021-07-01 RX ADMIN — Medication 100 MILLIGRAM(S): at 07:01

## 2021-07-01 NOTE — PROGRESS NOTE ADULT - ASSESSMENT
45 y/o Female with PMHx chronic alcoholism, anxiety, depression who presents with N/V for 4 days with hematemesis (described as coffee ground emesis) yesterday, most recent emesis 6/29 at 8:30pm, also p/w tremors 2/2 alcohol abuse and abdominal pain, admitted for alcohol withdrawal along with pancreatitis in setting of lipase >1500 and CT imaging consistent with diagnosis.

## 2021-07-01 NOTE — PROGRESS NOTE ADULT - PROBLEM SELECTOR PLAN 6
Found to have hgb 10.9 with .4. Patient with small bouts of emesis yesterday, but none since. Also denies any BRBPR or melena. Likely secondary to chronic alcohol consumption.   - f/u iron panel  - maintain active T&S  - transfuse for Hgb < 7  -B12 and folate levels pending On admission found to have Anion gap 35 -> 23, lactate 5.8 -> 1.4 and sodium bicarb 9. UA also notable for ketones.   - BHB of 4.6 noted   -ABG showed pH of 7.41, pCO2 30, HCO3 19 on admission  -elevated blood glucose of 416 -->140  - s/p 2L LR and D5NS 2L in the ED  - likely secondary to starvation ketosis, as patient has not been tolerating PO intake for the past few days  -repeat AG of 17  - f/u repeat BMP to monitor anion gap <RESOLVED>  On admission found to have Anion gap 35 -> 23, lactate 5.8 -> 1.4 and sodium bicarb 9. UA also notable for ketones.   - BHB of 4.6 noted   -ABG showed pH of 7.41, pCO2 30, HCO3 19 on admission  -elevated blood glucose of 416 -->140  - s/p 2L LR and D5NS 2L in the ED  - likely secondary to starvation ketosis, as patient had not been tolerating PO intake for the past few days

## 2021-07-01 NOTE — DISCHARGE NOTE PROVIDER - NSDCCPCAREPLAN_GEN_ALL_CORE_FT
PRINCIPAL DISCHARGE DIAGNOSIS  Diagnosis: Acute pancreatitis  Assessment and Plan of Treatment: When you were admitted to the hospital, a marker in your blood was elevated, which told us you have pancreatitis. We also saw inflammation of your pancreas on your CT scan. Pancreatitis is when the pancreatic enzymes are digesting the pancreas itself. To treat this, we gave you IV fluids and pain medication. Please follow up with your primary care doctor within two weeks after discharge.      SECONDARY DISCHARGE DIAGNOSES  Diagnosis: Alcohol withdrawal  Assessment and Plan of Treatment: When you were admitted, we were monitoring your symptoms of alcohol withdrawal, to determine if you needed IV medication. You remained stable during your hospital.    Diagnosis: Anemia  Assessment and Plan of Treatment: Your blood levels were mildly low when you were admitted, but improved over the course of a few days. A GI doctor evaluated you in the hospital and did not believe you needed any further intervention at this time and recommended anti-acid medication. Please follow up with your primary care doctor.    Diagnosis: Transaminitis  Assessment and Plan of Treatment: Your liver enzymes were elevated when you were admitted to the hospital. We believe this was due to alcohol use. We are checking your Hepatitis panel, to see if there are any additional causes for your elevated liver enzymes. Please follow up with your primary care doctor for the results.    Diagnosis: Intractable vomiting  Assessment and Plan of Treatment: Before you came to the Emergency Department, you said you were vomiting for multiple days. In the hospital, you were given anti-nausea medication which helped resolve your symptoms. We also are giving you medication to help with your acid production, as you had coffee ground vomiting during your stay. Please follow up with  your primary care doctor.     PRINCIPAL DISCHARGE DIAGNOSIS  Diagnosis: Acute pancreatitis  Assessment and Plan of Treatment: When you were admitted to the hospital, a marker in your blood was elevated, which told us you have pancreatitis. We also saw inflammation of your pancreas on your CT scan. Pancreatitis is when the pancreatic enzymes are digesting the pancreas itself. To treat this, we gave you IV fluids and pain medication. We believe this was secondary to your alcohol use. We recommend you try to reduce your alcohol consumption. Please follow up with your primary care doctor within two weeks after discharge.      SECONDARY DISCHARGE DIAGNOSES  Diagnosis: Alcohol withdrawal  Assessment and Plan of Treatment: When you were admitted, we were monitoring your symptoms of alcohol withdrawal, to determine if you needed IV medication. You remained stable during your hospital. We prescribed you multivitamins, folate pills and thiamine pills. We recommend you continue to take these medications when discharged.    Diagnosis: Anemia  Assessment and Plan of Treatment: Your blood levels were mildly low when you were admitted, but improved over the course of a few days. A GI doctor evaluated you in the hospital and did not believe you needed any further intervention at this time and recommended anti-acid medication. Please follow up with your primary care doctor.    Diagnosis: Transaminitis  Assessment and Plan of Treatment: Your liver enzymes were elevated when you were admitted to the hospital. We believe this was due to alcohol use. We are checking your Hepatitis panel, to see if there are any additional causes for your elevated liver enzymes. Please follow up with your primary care doctor for the results.    Diagnosis: Intractable vomiting  Assessment and Plan of Treatment: Before you came to the Emergency Department, you said you were vomiting for multiple days. In the hospital, you were given anti-nausea medication which helped resolve your symptoms. We also are giving you medication to help with your acid production, as you had coffee ground vomiting during your stay. Please follow up with  your primary care doctor.     PRINCIPAL DISCHARGE DIAGNOSIS  Diagnosis: Acute pancreatitis  Assessment and Plan of Treatment: You came into the hospital with abdominal pain, nausea, and vomiting. A marker in your blood was elevated, which told us you have pancreatitis. We also saw inflammation of your pancreas on your CT scan. Pancreatitis is when the pancreatic enzymes are digesting the pancreas itself. To treat this, we gave you IV fluids and pain medication. We believe this was secondary to your alcohol use. We recommend you try to reduce your alcohol consumption. Please follow up with your primary care doctor within two weeks after discharge.  If you continue to have abdominal pain, diarrhea, fevers, lightheadedness, this could be a sign that you are having a complication of your pancreatitis. If this happens, please return to the emergency room.      SECONDARY DISCHARGE DIAGNOSES  Diagnosis: Intractable vomiting  Assessment and Plan of Treatment: Before you came to the Emergency Department, you said you were vomiting for multiple days. In the hospital, you were given anti-nausea medication which helped resolve your symptoms. We also are giving you medication to help with your acid production, as you had coffee ground vomiting during your stay. Please follow up with  your primary care doctor.    Diagnosis: Anemia  Assessment and Plan of Treatment: Your blood levels were mildly low when you were admitted, but improved over the course of a few days. We were initially concerned that you might be vomiting blood because you said you had coffee grounds in your vomit. However, our stomach doctor evaluated you in the hospital and did not believe you needed any further intervention at this time and recommended anti-acid medication. Please continue to take this medication and follow up with your primary care doctor.    Diagnosis: Transaminitis  Assessment and Plan of Treatment: Your liver enzymes were elevated when you were admitted to the hospital. We believe this was due to alcohol use. Please decrease your alcohol use and follow up with your primary care doctor to monitor your liver function.    Diagnosis: Alcohol withdrawal  Assessment and Plan of Treatment: When you were admitted, we were monitoring your symptoms of alcohol withdrawal, to determine if you needed IV medication. You remained stable during your hospital. We prescribed you multivitamins, folate pills, and thiamine pills as alcohol can cause nutritional deficiencies. We recommend you continue to take these medications when discharged.

## 2021-07-01 NOTE — PROGRESS NOTE ADULT - PROBLEM SELECTOR PLAN 7
Mild transaminitis, AST 83/ALT 46. Likely secondary to chronic alcohol use  -Hepatitis panel pending  -CTAP showed Severe hepatic steatosis. Smooth hepatic contour Found to have hgb 10.9 with .4. Patient with small bouts of emesis yesterday, but none since. Also denies any BRBPR or melena. Likely secondary to chronic alcohol consumption.   - f/u iron panel  - maintain active T&S  - transfuse for Hgb < 7  -B12 and folate levels pending Found to have hgb 10.9 with .4. Patient with small bouts of coffee ground emesis prior to admission, but none since. Also denies any BRBPR or melena. Likely secondary to chronic alcohol consumption.   - maintain active T&S  - transfuse for Hgb < 7  -B12 and folate levels pending

## 2021-07-01 NOTE — PROGRESS NOTE ADULT - PROBLEM SELECTOR PLAN 8
F: tolerating PO  E: replete K<4, Mg<2. Phosphate of 0.4 today, repleted and repeat labs pending  N: NPO for now  VTE Prophylaxis: none (IMPROVE score 0)  GI: not needed  C: Full Code  Dispo: [id number] Mild transaminitis, AST 83/ALT 46. Likely secondary to chronic alcohol use  -Hepatitis panel pending  -CTAP showed Severe hepatic steatosis. Smooth hepatic contour Mild transaminitis, AST 83/ALT 46. Likely secondary to chronic alcohol use  -Hepatitis panel negative  -CTAP showed Severe hepatic steatosis. Smooth hepatic contour

## 2021-07-01 NOTE — PROGRESS NOTE ADULT - SUBJECTIVE AND OBJECTIVE BOX
HOSPITAL COURSE:   Ms. Vivar is a 46 year old woman with PMHx chronic alcoholism, anxiety, OCD depression presenting with nausea and vomiting roughly every 2 hours since Saturday. She has a history of severe alcohol abuse for which she drinks 0.5-1.0 L of Vodka per day. During this time span she has been unable to keep up with PO liquids or foods, no food since saturday (). Reports to vomiting some blood yesterday (), described as coffee ground emesis but none today, most recent episode of emesis at 8:30 pm , still with residual nausea. Patient also reports sensation of something stuck in the back of her throat.  Endorses intermittent abdominal pain, can radiate to her back. Denies diarrhea and ROS otherwise negative. According to the  while at [facility], she has undergone alcohol detoxication many times in the past, but never went to rehab. The patient denies history of alcohol withdrawal related seizures or ICU stays. Also denies history of pancreatitis. In the ED, lipase >1500, K of 2.9, Mg of 1.4, lactate of 5.8 (improved to 1.4) and anion gap of 35 (improved to 19). CT AP consistent with pancreatitis. Patient started on IVF and received one dose of Vanc and Zosyn in the ED. BC and UC pending. Started on CIWA protocol. GI consulted, regarding coffee ground emesis and mild drop of Hgb (10.9-->9.3). No EGD needed at this time and Hb stable on repeat without further emesis, recommended to continue with PPI BID. Macrocytic anemia noted, likely secondary to chronic alcohol use, B12 and folate pending. Phosphorus requiring continuous repletion. AST 83/ALT 46 with hepatitis panel negative. CT AP showed Severe hepatic steatosis. Smooth hepatic contour. Overnight  pt with temperature 100.6 without localizing symptoms. On , pt spiked fever 100.5 with tachycardia to 120s. Given ativan .5mg IV and started on librium due to concern for withdrawal due to tremor and baseline anxiety. Febrile workup was not done due to low suspicion of localizing infection and was attributed to EtOH withdrawal. Stable for transfer to [facility].    SUBJECTIVE:  Ms. Vivar states that she feels much better and has been able to ambulate without any issues. She has been able to eat solid food and is awaiting dinner since arriving on the floor. She denies any hallucinations, tremulousness. She states she has anxiety and OCD which is heightened since being hospitalized.     Vital Signs Last 24 Hrs  T(C): 36.9 (2021 17:30), Max: 38.1 (2021 21:06)  T(F): 98.5 (2021 17:30), Max: 100.6 (2021 21:06)  HR: 100 (2021 18:19) (94 - 122)  BP: 115/74 (2021 18:19) (110/75 - 134/74)  BP(mean): 90 (2021 18:19) (86 - 104)  RR: 18 (2021 15:49) (18 - 18)  SpO2: 96% (2021 18:19) (90% - 98%)       @ 07: @ 07:00  --------------------------------------------------------  IN: 1200 mL / OUT: 3801 mL / NET: -2601 mL     @ 07: @ 19:06  --------------------------------------------------------  IN: 0 mL / OUT: 600 mL / NET: -600 mL          PHYSICAL EXAM:  Constitutional: anxious-appearing, sitting up in bed  HEENT: Dry mucous membranes, NCAT, tongue fasciculations present  Neck: Supple, no JVD  Respiratory: CTA B/L. No w/r/r.   Cardiovascular: Regular rhythm, tachycardic. normal S1 and S2, no m/r/g.   Gastrointestinal: +BS, soft NTND, no guarding or rebound tenderness, no palpable masses   Extremities: wwp; no cyanosis, clubbing or edema.   Vascular: Pulses equal and strong throughout.   Neurological: AAOx3, no CN deficits, strength and sensation intact throughout.   Skin: No gross skin abnormalities or rashes        LABS:                         9.6    4.45  )-----------( 129      ( 2021 06:39 )             29.0     07-01    135  |  94<L>  |  2<L>  ----------------------------<  114<H>  3.5   |  23  |  0.42<L>    Ca    7.6<L>      2021 15:54  Phos  1.5     07-  Mg     1.9     07-    TPro  6.5  /  Alb  3.2<L>  /  TBili  1.1  /  DBili  x   /  AST  65<H>  /  ALT  33  /  AlkPhos  43  06-30    PT/INR - ( 2021 22:13 )   PT: 13.0 sec;   INR: 1.11          PTT - ( 2021 22:13 )  PTT:25.4 sec  Urinalysis Basic - ( 2021 01:53 )    Color: Yellow / Appearance: Clear / S.020 / pH: x  Gluc: x / Ketone: >=80 mg/dL  / Bili: NEGATIVE / Urobili: 0.2 E.U./dL   Blood: x / Protein: 30 mg/dL / Nitrite: NEGATIVE   Leuk Esterase: NEGATIVE / RBC: < 5 /HPF / WBC < 5 /HPF   Sq Epi: x / Non Sq Epi: x / Bacteria: x      CARDIAC MARKERS ( 2021 21:25 )  <0.017 ng/mL / x     / x     / x     / x                RADIOLOGY, EKG & ADDITIONAL TESTS: Reviewed.           RADIOLOGY & ADDITIONAL TESTS:    MEDICATIONS  (STANDING):  folic acid 1 milliGRAM(s) Oral daily  lactated ringers. 1000 milliLiter(s) (200 mL/Hr) IV Continuous <Continuous>  multivitamin 1 Tablet(s) Oral every 24 hours  pantoprazole    Tablet 40 milliGRAM(s) Oral every 12 hours  potassium chloride   Powder 40 milliEquivalent(s) Oral once  sucralfate suspension 1 Gram(s) Oral four times a day  thiamine 100 milliGRAM(s) Oral every 24 hours    MEDICATIONS  (PRN):  LORazepam   Injectable 2 milliGRAM(s) IV Push every 1 hour PRN HELLEN-Ar score 8 or greater  ondansetron Injectable 4 milliGRAM(s) IV Push every 6 hours PRN Nausea and/or Vomiting

## 2021-07-01 NOTE — PROGRESS NOTE ADULT - SUBJECTIVE AND OBJECTIVE BOX
GASTROENTEROLOGY PROGRESS NOTE  Patient seen and examined at bedside. Tolerating regular diet without nausea or vomiting. hgb 9.6 today from 9.4    PERTINENT REVIEW OF SYSTEMS:  CONSTITUTIONAL: No weakness, fevers or chills  HEENT: No visual changes; No vertigo or throat pain   GASTROINTESTINAL: As above.  NEUROLOGICAL: No numbness or weakness  SKIN: No itching, burning, rashes, or lesions     Allergies    No Known Allergies    Intolerances      MEDICATIONS:  MEDICATIONS  (STANDING):  enoxaparin Injectable 40 milliGRAM(s) SubCutaneous every 24 hours  folic acid 1 milliGRAM(s) Oral daily  multivitamin 1 Tablet(s) Oral every 24 hours  pantoprazole    Tablet 40 milliGRAM(s) Oral every 12 hours  sucralfate suspension 1 Gram(s) Oral four times a day  thiamine 100 milliGRAM(s) Oral every 24 hours    MEDICATIONS  (PRN):  LORazepam   Injectable 2 milliGRAM(s) IV Push every 1 hour PRN CIWA-Ar score 8 or greater    Vital Signs Last 24 Hrs  T(C): 36.9 (2021 12:33), Max: 38.1 (2021 21:06)  T(F): 98.4 (2021 12:33), Max: 100.6 (2021 21:06)  HR: 122 (2021 12:33) (94 - 122)  BP: 110/75 (2021 12:15) (110/75 - 141/87)  BP(mean): 86 (2021 12:15) (86 - 110)  RR: 18 (2021 12:15) (18 - 18)  SpO2: 90% (2021 12:33) (90% - 98%)     @ 07:  -   @ 07:00  --------------------------------------------------------  IN: 1200 mL / OUT: 3801 mL / NET: -2601 mL      PHYSICAL EXAM:    General: in no acute distress  HEENT: MMM, conjunctiva and sclera clear  Gastrointestinal: Soft non-tender non-distended; No rebound or guarding  Skin: Warm and dry. No obvious rash    LABS:                        9.6    4.45  )-----------( 129      ( 2021 06:39 )             29.0     07-    135  |  94<L>  |  2<L>  ----------------------------<  93  3.4<L>   |  24  |  0.43<L>    Ca    7.1<L>      2021 06:39  Phos  2.5     07-  Mg     2.4     07-    TPro  6.5  /  Alb  3.2<L>  /  TBili  1.1  /  DBili  x   /  AST  65<H>  /  ALT  33  /  AlkPhos  43  06-30    PT/INR - ( 2021 22:13 )   PT: 13.0 sec;   INR: 1.11          PTT - ( 2021 22:13 )  PTT:25.4 sec      Urinalysis Basic - ( 2021 01:53 )    Color: Yellow / Appearance: Clear / S.020 / pH: x  Gluc: x / Ketone: >=80 mg/dL  / Bili: NEGATIVE / Urobili: 0.2 E.U./dL   Blood: x / Protein: 30 mg/dL / Nitrite: NEGATIVE   Leuk Esterase: NEGATIVE / RBC: < 5 /HPF / WBC < 5 /HPF   Sq Epi: x / Non Sq Epi: x / Bacteria: x                Culture - Urine (collected 2021 06:26)  Source: .Urine Clean Catch (Midstream)  Final Report (2021 03:28):    No growth    Culture - Blood (collected 2021 02:33)  Source: .Blood Blood-Peripheral  Preliminary Report (2021 03:02):    No growth to date.    Culture - Blood (collected 2021 02:33)  Source: .Blood Blood-Peripheral  Preliminary Report (2021 03:02):    No growth to date.      RADIOLOGY & ADDITIONAL STUDIES:  Reviewed

## 2021-07-01 NOTE — PROGRESS NOTE ADULT - PROBLEM SELECTOR PLAN 9
F: tolerating PO  E: replete K<4, Mg<2. Phosphate of 0.4 today, repleted and repeat labs pending  N: NPO for now  VTE Prophylaxis: none (IMPROVE score 0)  GI: not needed  C: Full Code  Dispo: [place] F: tolerating PO  E: replete prn  N: regular  VTE Prophylaxis: lovenox  GI: not needed  C: Full Code  [facility]

## 2021-07-01 NOTE — DISCHARGE NOTE PROVIDER - NSDCMRMEDTOKEN_GEN_ALL_CORE_FT
metoprolol 25 mg oral tablet: 1 tab(s) orally once a day PRN palpitations  ondansetron 4 mg oral tablet: 1 tab(s) orally every 8 hours    folic acid 1 mg oral tablet: 1 tab(s) orally once a day  metoprolol 25 mg oral tablet: 1 tab(s) orally once a day PRN palpitations  Multiple Vitamins oral tablet: 1 tab(s) orally every 24 hours  pantoprazole 40 mg oral delayed release tablet: 1 tab(s) orally every 12 hours  thiamine 100 mg oral tablet: 1 tab(s) orally every 24 hours   folic acid 1 mg oral tablet: 1 tab(s) orally once a day  Multiple Vitamins oral tablet: 1 tab(s) orally every 24 hours  pantoprazole 40 mg oral delayed release tablet: 1 tab(s) orally every 12 hours  thiamine 100 mg oral tablet: 1 tab(s) orally every 24 hours

## 2021-07-01 NOTE — PROGRESS NOTE ADULT - PROBLEM SELECTOR PLAN 3
-patient reported a few episodes of coffee ground emesis on 6/29  -Patient reports a history of rectal bleeding in January 2021 and went to [facility]. EGD was planned at that time, but the patient left AMA  -Hgb of 10.9 in the ED, went down to 9.3. May be due to IVF, but will trend CBCs  -GI consulted to determine of EGD is recommended at this time  -Patient started on Protonix BID -patient reported a few episodes of coffee ground emesis on 6/29  -Patient reports a history of rectal bleeding in January 2021 and went to [facility]. EGD was planned at that time, but the patient left AMA  -Hgb of 10.9 in the ED, went down to 9.3 and improved to 9.6 today  -May be due to IVF, but will trend CBCs  -GI consulted, recommendations appreciated. No need for EGD at this time  -Continue Protonix BID and Carafate 1gm QID

## 2021-07-01 NOTE — PROGRESS NOTE ADULT - PROBLEM SELECTOR PLAN 4
Persistently elevated in 110s, likely multifactorial and can be attributed to dehydration, EtOH, and elevated cytokine activity 2/2 pancreatitis  - LR @ 150 mL/hour as above  - CIWA protocol as above  - Patient states she is exceptionally thirsty with dry MM on exam

## 2021-07-01 NOTE — PROGRESS NOTE ADULT - PROBLEM SELECTOR PLAN 10
F: LR @ 150 mL/hour  E: replete prn  N: regular    VTE Prophylaxis: lovenox  GI: Protonix and Carafate due to hematemsis  C: Full Code  Dispo: Pending Librium taper and resolution of tachycardia

## 2021-07-01 NOTE — PROGRESS NOTE ADULT - ATTENDING COMMENTS
reviewed pertinent data , chart note  patient seen and examined overnight     PE as above w/ following exceptions/ additions:       1.        rest of  plan as above reviewed pertinent data , chart note  patient seen and examined overnight   reviewed PMH, SHX, FH (cardiac disease in mother and father)  pt reports increasing ETOH use in the last 2 years 2/2 stressors from brother, father and mother passing away in succession. pt reports being perimenopausal      PE as above w/ following exceptions/ additions: mild diaphoresis, chronically ill appearing, thin, no hand tremors b/l, mild tongue fasciculation noted, abdomen soft/nt/nd, no lower ext edema /tenderness b/l       1. ETOH w/d: monitor ciwa, c/w librium taper, ativan prn  2. pancreatitis is resolving , ADAT, monitor for recurrent pain, if becomes febrile again, please pan culture and may need repeat CT a/p  3. coffee ground emesis: monitor h/h, on carafate and ppi  4. prolonged QTC: repeat read as 580ms, cardiology to consult on patient o/n. followup recs, if stable for floor will repeat EKG in AM ; K and Mg repleted; Phos being repleted.      rest of  plan as above

## 2021-07-01 NOTE — PROGRESS NOTE ADULT - PROBLEM SELECTOR PLAN 7
Found to have hgb 10.9 with .4. Patient with small bouts of coffee ground emesis prior to admission, but none since. Also denies any BRBPR or melena. Likely secondary to chronic alcohol consumption.   - maintain active T&S  - transfuse for Hgb < 7  -B12 and folate levels pending

## 2021-07-01 NOTE — PROGRESS NOTE ADULT - ASSESSMENT
45 y/o Female with PMHx chronic alcoholism (drink  1/4 to 3/4 bottle per day), anxiety, depression presenting with nausea and vomiting roughly every 2 hours since Saturday. GI was consulted as pt had coffee ground (brown liquid) emesis on 6/29, now vomiting resolved with stable hgb. Given patient is withdrawing she would not be a good candidate for endoscopy. Cause of coffee ground emesis likely secondary to danny langley tears vs erosive esophagitis.     #Coffee ground emesis  - Monitor hgb inpatient- stable today at 9.6; will need repeat cbc as outpatient  - Continue IV PPI BID while inpatient; can discharge on 2-3 months PPI qdaily  - Continue carafate suspension 1g QID while inpatient; can d/c on discharge  - Alcohol cessation counseling provided, appreciate social work assistance  - folic acid, MVI, thiamine  - advance diet as tolerated  - Maintain active T&S, large bore IV access    Thank you for allowing us to participate in the care of this patient.  GI will sign off. Please call back with any questions or concerns.     Do Ac MD  Gastroenterology Fellow PGY-4 45 y/o Female with PMHx chronic alcoholism (drink  1/4 to 3/4 bottle per day), anxiety, depression presenting with nausea and vomiting roughly every 2 hours since Saturday. GI was consulted as pt had coffee ground (brown liquid) emesis on 6/29, now vomiting resolved with stable hgb. Given patient is withdrawing she would not be a good candidate for endoscopy. Cause of coffee ground emesis likely secondary to danny langley tears vs erosive esophagitis.     #Coffee ground emesis  - Monitor hgb inpatient- stable today at 9.6; will need repeat cbc as outpatient  - Continue IV PPI BID while inpatient; can discharge on 2-3 months PPI qdaily  - Continue carafate suspension 1g QID while inpatient; can d/c on discharge  - Alcohol cessation counseling provided, appreciate social work assistance  - folic acid, MVI, thiamine  - advance diet as tolerated  - Maintain active T&S, large bore IV access  - Please schedule patient for follow-up at the GI Fellow's clinic at [address] with myself and Dr. Man at [phone]    Thank you for allowing us to participate in the care of this patient.  GI will sign off. Please call back with any questions or concerns.     Do Ac MD  Gastroenterology Fellow PGY-4

## 2021-07-01 NOTE — PROGRESS NOTE ADULT - PROBLEM SELECTOR PLAN 6
.7 on arrival with Hgb 9.3  - No active signs of bleed and no repeated hematemesis  - Fe studies in AM but likely 2/2 AOCD  - Maintain active T & S

## 2021-07-01 NOTE — PROGRESS NOTE ADULT - PROBLEM SELECTOR PLAN 2
On admission admits to intermittent abdominal pain with radiation to her back. Denies diarrhea and ROS otherwise negative. [facility] labs notable for lipase > 1500 with CT abd pelvis showing interstitial edematous pancreatitis.  - s/p 2L LR bolus + 2L D5NS in [facility]   - starting LR @ 200cc/hr x10 hours  - pain control  - f/u lipid profile  -repeat lipase pending On admission admits to intermittent abdominal pain with radiation to her back. Denies diarrhea and ROS otherwise negative. [facility] labs notable for lipase > 1500 with CT abd pelvis showing interstitial edematous pancreatitis.  - s/[facility]L [facility]   - starting LR @ 200cc/hr x10 hours  - pain control  - f/u lipid profile  -repeat lipase of ~1000

## 2021-07-01 NOTE — PROGRESS NOTE ADULT - PROBLEM SELECTOR PLAN 1
Known history of chronic alcohol consumption. Typically consumes 0.5-1.0 L vodka per day. According to the  while at [facility], she has undergone alcohol detoxication many times in the past, but never went to rehab. Patient found to have ITALO 41 on admission. Last reported drink 5pm yesterday. Reports history of withdrawals, but no seizures/DTs or intubations. Poor historian as unsure of last withdrawal.   - CIWA high risk protocol with PRN ativan   -CIWA of 0 on evaluation today   - starting thiamine/MV/folic acid   - low threshold to start librium taper Known history of chronic alcohol consumption. Typically consumes 0.5-1.0 L vodka per day. According to the  while at [facility], she has undergone alcohol detoxication many times in the past, but never went to rehab. Patient found to have ITALO 41 on admission. Last reported drink 5pm yesterday. Reports history of withdrawals, but no seizures/DTs or intubations. Poor historian as unsure of last withdrawal.   - CIWA high risk protocol with PRN ativan   - Remains CIWA of 0, no further Ativan required  - Continue thiamine/MV/folic acid   - low threshold to start librium taper Known history of chronic alcohol consumption. Typically consumes 0.5-1.0 L vodka per day. According to the  while at [facility], she has undergone alcohol detoxication many times in the past, but never went to rehab. Patient found to have ITALO 41 on admission. Last reported drink 5pm 6/29. Reports history of withdrawals, but no seizures/DTs or intubations. Poor historian as unsure of last withdrawal.   - on 7/1 pt with CIWA 7 however tachycardic to 120s, albeit in the setting of 100.5 fever  - started on librium  Plan:  - cont librium with plan to taper  - CIWA protocol with PRN ativan   - Continue thiamine/MV/folic acid

## 2021-07-01 NOTE — PROGRESS NOTE ADULT - PROBLEM SELECTOR PLAN 8
Mild transaminitis, AST 83/ALT 46. Likely secondary to chronic alcohol use  -Hepatitis panel negative  -CTAP showed Severe hepatic steatosis. Smooth hepatic contour

## 2021-07-01 NOTE — DISCHARGE NOTE PROVIDER - HOSPITAL COURSE
45 y/o Female with PMHx chronic alcoholism, anxiety, depression presenting with nausea and vomiting roughly every 2 hours since Saturday. She has a history of severe alcohol abuse for which she drinks 0.5-1.0 L of Vodka per day. During this time span she has been unable to keep up with PO liquids or foods, no food since saturday (6/26). Reports to vomiting some blood yesterday (6/29), described as coffee ground emesis but none today, most recent episode of emesis at 8:30 pm 6/29, still with residual nausea. Patient also reports sensation of something stuck in the back of her throat.  Endorses intermittent abdominal pain, can radiate to her back. Denies diarrhea and ROS otherwise negative. According to the  while at [facility], she has undergone alcohol detoxication many times in the past, but never went to rehab. The patient denies history of alcohol withdrawal related seizures or ICU stays. Also denies history of pancreatitis. In the ED, lipase >1500, K of 2.9, Mg of 1.4, lactate of 5.8 (improved to 1.4) and anion gap of 35 (improved to 19). CT AP consistent with pancreatitis. Patient started on IVF and received one dose of Vanc and Zosyn in the ED. BC and UC pending. Patient's CIWA remained 0-4 during admission and tremors resolved. GI consulted, regarding coffee ground emesis and mild drop of Hgb (10.9-->9.3). No EGD needed at this time, recommended to continue with PPI BID. Macrocytic anemia noted, likely secondary to chronic alcohol use, B12 and folate pending. Phosphorus of 0.4 on 6/30, repleted and will continue to monitor. Mild transaminitis with AST 83/ALT 46. Hepatitis panel pending. CTAP showed Severe hepatic steatosis. Smooth hepatic contour.     #Discharge: do not delete    Patient is __ yo M/F with past medical history of _____  Presented with _____, found to have _____  Problem List/Main Diagnoses (system-based):   Inpatient treatment course:   New medications:   Labs to be followed outpatient:   Exam to be followed outpatient:        #Discharge: do not delete    Patient is a 45 y/o Female with PMHx chronic alcoholism, anxiety, depression presenting with nausea and vomiting roughly every 2 hours since Saturday. She has a history of severe alcohol abuse for which she drinks 0.5-1.0 L of Vodka per day (no history of alcohol withdrawal related seizures or ICU stays). During this time span she has been unable to keep up with PO liquids or foods, no food since saturday (6/26). Reports to vomiting some blood yesterday (6/29), described as coffee ground emesis but none today, most recent episode of emesis at 8:30 pm 6/29, still with residual nausea. Patient also reports sensation of something stuck in the back of her throat.  Endorses intermittent abdominal pain, can radiate to her back. Patient admitted for alcohol withdrawal and pancreatitis. \    Problem List/Main Diagnoses (system-based):   #Alcohol withdrawal  -Patient reports chronic use of 0.5-1L of vodka daily  -patient denies a history of alcohol withdrawal seizures or ICU stays  -Patient was on PRN Ativan and CIWA remained 0-4  -Tremors resolved over hospital stay. Patient denied visual/auditory/tactile hallucinations  -Patient plans to go to a detox program once finding the best fit for her  -will be discharged home until a Detox program is found    #Pancreatitis  -On admission, lipase >1500 and CT consistent with pancreatitis  -lactate of 5.8, repeat of 1.4  -started on IVF, with repeat lipase ~1000  -patient tolerated PO intake with no further nausea or vomiting  -BC and UC negative    #Intractable vomiting/Coffee ground emesis  -patient reports a few day history of nausea and vomiting with limited PO intake  -patient noticed coffee ground emesis prior to her arrival to the ED  -Hgb of 10.9 on arrival, repeat of 9.3, followed by 9.6  -GI evaluated the patient given minimal drop in Hgb. No need for endoscopy at this time. Recommended IV PPI BID and Carafate    #Mild transaminitis  AST 83/ALT 46  CTAP showed Severe hepatic steatosis. Smooth hepatic contour  Hepatitis panel pending  May be secondary to alcohol use  will f/u    #macrocytic anemia  -likely secondary to alcohol use  -b12 and folate pending, will f/u    #Electrolyte disturbances   K, phos and magnesium repleted during hospital stay    Inpatient treatment course: As above  New medications:   Labs to be followed outpatient: Hepatitis Panel, B12, Folate, CBC, CMP  Exam to be followed outpatient:        #Discharge: do not delete    Patient is a 45 y/o Female with PMHx chronic alcoholism (use of 0.5-1L vodka daily, denies history of withdrawal seizures or ICU admissions), anxiety, depression presenting with nausea and vomiting roughly every 2 hours since Saturday. Patient also reports a one day history of coffee ground emesis. Endorses intermittent abdominal pain, can radiate to her back. Lipase < 1500. CTAP consistent with pancreatitis. CIWA 0. Patient admitted for alcohol withdrawal management and pancreatitis.     Problem List/Main Diagnoses (system-based):   #Alcohol withdrawal  -Patient reports chronic use of 0.5-1L of vodka daily  -patient denies a history of alcohol withdrawal seizures or ICU stays  -Patient was on PRN Ativan and CIWA remained 0-4  -Tremors resolved over hospital stay. Patient denied visual/auditory/tactile hallucinations  -Patient plans to go to a detox program once finding the best fit for her  -will be discharged home until a Detox program is found    #Pancreatitis  -On admission, lipase >1500 and CT consistent with pancreatitis  -lactate of 5.8, repeat of 1.4  -started on IVF, with repeat lipase ~1000  -patient tolerated PO intake with no further nausea or vomiting  -BC and UC negative    #Intractable vomiting/Coffee ground emesis  -patient reports a few day history of nausea and vomiting with limited PO intake  -patient noticed coffee ground emesis prior to her arrival to the ED  -Hgb of 10.9 on arrival, repeat of 9.3, followed by 9.6  -GI evaluated the patient given minimal drop in Hgb. No need for endoscopy at this time. Recommended IV PPI BID and Carafate    #Mild transaminitis  AST 83/ALT 46  CTAP showed Severe hepatic steatosis. Smooth hepatic contour  Hepatitis panel pending  May be secondary to alcohol use  will f/u    #macrocytic anemia  -likely secondary to alcohol use  -b12 and folate pending, will f/u    #Electrolyte disturbances   K, phos and magnesium repleted during hospital stay    Inpatient treatment course: As above  New medications: PPI  Labs to be followed outpatient: Hepatitis Panel, B12, Folate, CBC, CMP  Exam to be followed outpatient:        #Discharge: do not delete    Patient is a 45 y/o Female with PMHx chronic alcoholism (use of 0.5-1L vodka daily, denies history of withdrawal seizures or ICU admissions), anxiety, depression presenting with nausea and vomiting roughly every 2 hours since Saturday. Patient also reports a one day history of coffee ground emesis. Endorses intermittent abdominal pain, can radiate to her back. Lipase < 1500. CTAP consistent with pancreatitis. CIWA 0. Patient admitted for alcohol withdrawal management and pancreatitis.     Problem List/Main Diagnoses (system-based):   #Alcohol withdrawal  -Patient reports chronic use of 0.5-1L of vodka daily  -patient denies a history of alcohol withdrawal seizures or ICU stays  -Patient was on PRN Ativan and CIWA remained 0-4  -Tremors resolved over hospital stay. Patient denied visual/auditory/tactile hallucinations  -Patient plans to go to a rehab program once finding the best fit for her, however does not want to go directly to rehab and would prefer to go home first  -will be discharged home until a rehab program is found    #Pancreatitis  -On admission, lipase >1500 and CT consistent with pancreatitis  -lactate of 5.8, repeat of 1.4  -no signs of end organ failure  -started on IVF, with repeat lipase ~1000  -patient tolerated PO intake with no further nausea or vomiting  -BC and UC negative    #Intractable vomiting/Coffee ground emesis  -patient reports a few day history of nausea and vomiting with limited PO intake  -patient noticed coffee ground emesis prior to her arrival to the ED  -Hgb of 10.9 on arrival, repeat of 9.3, followed by 9.6  -GI evaluated the patient given minimal drop in Hgb. No need for endoscopy at this time. Recommended IV PPI BID and Carafate    #Mild transaminitis  -AST 83/ALT 46  -CTAP showed Severe hepatic steatosis. Smooth hepatic contour  -Hepatitis panel negative  -May be secondary to alcohol use  -f/u PCP    #macrocytic anemia  -likely secondary to alcohol use  -b12 and folate pending, will f/u    #Electrolyte disturbances   K, phos and magnesium repleted during hospital stay    Inpatient treatment course: As above  New medications: PPI  Labs to be followed outpatient: B12, Folate, CBC, CMP  Exam to be followed outpatient: none

## 2021-07-01 NOTE — PROGRESS NOTE ADULT - PROBLEM SELECTOR PLAN 9
F: LR @ 150 mL/hour  E: replete prn  N: regular    VTE Prophylaxis: lovenox  GI: not needed  C: Full Code  Dispo: [facility] F: LR @ 150 mL/hour  E: replete prn  N: regular    VTE Prophylaxis: lovenox  GI: Protonix and Carafate due to hematemsis  C: Full Code  Dispo: Pending Librium taper and resolution of tachycardia ADDENDUM: monitor QTc, avoid QTc prolonging medications, monitor and replete lytes ADDENDUM: monitor QTc, avoid QTc prolonging medications, monitor and replete lytes    repeat EKG overnight was 580ms, pending cardiology consult ; K and Mg repleted at 5PM. Phosphate currently being repleted.

## 2021-07-01 NOTE — PROGRESS NOTE ADULT - ATTENDING COMMENTS
Tolerating po diet, no abd pain or N/V. Pt wants to go home to explore inpatient rehab options. Repeat K and plan for discharge.

## 2021-07-01 NOTE — PROGRESS NOTE ADULT - PROBLEM SELECTOR PLAN 5
On admission found to have Anion gap 35 -> 23, lactate 5.8 -> 1.4 and sodium bicarb 9. UA also notable for ketones.   - BHB of 4.6 noted   -ABG showed pH of 7.41, pCO2 30, HCO3 19  -elevated blood glucose of 416 -->140  - s/p 2L LR and D5NS 2L in the ED  - likely secondary to starvation ketosis, as patient has not been tolerating PO intake for the past few days  - f/u repeat BMP to monitor anion gap Likely is/o alcohol use versus gastritis. s/p reglan 10mg, zofran 4mg, and pantoprazole 80 mg IVP in the ED.   - c/w pantoprazole 40mg daily   - PRN zofran 4mg for nausea/vomiting  - QTc 444ms as of 6/29  -improved nausea today. Dysphagia screening passed.   -Patient tolerated clear liquid diet, advanced to full liquids Likely is/o alcohol use versus gastritis. s/p reglan 10mg, zofran 4mg, and pantoprazole 80 mg IVP in the ED.   - c/w pantoprazole 40mg BID  - QTc 544ms as of 7/1 - cautious with zofran  -nausea resolved. Dysphagia screening passed.   -Patient tolerating regular diet

## 2021-07-01 NOTE — PROGRESS NOTE ADULT - SUBJECTIVE AND OBJECTIVE BOX
HOSPITAL COURSE: 45 y/o Female with PMHx chronic alcoholism, anxiety, depression presenting with nausea and vomiting roughly every 2 hours since Saturday. She has a history of severe alcohol abuse for which she drinks 0.5-1.0 L of Vodka per day. During this time span she has been unable to keep up with PO liquids or foods, no food since saturday (). Reports to vomiting some blood yesterday (), described as coffee ground emesis but none today, most recent episode of emesis at 8:30 pm , still with residual nausea. Reports sensation of something stuck in the back of her throat.  Endorses intermittent abdominal pain, can radiate to her back. Denies diarrhea and ROS otherwise negative. According to the  while at [facility], she has undergone alcohol detoxication many times in the past, but never went to rehab. The patient denies history of alcohol withdrawal related seizures or ICU stays. Also denies history of pancreatitis. In the ED, lipase >1500, K of 2.9, Mg of 1.4, lactate of 5.8 (improved to 1.4) and anion gap of 35 (improved to 19). CT AP consistent with pancreatitis. Patient started on IVF and received one dose of Vanc and Zosyn in the ED. Patient's CIWA remained 0-4 during admission and tremors resolved. GI consulted, regarding coffee ground emesis and mild drop of Hgb (10.9-->9.3). No EGD needed at this time, recommended to continue with PPI BID. Macrocytic anemia noted, likely secondary to chronic alcohol use, B12 and folate pending. Phosphorus of 0.4 on , repleted and will continue to monitor. Mild transaminitis with AST 83/ALT 46. Hepatitis panel pending. CTAP showed Severe hepatic steatosis. Smooth hepatic contour.]    **INCOMPLETE NOTE    OVERNIGHT EVENTS:    SUBJECTIVE:  Patient seen and examined at bedside.    Vital Signs Last 12 Hrs  T(F): 98.6 (21 @ 05:35), Max: 100.6 (21 @ 21:06)  HR: 94 (21 @ 04:00) (94 - 100)  BP: 119/84 (21 @ 04:00) (119/84 - 131/87)  BP(mean): 97 (21 @ 04:00) (97 - 104)  RR: 18 (21 @ 04:00) (18 - 18)  SpO2: 98% (21 @ 04:00) (95% - 98%)  I&O's Summary    2021 07:01  -  2021 07:00  --------------------------------------------------------  IN: 1200 mL / OUT: 3801 mL / NET: -2601 mL        PHYSICAL EXAM:  Constitutional: NAD, comfortable in bed.  HEENT: NC/AT, PERRLA, EOMI, no conjunctival pallor or scleral icterus, MMM  Neck: Supple, no JVD  Respiratory: CTA B/L. No w/r/r.   Cardiovascular: RRR, normal S1 and S2, no m/r/g.   Gastrointestinal: +BS, soft NTND, no guarding or rebound tenderness, no palpable masses   Extremities: wwp; no cyanosis, clubbing or edema.   Vascular: Pulses equal and strong throughout.   Neurological: AAOx3, no CN deficits, strength and sensation intact throughout.   Skin: No gross skin abnormalities or rashes        LABS:                        9.6    4.45  )-----------( 129      ( 2021 06:39 )             29.0     07-01    135  |  94<L>  |  2<L>  ----------------------------<  93  3.4<L>   |  24  |  0.43<L>    Ca    7.1<L>      2021 06:39  Phos  2.5     07-  Mg     2.4     07-    TPro  6.5  /  Alb  3.2<L>  /  TBili  1.1  /  DBili  x   /  AST  65<H>  /  ALT  33  /  AlkPhos  43  06-30    PT/INR - ( 2021 22:13 )   PT: 13.0 sec;   INR: 1.11          PTT - ( 2021 22:13 )  PTT:25.4 sec  Urinalysis Basic - ( 2021 01:53 )    Color: Yellow / Appearance: Clear / S.020 / pH: x  Gluc: x / Ketone: >=80 mg/dL  / Bili: NEGATIVE / Urobili: 0.2 E.U./dL   Blood: x / Protein: 30 mg/dL / Nitrite: NEGATIVE   Leuk Esterase: NEGATIVE / RBC: < 5 /HPF / WBC < 5 /HPF   Sq Epi: x / Non Sq Epi: x / Bacteria: x          RADIOLOGY & ADDITIONAL TESTS:    MEDICATIONS  (STANDING):  folic acid 1 milliGRAM(s) Oral daily  lactated ringers. 1000 milliLiter(s) (200 mL/Hr) IV Continuous <Continuous>  multivitamin 1 Tablet(s) Oral every 24 hours  pantoprazole    Tablet 40 milliGRAM(s) Oral every 12 hours  potassium chloride   Powder 40 milliEquivalent(s) Oral once  sucralfate suspension 1 Gram(s) Oral four times a day  thiamine 100 milliGRAM(s) Oral every 24 hours    MEDICATIONS  (PRN):  LORazepam   Injectable 2 milliGRAM(s) IV Push every 1 hour PRN CIWA-Ar score 8 or greater  ondansetron Injectable 4 milliGRAM(s) IV Push every 6 hours PRN Nausea and/or Vomiting     HOSPITAL COURSE: 47 y/o Female with PMHx chronic alcoholism, anxiety, depression presenting with nausea and vomiting roughly every 2 hours since Saturday. She has a history of severe alcohol abuse for which she drinks 0.5-1.0 L of Vodka per day. During this time span she has been unable to keep up with PO liquids or foods, no food since saturday (). Reports to vomiting some blood yesterday (), described as coffee ground emesis but none today, most recent episode of emesis at 8:30 pm , still with residual nausea. Reports sensation of something stuck in the back of her throat.  Endorses intermittent abdominal pain, can radiate to her back. Denies diarrhea and ROS otherwise negative. According to the  while at [facility], she has undergone alcohol detoxication many times in the past, but never went to rehab. The patient denies history of alcohol withdrawal related seizures or ICU stays. Also denies history of pancreatitis. In the ED, lipase >1500, K of 2.9, Mg of 1.4, lactate of 5.8 (improved to 1.4) and anion gap of 35 (improved to 19). CT AP consistent with pancreatitis. Patient started on IVF and received one dose of Vanc and Zosyn in the ED. Patient's CIWA remained 0-4 during admission and tremors resolved. GI consulted, regarding coffee ground emesis and mild drop of Hgb (10.9-->9.3). No EGD needed at this time, recommended to continue with PPI BID. Macrocytic anemia noted, likely secondary to chronic alcohol use, B12 and folate pending. Phosphorus of 0.4 on , repleted and will continue to monitor. Mild transaminitis with AST 83/ALT 46. Hepatitis panel pending. CTAP showed Severe hepatic steatosis. Smooth hepatic contour.]    OVERNIGHT EVENTS: Patient had a low grade temperature of 100.6. Patient had no further episodes of vomiting. CIWA of 0, no Ativan required.    SUBJECTIVE:  Patient seen and examined at bedside. Patient reports improvement in nausea and vomiting, denies further episodes of vomiting. Patient continues to complain of a "burning sensation" in the back of her throat and reports a mild associated cough. Otherwise, patient denies shortness of breath, productive cough, chest pain, shortness of breath, dysuria, hematuria, or any other complaints.     Vital Signs Last 12 Hrs  T(F): 98.6 (21 @ 05:35), Max: 100.6 (21 @ 21:06)  HR: 94 (21 @ 04:00) (94 - 100)  BP: 119/84 (21 @ 04:00) (119/84 - 131/87)  BP(mean): 97 (21 @ 04:00) (97 - 104)  RR: 18 (21 @ 04:00) (18 - 18)  SpO2: 98% (21 @ 04:00) (95% - 98%)  I&O's Summary    2021 07:01  -  2021 07:00  --------------------------------------------------------  IN: 1200 mL / OUT: 3801 mL / NET: -2601 mL        PHYSICAL EXAM:  Constitutional: NAD, comfortable in bed. CIWA 0  HEENT: NC/AT, PERRLA, EOMI, no conjunctival pallor or scleral icterus, MMM  Neck: Supple, no JVD  Respiratory: CTA B/L. No w/r/r.   Cardiovascular: Regular rhythm, tachycardic. normal S1 and S2, no m/r/g.   Gastrointestinal: +BS, soft NTND, no guarding or rebound tenderness, no palpable masses   Extremities: wwp; no cyanosis, clubbing or edema.   Vascular: Pulses equal and strong throughout.   Neurological: AAOx3, no CN deficits, strength and sensation intact throughout.   Skin: No gross skin abnormalities or rashes        LABS:                        9.6    4.45  )-----------( 129      ( 2021 06:39 )             29.0         135  |  94<L>  |  2<L>  ----------------------------<  93  3.4<L>   |  24  |  0.43<L>    Ca    7.1<L>      2021 06:39  Phos  2.5     07-  Mg     2.4     07-    TPro  6.5  /  Alb  3.2<L>  /  TBili  1.1  /  DBili  x   /  AST  65<H>  /  ALT  33  /  AlkPhos  43  06-30    PT/INR - ( 2021 22:13 )   PT: 13.0 sec;   INR: 1.11          PTT - ( 2021 22:13 )  PTT:25.4 sec  Urinalysis Basic - ( 2021 01:53 )    Color: Yellow / Appearance: Clear / S.020 / pH: x  Gluc: x / Ketone: >=80 mg/dL  / Bili: NEGATIVE / Urobili: 0.2 E.U./dL   Blood: x / Protein: 30 mg/dL / Nitrite: NEGATIVE   Leuk Esterase: NEGATIVE / RBC: < 5 /HPF / WBC < 5 /HPF   Sq Epi: x / Non Sq Epi: x / Bacteria: x          RADIOLOGY & ADDITIONAL TESTS:    MEDICATIONS  (STANDING):  folic acid 1 milliGRAM(s) Oral daily  lactated ringers. 1000 milliLiter(s) (200 mL/Hr) IV Continuous <Continuous>  multivitamin 1 Tablet(s) Oral every 24 hours  pantoprazole    Tablet 40 milliGRAM(s) Oral every 12 hours  potassium chloride   Powder 40 milliEquivalent(s) Oral once  sucralfate suspension 1 Gram(s) Oral four times a day  thiamine 100 milliGRAM(s) Oral every 24 hours    MEDICATIONS  (PRN):  LORazepam   Injectable 2 milliGRAM(s) IV Push every 1 hour PRN CIWA-Ar score 8 or greater  ondansetron Injectable 4 milliGRAM(s) IV Push every 6 hours PRN Nausea and/or Vomiting     HOSPITAL COURSE: 45 y/o Female with PMHx chronic alcoholism, anxiety, depression presenting with nausea and vomiting roughly every 2 hours since Saturday. She has a history of severe alcohol abuse for which she drinks 0.5-1.0 L of Vodka per day. During this time span she has been unable to keep up with PO liquids or foods, no food since saturday (). Reports to vomiting some blood yesterday (), described as coffee ground emesis but none today, most recent episode of emesis at 8:30 pm , still with residual nausea. Patient also reports sensation of something stuck in the back of her throat.  Endorses intermittent abdominal pain, can radiate to her back. Denies diarrhea and ROS otherwise negative. According to the  while at [facility], she has undergone alcohol detoxication many times in the past, but never went to rehab. The patient denies history of alcohol withdrawal related seizures or ICU stays. Also denies history of pancreatitis. In the ED, lipase >1500, K of 2.9, Mg of 1.4, lactate of 5.8 (improved to 1.4) and anion gap of 35 (improved to 19). CT AP consistent with pancreatitis. Patient started on IVF and received one dose of Vanc and Zosyn in the ED. BC and UC pending. Patient's CIWA remained 0-4 during admission and tremors resolved. GI consulted, regarding coffee ground emesis and mild drop of Hgb (10.9-->9.3). No EGD needed at this time, recommended to continue with PPI BID. Macrocytic anemia noted, likely secondary to chronic alcohol use, B12 and folate pending. Phosphorus of 0.4 on , repleted and will continue to monitor. Mild transaminitis with AST 83/ALT 46. Hepatitis panel pending. CTAP showed Severe hepatic steatosis. Smooth hepatic contour.     OVERNIGHT EVENTS: Patient had a low grade temperature of 100.6. Patient had no further episodes of vomiting. CIWA of 0, no Ativan required.    SUBJECTIVE:  Patient seen and examined at bedside. Patient reports improvement in nausea and vomiting, denies further episodes of vomiting. Patient continues to complain of a "burning sensation" in the back of her throat and reports a mild associated cough. Otherwise, patient denies shortness of breath, productive cough, chest pain, shortness of breath, dysuria, hematuria, or any other complaints.     Vital Signs Last 12 Hrs  T(F): 98.6 (21 @ 05:35), Max: 100.6 (21 @ 21:06)  HR: 94 (21 @ 04:00) (94 - 100)  BP: 119/84 (21 @ 04:00) (119/84 - 131/87)  BP(mean): 97 (21 @ 04:00) (97 - 104)  RR: 18 (21 @ 04:00) (18 - 18)  SpO2: 98% (21 @ 04:00) (95% - 98%)  I&O's Summary    2021 07:01  -  2021 07:00  --------------------------------------------------------  IN: 1200 mL / OUT: 3801 mL / NET: -2601 mL        PHYSICAL EXAM:  Constitutional: NAD, comfortable in bed. CIWA 0  HEENT: NC/AT, PERRLA, EOMI, no conjunctival pallor or scleral icterus, MMM  Neck: Supple, no JVD  Respiratory: CTA B/L. No w/r/r.   Cardiovascular: Regular rhythm, tachycardic. normal S1 and S2, no m/r/g.   Gastrointestinal: +BS, soft NTND, no guarding or rebound tenderness, no palpable masses   Extremities: wwp; no cyanosis, clubbing or edema.   Vascular: Pulses equal and strong throughout.   Neurological: AAOx3, no CN deficits, strength and sensation intact throughout.   Skin: No gross skin abnormalities or rashes        LABS:                        9.6    4.45  )-----------( 129      ( 2021 06:39 )             29.0         135  |  94<L>  |  2<L>  ----------------------------<  93  3.4<L>   |  24  |  0.43<L>    Ca    7.1<L>      2021 06:39  Phos  2.5     07-  Mg     2.4     07-    TPro  6.5  /  Alb  3.2<L>  /  TBili  1.1  /  DBili  x   /  AST  65<H>  /  ALT  33  /  AlkPhos  43  06-30    PT/INR - ( 2021 22:13 )   PT: 13.0 sec;   INR: 1.11          PTT - ( 2021 22:13 )  PTT:25.4 sec  Urinalysis Basic - ( 2021 01:53 )    Color: Yellow / Appearance: Clear / S.020 / pH: x  Gluc: x / Ketone: >=80 mg/dL  / Bili: NEGATIVE / Urobili: 0.2 E.U./dL   Blood: x / Protein: 30 mg/dL / Nitrite: NEGATIVE   Leuk Esterase: NEGATIVE / RBC: < 5 /HPF / WBC < 5 /HPF   Sq Epi: x / Non Sq Epi: x / Bacteria: x          RADIOLOGY & ADDITIONAL TESTS:    MEDICATIONS  (STANDING):  folic acid 1 milliGRAM(s) Oral daily  lactated ringers. 1000 milliLiter(s) (200 mL/Hr) IV Continuous <Continuous>  multivitamin 1 Tablet(s) Oral every 24 hours  pantoprazole    Tablet 40 milliGRAM(s) Oral every 12 hours  potassium chloride   Powder 40 milliEquivalent(s) Oral once  sucralfate suspension 1 Gram(s) Oral four times a day  thiamine 100 milliGRAM(s) Oral every 24 hours    MEDICATIONS  (PRN):  LORazepam   Injectable 2 milliGRAM(s) IV Push every 1 hour PRN CIWA-Ar score 8 or greater  ondansetron Injectable 4 milliGRAM(s) IV Push every 6 hours PRN Nausea and/or Vomiting     HOSPITAL COURSE: 45 y/o Female with PMHx chronic alcoholism, anxiety, depression presenting with nausea and vomiting roughly every 2 hours since Saturday. She has a history of severe alcohol abuse for which she drinks 0.5-1.0 L of Vodka per day. During this time span she has been unable to keep up with PO liquids or foods, no food since saturday (). Reports to vomiting some blood yesterday (), described as coffee ground emesis but none today, most recent episode of emesis at 8:30 pm , still with residual nausea. Patient also reports sensation of something stuck in the back of her throat.  Endorses intermittent abdominal pain, can radiate to her back. Denies diarrhea and ROS otherwise negative. According to the  while at [facility], she has undergone alcohol detoxication many times in the past, but never went to rehab. The patient denies history of alcohol withdrawal related seizures or ICU stays. Also denies history of pancreatitis. In the ED, lipase >1500, K of 2.9, Mg of 1.4, lactate of 5.8 (improved to 1.4) and anion gap of 35 (improved to 19). CT AP consistent with pancreatitis. Patient started on IVF and received one dose of Vanc and Zosyn in the ED. BC and UC pending. Started on CIWA protocol. GI consulted, regarding coffee ground emesis and mild drop of Hgb (10.9-->9.3). No EGD needed at this time and Hb stable on repeat without further emesis, recommended to continue with PPI BID. Macrocytic anemia noted, likely secondary to chronic alcohol use, B12 and folate pending. Phosphorus requiring continuous repletion. AST 83/ALT 46 with hepatitis panel negative. CTAP showed Severe hepatic steatosis. Smooth hepatic contour. Overnight  pt with temperature 100.6 without localizing symptoms. 7/ pt spiked fever 100.5 with tachycardia to 120s. Given ativan .5mg IV and started on librium due to concern for withdrawal due to tremor and baseline anxiety. Stable for transfer to [facility].    SUBJECTIVE:  Patient seen and examined at bedside. Patient reports improvement in nausea and vomiting, denies further episodes of vomiting. Patient continues to complain of a "burning sensation" in the back of her throat and reports a mild associated cough. Otherwise, patient denies shortness of breath, productive cough, chest pain, shortness of breath, dysuria, hematuria, or any other complaints.     Vital Signs Last 12 Hrs  T(F): 98.6 (21 @ 05:35), Max: 100.6 (21 @ 21:06)  HR: 94 (21 @ 04:00) (94 - 100)  BP: 119/84 (21 @ 04:00) (119/84 - 131/87)  BP(mean): 97 (21 @ 04:00) (97 - 104)  RR: 18 (21 @ 04:00) (18 - 18)  SpO2: 98% (21 @ 04:00) (95% - 98%)  I&O's Summary    2021 07:01  -  2021 07:00  --------------------------------------------------------  IN: 1200 mL / OUT: 3801 mL / NET: -2601 mL        PHYSICAL EXAM:  Constitutional: NAD, comfortable in bed. CIWA 0  HEENT: NC/AT, PERRLA, EOMI, no conjunctival pallor or scleral icterus, MMM  Neck: Supple, no JVD  Respiratory: CTA B/L. No w/r/r.   Cardiovascular: Regular rhythm, tachycardic. normal S1 and S2, no m/r/g.   Gastrointestinal: +BS, soft NTND, no guarding or rebound tenderness, no palpable masses   Extremities: wwp; no cyanosis, clubbing or edema.   Vascular: Pulses equal and strong throughout.   Neurological: AAOx3, no CN deficits, strength and sensation intact throughout.   Skin: No gross skin abnormalities or rashes        LABS:                        9.6    4.45  )-----------( 129      ( 2021 06:39 )             29.0         135  |  94<L>  |  2<L>  ----------------------------<  93  3.4<L>   |  24  |  0.43<L>    Ca    7.1<L>      2021 06:39  Phos  2.5     07-  Mg     2.4     07-    TPro  6.5  /  Alb  3.2<L>  /  TBili  1.1  /  DBili  x   /  AST  65<H>  /  ALT  33  /  AlkPhos  43  06-30    PT/INR - ( 2021 22:13 )   PT: 13.0 sec;   INR: 1.11          PTT - ( 2021 22:13 )  PTT:25.4 sec  Urinalysis Basic - ( 2021 01:53 )    Color: Yellow / Appearance: Clear / S.020 / pH: x  Gluc: x / Ketone: >=80 mg/dL  / Bili: NEGATIVE / Urobili: 0.2 E.U./dL   Blood: x / Protein: 30 mg/dL / Nitrite: NEGATIVE   Leuk Esterase: NEGATIVE / RBC: < 5 /HPF / WBC < 5 /HPF   Sq Epi: x / Non Sq Epi: x / Bacteria: x          RADIOLOGY & ADDITIONAL TESTS:    MEDICATIONS  (STANDING):  folic acid 1 milliGRAM(s) Oral daily  lactated ringers. 1000 milliLiter(s) (200 mL/Hr) IV Continuous <Continuous>  multivitamin 1 Tablet(s) Oral every 24 hours  pantoprazole    Tablet 40 milliGRAM(s) Oral every 12 hours  potassium chloride   Powder 40 milliEquivalent(s) Oral once  sucralfate suspension 1 Gram(s) Oral four times a day  thiamine 100 milliGRAM(s) Oral every 24 hours    MEDICATIONS  (PRN):  LORazepam   Injectable 2 milliGRAM(s) IV Push every 1 hour PRN CIWA-Ar score 8 or greater  ondansetron Injectable 4 milliGRAM(s) IV Push every 6 hours PRN Nausea and/or Vomiting

## 2021-07-01 NOTE — PROGRESS NOTE ADULT - PROBLEM SELECTOR PLAN 1
- Patient states that the last drink was on 6/28 (within 72 hour threshold for delirium tremens)  - Librium 25 mg q8h initiated on 7/1. Taper as appropriate to Ativan PRN pushes  - Continue CIWA protocol q6 hours and Ativan 2 mg PRN for CIWA >8  - On exam, CIWA 5    #Electrolyte Derangements  - K of 3.4 and P of 1.5 on 7/1  - Repleted with 30 mmol K Phos  - Continue to monitor for Refeeding Syndrome  - AM BMP, Magnesium, and Phosphorus level - Patient states that the last drink was on 6/28 (within 72 hour threshold for delirium tremens)  - Librium 25 mg q8h initiated on 7/1. Taper as appropriate to Ativan PRN pushes  - Continue CIWA protocol q6 hours and Ativan 2 mg PRN for CIWA >8  - On exam, CIWA 5    #Electrolyte Derangements  - K of 3.4 and P of 1.5 on 7/1  - Repleted with 30 mmol K Phos  - Continue to monitor for Refeeding Syndrome  - AM BMP, Magnesium, and Phosphorus level    #Alcohol Use Disorder  - As stated, drinks 0.5 to 1 L vodka daily  - SW consult in AM for resources for cessation  - Attributes drinking to anxiety and OCD but has family support  - F/U PCP and alcohol counseling prior to discharge

## 2021-07-01 NOTE — PROGRESS NOTE ADULT - PROBLEM SELECTOR PLAN 4
Likely is/o alcohol use versus gastritis. s/p reglan 10mg, zofran 4mg, and pantoprazole 80 mg IVP in the ED.   - c/w pantoprazole 40mg daily   - PRN zofran 4mg for nausea/vomiting  - QTc 444ms as of 6/29  -improved nausea today. Dysphagia screening passed.   -Patient started on clear liquid diet -Patient had a low grade temp of 100.6 last night  -patient reports a mild cough over the past few days, non-productive  -lungs are clear bilaterally. Patient denies dysuria  -no elevated WBC (4.45). CXR clear on admission  -will monitor symptoms and vitals, low suspicion for infectious etiology of one instance of fever  -patient already has BC and UC pending, will f/u -pt with Tm 100.6   -patient reports a mild cough since admission, non-productive, however CXR negative on admission, reports coughing has improved throughout stay  -UA negative on admission without urinary symptoms  -no elevated WBC (4.45). CXR clear on admission  -low suspicion for necrotic pancreatis due to clinical stability  -likely 2/2 pancreatis causing inflammatory state  Plan:  -monitor VS  -if clinically deteriorating, start broad spectrum Abx

## 2021-07-01 NOTE — PROGRESS NOTE ADULT - ASSESSMENT
Ms. Vivar is a 46 year old woman with a PMHx chronic alcoholism, anxiety, OCD, depression who presents with N/V with reported hematemesis for 4 days with hematemesis found to have pancreatitis (elevated lipase, CT findings abdominal pain).  Course complicated by EtOH withdrawal attributed tachycardia now undergoing Librium taper and CIWA protocol.

## 2021-07-01 NOTE — PROGRESS NOTE ADULT - PROBLEM SELECTOR PLAN 2
Patient has advanced diet to solid foods and denies N/V since admission. Denies any abdominal pain and nontender to palpation on repeat exam.  - Dry-appearing on exam. Start LR maintenance @ 150 mL/hour

## 2021-07-01 NOTE — PROGRESS NOTE ADULT - PROBLEM SELECTOR PLAN 5
Temperature of 100.5 on 7/1 attributed to reasons as above. Full fever workup not done due to low suspicion for infectious process  - Culture from 6/28 TD Temperature of 100.5 on 7/1 attributed to reasons as above. Full fever workup not done due to low suspicion for infectious process  - Culture from 6/28 NGTD  - Low threshold to repeat blood culture and imaging for pancreatitis complications

## 2021-07-02 VITALS — WEIGHT: 133.82 LBS

## 2021-07-02 LAB
ANION GAP SERPL CALC-SCNC: 22 MMOL/L — HIGH (ref 5–17)
BUN SERPL-MCNC: 3 MG/DL — LOW (ref 7–23)
CALCIUM SERPL-MCNC: 7.7 MG/DL — LOW (ref 8.4–10.5)
CHLORIDE SERPL-SCNC: 93 MMOL/L — LOW (ref 96–108)
CO2 SERPL-SCNC: 21 MMOL/L — LOW (ref 22–31)
CREAT SERPL-MCNC: 0.42 MG/DL — LOW (ref 0.5–1.3)
FOLATE SERPL-MCNC: 9.7 NG/ML — SIGNIFICANT CHANGE UP
GLUCOSE SERPL-MCNC: 146 MG/DL — HIGH (ref 70–99)
HCT VFR BLD CALC: 34.2 % — LOW (ref 34.5–45)
HGB BLD-MCNC: 10.7 G/DL — LOW (ref 11.5–15.5)
MAGNESIUM SERPL-MCNC: 1.8 MG/DL — SIGNIFICANT CHANGE UP (ref 1.6–2.6)
MCHC RBC-ENTMCNC: 31.2 PG — SIGNIFICANT CHANGE UP (ref 27–34)
MCHC RBC-ENTMCNC: 31.3 GM/DL — LOW (ref 32–36)
MCV RBC AUTO: 99.7 FL — SIGNIFICANT CHANGE UP (ref 80–100)
NRBC # BLD: 0 /100 WBCS — SIGNIFICANT CHANGE UP (ref 0–0)
PHOSPHATE SERPL-MCNC: 2 MG/DL — LOW (ref 2.5–4.5)
PLATELET # BLD AUTO: 178 K/UL — SIGNIFICANT CHANGE UP (ref 150–400)
POTASSIUM SERPL-MCNC: 3.4 MMOL/L — LOW (ref 3.5–5.3)
POTASSIUM SERPL-SCNC: 3.4 MMOL/L — LOW (ref 3.5–5.3)
RBC # BLD: 3.43 M/UL — LOW (ref 3.8–5.2)
RBC # FLD: 18.4 % — HIGH (ref 10.3–14.5)
SODIUM SERPL-SCNC: 136 MMOL/L — SIGNIFICANT CHANGE UP (ref 135–145)
VIT B12 SERPL-MCNC: 1531 PG/ML — HIGH (ref 232–1245)
WBC # BLD: 5.57 K/UL — SIGNIFICANT CHANGE UP (ref 3.8–10.5)
WBC # FLD AUTO: 5.57 K/UL — SIGNIFICANT CHANGE UP (ref 3.8–10.5)

## 2021-07-02 PROCEDURE — 81001 URINALYSIS AUTO W/SCOPE: CPT

## 2021-07-02 PROCEDURE — 99285 EMERGENCY DEPT VISIT HI MDM: CPT

## 2021-07-02 PROCEDURE — 82803 BLOOD GASES ANY COMBINATION: CPT

## 2021-07-02 PROCEDURE — 85730 THROMBOPLASTIN TIME PARTIAL: CPT

## 2021-07-02 PROCEDURE — 86900 BLOOD TYPING SEROLOGIC ABO: CPT

## 2021-07-02 PROCEDURE — 80074 ACUTE HEPATITIS PANEL: CPT

## 2021-07-02 PROCEDURE — 85027 COMPLETE CBC AUTOMATED: CPT

## 2021-07-02 PROCEDURE — 83735 ASSAY OF MAGNESIUM: CPT

## 2021-07-02 PROCEDURE — 36415 COLL VENOUS BLD VENIPUNCTURE: CPT

## 2021-07-02 PROCEDURE — 82746 ASSAY OF FOLIC ACID SERUM: CPT

## 2021-07-02 PROCEDURE — 82962 GLUCOSE BLOOD TEST: CPT

## 2021-07-02 PROCEDURE — 81025 URINE PREGNANCY TEST: CPT

## 2021-07-02 PROCEDURE — 86850 RBC ANTIBODY SCREEN: CPT

## 2021-07-02 PROCEDURE — 80053 COMPREHEN METABOLIC PANEL: CPT

## 2021-07-02 PROCEDURE — 80307 DRUG TEST PRSMV CHEM ANLYZR: CPT

## 2021-07-02 PROCEDURE — 82010 KETONE BODYS QUAN: CPT

## 2021-07-02 PROCEDURE — 99253 IP/OBS CNSLTJ NEW/EST LOW 45: CPT

## 2021-07-02 PROCEDURE — 99233 SBSQ HOSP IP/OBS HIGH 50: CPT | Mod: GC

## 2021-07-02 PROCEDURE — 71045 X-RAY EXAM CHEST 1 VIEW: CPT

## 2021-07-02 PROCEDURE — 85610 PROTHROMBIN TIME: CPT

## 2021-07-02 PROCEDURE — 74177 CT ABD & PELVIS W/CONTRAST: CPT

## 2021-07-02 PROCEDURE — 82607 VITAMIN B-12: CPT

## 2021-07-02 PROCEDURE — 87635 SARS-COV-2 COVID-19 AMP PRB: CPT

## 2021-07-02 PROCEDURE — 93005 ELECTROCARDIOGRAM TRACING: CPT

## 2021-07-02 PROCEDURE — 84100 ASSAY OF PHOSPHORUS: CPT

## 2021-07-02 PROCEDURE — 83605 ASSAY OF LACTIC ACID: CPT

## 2021-07-02 PROCEDURE — 83690 ASSAY OF LIPASE: CPT

## 2021-07-02 PROCEDURE — 84702 CHORIONIC GONADOTROPIN TEST: CPT

## 2021-07-02 PROCEDURE — 82140 ASSAY OF AMMONIA: CPT

## 2021-07-02 PROCEDURE — 86901 BLOOD TYPING SEROLOGIC RH(D): CPT

## 2021-07-02 PROCEDURE — 80048 BASIC METABOLIC PNL TOTAL CA: CPT

## 2021-07-02 PROCEDURE — 87040 BLOOD CULTURE FOR BACTERIA: CPT

## 2021-07-02 PROCEDURE — 84484 ASSAY OF TROPONIN QUANT: CPT

## 2021-07-02 PROCEDURE — 82248 BILIRUBIN DIRECT: CPT

## 2021-07-02 PROCEDURE — 86769 SARS-COV-2 COVID-19 ANTIBODY: CPT

## 2021-07-02 PROCEDURE — 80061 LIPID PANEL: CPT

## 2021-07-02 PROCEDURE — 87086 URINE CULTURE/COLONY COUNT: CPT

## 2021-07-02 PROCEDURE — 93010 ELECTROCARDIOGRAM REPORT: CPT

## 2021-07-02 RX ORDER — POTASSIUM PHOSPHATE, MONOBASIC POTASSIUM PHOSPHATE, DIBASIC 236; 224 MG/ML; MG/ML
30 INJECTION, SOLUTION INTRAVENOUS ONCE
Refills: 0 | Status: COMPLETED | OUTPATIENT
Start: 2021-07-02 | End: 2021-07-02

## 2021-07-02 RX ADMIN — PANTOPRAZOLE SODIUM 40 MILLIGRAM(S): 20 TABLET, DELAYED RELEASE ORAL at 17:30

## 2021-07-02 RX ADMIN — Medication 1 MILLIGRAM(S): at 11:42

## 2021-07-02 RX ADMIN — Medication 1 GRAM(S): at 17:30

## 2021-07-02 RX ADMIN — PANTOPRAZOLE SODIUM 40 MILLIGRAM(S): 20 TABLET, DELAYED RELEASE ORAL at 05:53

## 2021-07-02 RX ADMIN — Medication 1 TABLET(S): at 07:11

## 2021-07-02 RX ADMIN — ENOXAPARIN SODIUM 40 MILLIGRAM(S): 100 INJECTION SUBCUTANEOUS at 10:02

## 2021-07-02 RX ADMIN — SODIUM CHLORIDE 150 MILLILITER(S): 9 INJECTION, SOLUTION INTRAVENOUS at 02:33

## 2021-07-02 RX ADMIN — Medication 1 GRAM(S): at 05:53

## 2021-07-02 RX ADMIN — Medication 1 GRAM(S): at 11:43

## 2021-07-02 RX ADMIN — Medication 100 MILLIGRAM(S): at 07:11

## 2021-07-02 RX ADMIN — POTASSIUM PHOSPHATE, MONOBASIC POTASSIUM PHOSPHATE, DIBASIC 83.33 MILLIMOLE(S): 236; 224 INJECTION, SOLUTION INTRAVENOUS at 10:02

## 2021-07-02 NOTE — PROGRESS NOTE ADULT - PROBLEM SELECTOR PLAN 8
ADDENDUM: monitor QTc, avoid QTc prolonging medications, monitor and replete lytes    repeat EKG overnight was 580ms; per cardiology can stay on floor and repeat EKG daily  -repeat EKG today showing QTc nish Thompson repleted this am

## 2021-07-02 NOTE — PROGRESS NOTE ADULT - PROBLEM SELECTOR PLAN 1
- Patient states that the last drink was on 6/28 (within 72 hour threshold for delirium tremens)  - Librium 25 mg q8h initiated on 7/1. Taper as appropriate to Ativan PRN pushes  - Continue CIWA protocol q6 hours and Ativan 2 mg PRN for CIWA >8  - On exam, CIWA 0 today    #Electrolyte Derangements  - K of 3.4 and P of 2.0 on 7/2  - Repleted with 30 mmol K Phos  - Continue to monitor for Refeeding Syndrome  - AM BMP, Magnesium, and Phosphorus level    #Alcohol Use Disorder  - As stated, drinks 0.5 to 1 L vodka daily  -  consult for resources for cessation  - Attributes drinking to anxiety and OCD but has family support  - F/U PCP and alcohol counseling prior to discharge

## 2021-07-02 NOTE — DIETITIAN INITIAL EVALUATION ADULT. - PROBLEM SELECTOR PLAN 1
Known history of chronic alcohol consumption. Typically consumes 0.5-1.0 L vodka per day. According to the  while at [facility], she has undergone alcohol detoxication many times in the past, but never went to rehab. Patient found to have ITALO 41 on admission. Last reported drink 5pm yesterday. Reports history of withdrawals, but no seizures/DTs or intubations. Poor historian as unsure of last withdrawal.   - CIWA high risk protocol with PRN ativan   - starting thiamine/MV/folic acid   - low threshold to start librium taper  -CIWA of 4 (+1 nausea, +2 tremors, +1 visual disturbances) @5:00am

## 2021-07-02 NOTE — DIETITIAN INITIAL EVALUATION ADULT. - PROBLEM SELECTOR PLAN 4
On admission found to have Anion gap 35 -> 23, lactate 5.8 -> 1.4 and sodium bicarb 9. Likely in setting of starvation ketosis as UA also notable for ketones. However, underlying DKA cannot be ruled out as BHB not checked at [facility] and repeat BMP showed elevated blood glucose of 416.    - s/p 2L LR and D5NS 2L in the ED  - f/u repeat BMP to monitor anion gap  - f/u BHB  - f/u ABG given low serum bicarb

## 2021-07-02 NOTE — CONSULT NOTE ADULT - ATTENDING COMMENTS
Initial attending contact date  7/2/21    . See fellow note written above for details. I reviewed the fellow documentation. I have personally seen and examined this patient. I reviewed vitals, labs, medications, cardiac studies, and additional imaging. I agree with the above fellow's findings and plans as written above with the following additions/statements.      46F PMH etoh use d/o, anxiety/depression p/w n/v/hematemesis found with pancreatitis and etoh withdrawal. Cardiology consulted for prolonged QTc (539 by Bazett) in setting of electrolyte derangements 2/2 pancreatitis and vomiting.    #QTc prolongation  -Repeat EKG today with   -Pt not on any or has not taken any Qt prolonged meds prior to admission  - K>4, Mg>2  - daily ECG; repeat after normalized electrolytes  - avoid QTc prolonging agents  -To fu with Dr Nico Zhong for outpatient cardiology fu

## 2021-07-02 NOTE — PROGRESS NOTE ADULT - ATTENDING COMMENTS
# Alcohol withdrawal  Started on librium taper by critical care team yesterday for CIWA 8 or greater.   Patient saying that she wants to go home today.  Advised patient to stay for completion of librium taper. Patient was able to voice understanding of risks of leaving against medical advice, including, but not limited to injury and death. Patient has capacity to make decisions, elected to leave AMA today.    # Incidental finding of liver lesion   CT scan showed incidentally found liver lesion, 2.7 cm segment 7 lesion, per read, possible hemangioma  Patient says that she is aware of lesion, has had MRI In [facility] system, most recently two months ago. Will follow up with her [facility] physicians    # QTC prolongation  Evaluated by cardiology. qtc 530ms using bazett formula. f/u outpatient recommended. # Alcohol withdrawal  Started on librium taper by critical care team yesterday for CIWA 8 or greater.   Patient saying that she wants to go home today.  Advised patient to stay for completion of librium taper. Patient was able to voice understanding of risks of leaving against medical advice, including, but not limited to injury and death. Patient has capacity to make decisions, elected to leave AMA today.    # Hematemesis  Seen by GI , no indication for inpatient EGD. Hgb Stable  will get PPI, carafate, GI f/u on discharge given episode of hematemesis.     # Incidental finding of liver lesion   CT scan showed incidentally found liver lesion, 2.7 cm segment 7 lesion, per read, possible hemangioma  Patient says that she is aware of lesion, has had MRI In [facility] system, most recently two months ago. Will follow up with her [facility] physicians    # QTC prolongation  Evaluated by cardiology. qtc 530ms using bazett formula. f/u outpatient recommended.

## 2021-07-02 NOTE — PROGRESS NOTE ADULT - PROBLEM SELECTOR PLAN 4
Persistently elevated in 110s, likely multifactorial and can be attributed to dehydration, EtOH, and elevated cytokine activity 2/2 pancreatitis  - LR @ 150 mL/hour as above  - CIWA protocol as above  -HR 88 today, resolved

## 2021-07-02 NOTE — PROGRESS NOTE ADULT - PROBLEM SELECTOR PLAN 3
-patient reported a few episodes of coffee ground emesis on 6/29  -Patient reports a history of rectal bleeding in January 2021 and went to [facility]. EGD was planned at that time, but the patient left AMA  -Hgb of 10.9 in the ED, went down to 9.3 and improved to 10.7 today  -May be due to IVF, but will trend CBCs  -GI consulted, recommendations appreciated. No need for EGD at this time  -Continue Protonix BID and Carafate 1gm QID  - Monitor for HD instability and re consult GI for repeated hematemesis

## 2021-07-02 NOTE — DIETITIAN INITIAL EVALUATION ADULT. - OTHER INFO
47 y/o Female with PMHx chronic alcoholism, anxiety, depression who presents with N/V for 4 days with hematemesis yesterday, most recent emesis yesterday at 8:30pm, also p/w tremors 2/2 alcohol abuse and abdominal pain, admitted for alcohol withdrawal along with pancreatitis in setting of lipase >1500 and CT imaging consistent with diagnosis.     On assessment, pt seen sitting in bed. Pt reports feeling better, comfortable at this time. Reported UBW to be between 134-135 lbs, current wt is 133 lbs, reflects wt change of 1 lb/0.7%. Reports that her appetite is very on and off and can vary. Reports that she is [place], and that usually in [place] culture, they don't eat big meals like for breakfast, and would make up for it at other meal times. At current adm, she reports eating well, good appetite now and good PO intake. No n/v/d/c at time of assessment. No pain. GI: WDL, last BM 7/1. Skin integrity: Harsha 21, bruised. Will continue to follow per RD protocol.

## 2021-07-02 NOTE — PROGRESS NOTE ADULT - REASON FOR ADMISSION
Acute pancreatitis, alcohol withdrawal

## 2021-07-02 NOTE — DIETITIAN INITIAL EVALUATION ADULT. - OTHER CALCULATIONS
ABW used to calculate energy needs due to pt's current body weight within % IBW (91%). increased needs for pancreatitis/ alcohol withdrawal

## 2021-07-02 NOTE — PROGRESS NOTE ADULT - SUBJECTIVE AND OBJECTIVE BOX
OVERNIGHT EVENTS: Patient found to have QTc of 580, cardiology consulted, said ok to stay on floor and repeat EKG daily.     SUBJECTIVE / INTERVAL HPI: Patient seen and examined at bedside. Reports feeling much better and wants to go home. No nausea/vomiting overnight, stomach pain resolved. Does not want anymore librium as it gives her stomach pain. Tolerating solid food well. Denies hallucinations and tremulousness. Patient denying chest pain, SOB, palpitations, cough. Patient denies fever, chills, HA, Dizziness, change in vision/hearing, N/V, abdominal pain, diarrhea, constipation, hematochezia/melena, dysuria, hematuria, new onset weakness/numbness, LE pain and/or swelling.    Remaining ROS negative       PHYSICAL EXAM:    General: Comfortable appearing, in NAD   HEENT: NC/AT; PERRL, anicteric sclera; MMM. No tongue fasiculations  Neck: supple  Cardiovascular: +S1/S2, RRR  Respiratory: CTA B/L; no W/R/R  Gastrointestinal: soft, NT/ND; +BSx4  Extremities: WWP; no edema, clubbing or cyanosis  Vascular: 2+ radial, DP/PT pulses B/L  Neurological: AAOx3; no focal deficits. No tremor. CIWA 0  Psychiatric: anxious but pleasant mood and affect  Dermatologic: no appreciable wounds or damage to the skin      VITAL SIGNS:  Vital Signs Last 24 Hrs  T(C): 37.1 (02 Jul 2021 12:38), Max: 37.6 (01 Jul 2021 17:15)  T(F): 98.8 (02 Jul 2021 12:38), Max: 99.6 (01 Jul 2021 17:15)  HR: 94 (02 Jul 2021 12:38) (88 - 113)  BP: 112/79 (02 Jul 2021 12:38) (99/74 - 129/87)  BP(mean): 90 (01 Jul 2021 18:19) (90 - 90)  RR: 18 (02 Jul 2021 12:38) (18 - 20)  SpO2: 97% (02 Jul 2021 12:38) (95% - 97%)      MEDICATIONS:  MEDICATIONS  (STANDING):  chlordiazePOXIDE 25 milliGRAM(s) Oral every 8 hours  enoxaparin Injectable 40 milliGRAM(s) SubCutaneous every 24 hours  folic acid 1 milliGRAM(s) Oral daily  lactated ringers. 1000 milliLiter(s) (150 mL/Hr) IV Continuous <Continuous>  multivitamin 1 Tablet(s) Oral every 24 hours  pantoprazole    Tablet 40 milliGRAM(s) Oral every 12 hours  sucralfate suspension 1 Gram(s) Oral four times a day  thiamine 100 milliGRAM(s) Oral every 24 hours    MEDICATIONS  (PRN):  acetaminophen   Tablet .. 650 milliGRAM(s) Oral every 6 hours PRN Temp greater or equal to 38C (100.4F)  LORazepam   Injectable 2 milliGRAM(s) IV Push every 1 hour PRN CIWA-Ar score 8 or greater      ALLERGIES:  Allergies    No Known Allergies    Intolerances        LABS:                        10.7   5.57  )-----------( 178      ( 02 Jul 2021 06:49 )             34.2     07-02    136  |  93<L>  |  3<L>  ----------------------------<  146<H>  3.4<L>   |  21<L>  |  0.42<L>    Ca    7.7<L>      02 Jul 2021 06:49  Phos  2.0     07-02  Mg     1.8     07-02          CAPILLARY BLOOD GLUCOSE      POCT Blood Glucose.: 112 mg/dL (30 Jun 2021 17:33)      RADIOLOGY & ADDITIONAL TESTS: Reviewed.

## 2021-07-02 NOTE — PROGRESS NOTE ADULT - PROBLEM SELECTOR PLAN 6
.7 on arrival with Hgb 9.3  - No active signs of bleed and no repeated hematemesis  - Maintain active T & S  -patient reports has history of pernicious anemia  -B12 elevated and folate wnl  -c/w folic acid, thiamine, and multivitamin

## 2021-07-02 NOTE — CONSULT NOTE ADULT - SUBJECTIVE AND OBJECTIVE BOX
46F PMH etoh use d/o, anxiety/depression p/w n/v/hematemesis found with pancreatitis and etoh withdrawal. Cardiology consulted for prolonged QTc. EKG nsr, borderline inferior Q waves, /QTc 539 (Bazett). Denies psychiatric medications. K range 2.8-3.5, Mag range 1.5-2+. Denies CP/SOB/palpitations. No longer vomiting. Cardiologist: Dr. Gomes (sees for atypical CP and hx of palpitions).      HPI:  45 y/o Female with PMHx chronic alcoholism, anxiety, depression presenting with nausea and vomiting roughly every 2 hours since Saturday. She has a history of severe alcohol abuse for which she drinks 0.5-1.0 L of Vodka per day. During this time span she has been unable to keep up with PO liquids or foods, no food since saturday. Reports to vomiting some blood yesterday, but none today, most recent episode of emesis at 8:30 pm 6/29, still with residual nausea. Reports sensation of something stuck in the back of her throat.  Endorses intermittent abdominal pain, can radiate to her back. Denies diarrhea and ROS otherwise negative. According to the  while at [facility], she has undergone alcohol detoxication many times in the past, but never went to rehab. The patient denies history of alcohol withdrawal related seizures or ICU stays. Also denies history of pancreatitis.     ED Course at [facility]  Vitals: 97.9F oral, , /80, satting 96% on RA, RR 16  Labs: notable for Hgb 10.9, .4, K 2.9, HCO3 9, Mag 1.4, calcium 8.3 -> 6.7, T. bili 1.3 (direct 0.5), anion gap 35, AST 83/ALT 46, eGFR 52, lipase > 1500, lactate 5.8 -> 1.4, BAC 41, trop I negative; UA notable for >80 ketones, protein 30, mod blood, glucose 500   Imaging: CT abd pelvis w/ IV - Interstitial edematous pancreatitis. Severe hepatic steatosis.  Management: CTX 1g, zosyn 3.375g x1, tylenol 650mg rectal x1, ativan 1mg x1, ativan 2mg x1, reglan 10mg IVP x1, zofran 4mg IVP x1, pantoprazole 80mg IVP x1, Mag 2g, KCl 10mEq x3, thiamine 100 mg IVP x1, 2L LR bolus + 2L D5NS (30 Jun 2021 05:06)    PAST MEDICAL & SURGICAL HISTORY:  Anxiety    Depression    Alcohol abuse    Iron deficiency anemia, unspecified iron deficiency anemia type    Liver fatty degeneration    No significant past surgical history        ALLERGIES/INTOLERANCES:  No Known Allergies    HOME MEDICATIONS:    INPATIENT MEDICATIONS:    enoxaparin Injectable 40 milliGRAM(s) SubCutaneous every 24 hours    acetaminophen   Tablet .. 650 milliGRAM(s) Oral every 6 hours PRN  chlordiazePOXIDE 25 milliGRAM(s) Oral every 8 hours  folic acid 1 milliGRAM(s) Oral daily  lactated ringers. 1000 milliLiter(s) IV Continuous <Continuous>  LORazepam   Injectable 2 milliGRAM(s) IV Push every 1 hour PRN  multivitamin 1 Tablet(s) Oral every 24 hours  pantoprazole    Tablet 40 milliGRAM(s) Oral every 12 hours  sucralfate suspension 1 Gram(s) Oral four times a day  thiamine 100 milliGRAM(s) Oral every 24 hours      REVIEW OF SYSTEMS:    CONSTITUTIONAL: No weakness, F/C, wt loss/gain  EYES: No visual changes/disturbances  ENMT: No dry mouth, no vertigo  NECK: No pain or stiffness  RESPIRATORY: No cough, wheezing, hemoptysis; No shortness of breath  CARDIOVASCULAR: No chest pain, palpitations, lightheadedness/dizziness, LOC, or leg swelling  GASTROINTESTINAL: No abdominal or epigastric pain. No N/V/D/C. No melena, hematochezia, or hematemesis.  GENITOURINARY: No dysuria, increased frequency, hematuria, or incontinence  NEUROLOGICAL: No lightheadedness/dizziness, LOC, headaches, numbness or weakness  MUSCULOSKELETAL: No joint pain or swelling; No muscle, back, or extremity pain  SKIN: No itching, burning, rashes, or lesions   ENDOCRINE: No heat or cold intolerance; No hair loss  HEME/LYMPH: No easy bruising, or bleeding gums  PSYCHIATRIC: No depression, anxiety, mood swings, or difficulty sleeping    [ ] All other review of systems are negative unless indicated above.  [ ] Unable to obtain due to:    PHYSICAL EXAM:    T(C): 36.5 (07-01-21 @ 20:35), Max: 38.1 (07-01-21 @ 15:49)  HR: 92 (07-01-21 @ 20:35) (92 - 122)  BP: 106/74 (07-01-21 @ 20:35) (99/74 - 134/74)  RR: 20 (07-01-21 @ 20:35) (18 - 20)  SpO2: 95% (07-01-21 @ 20:35) (90% - 98%)  Wt(kg): --    I&O's Summary    30 Jun 2021 07:01  -  01 Jul 2021 07:00  --------------------------------------------------------  IN: 1200 mL / OUT: 3801 mL / NET: -2601 mL    01 Jul 2021 07:01  -  02 Jul 2021 00:02  --------------------------------------------------------  IN: 266.6 mL / OUT: 600 mL / NET: -333.4 mL    GENERAL: NAD, well-developed  HEAD:  NCAT  HEENT: EOMI, PERRL, conjunctiva and sclera clear; moist mucosa; Neck supple, No JVD  CARDIOVASCULAR: RRR, normal S1 S2, no M/R/G, no JVD, neg HJR, nondisplaced PMI, no LE edema  RESPIRATORY: Lungs clear to auscultation b/l, no C/W/R  GASTROINTESTINAL: +BS, soft, non-distended, non-tender, no HSM  VASCULAR: Peripheral pulses palpable 2+ bilaterally  EXTREMITIES: Warm. No clubbing, cyanosis or edema. Normal range of motion.  SKIN: No rashes, lesions, ecchymoses, or cyanosis  NEURO: AAOx3, no focal deficits  PSYCH: Nl behavior, nl affect  LINES:    TELEMETRY: 	      ECG:  	  	  LABS:                        9.6    4.45  )-----------( 129      ( 01 Jul 2021 06:39 )             29.0     07-01    135  |  94<L>  |  2<L>  ----------------------------<  114<H>  3.5   |  23  |  0.42<L>    Ca    7.6<L>      01 Jul 2021 15:54  Phos  1.5     07-01  Mg     1.9     07-01    TPro  6.5  /  Alb  3.2<L>  /  TBili  1.1  /  DBili  x   /  AST  65<H>  /  ALT  33  /  AlkPhos  43  06-30      A/P: 46F PMH etoh use d/o, anxiety/depression p/w n/v/hematemesis found with pancreatitis and etoh withdrawal. Cardiology consulted for prolonged QTc (539 by Bazett) in setting of electrolyte derangements 2/2 pancreatitis and vomiting.    #QTc prolongation  - K>4, Mg>2  - daily ECG; repeat after normalized electrolytes  - avoid QTc prolonging agents  Prelim evaluation by on call cardiology fellow. Recommendations final pending attending attestation  Augusto Toscano MD PGY5   46F PMH etoh use d/o, anxiety/depression p/w n/v/hematemesis found with pancreatitis and etoh withdrawal. Cardiology consulted for prolonged QTc. EKG nsr, borderline inferior Q waves, /QTc 539 (Bazett). Denies psychiatric medications. K range 2.8-3.5, Mag range 1.5-2+. Denies CP/SOB/palpitations. No longer vomiting. Cardiologist: Dr. Gomes (sees for atypical CP and hx of palpitions).      HPI:  45 y/o Female with PMHx chronic alcoholism, anxiety, depression presenting with nausea and vomiting roughly every 2 hours since Saturday. She has a history of severe alcohol abuse for which she drinks 0.5-1.0 L of Vodka per day. During this time span she has been unable to keep up with PO liquids or foods, no food since saturday. Reports to vomiting some blood yesterday, but none today, most recent episode of emesis at 8:30 pm 6/29, still with residual nausea. Reports sensation of something stuck in the back of her throat.  Endorses intermittent abdominal pain, can radiate to her back. Denies diarrhea and ROS otherwise negative. According to the  while at [facility], she has undergone alcohol detoxication many times in the past, but never went to rehab. The patient denies history of alcohol withdrawal related seizures or ICU stays. Also denies history of pancreatitis.     ED Course at [facility]  Vitals: 97.9F oral, , /80, satting 96% on RA, RR 16  Labs: notable for Hgb 10.9, .4, K 2.9, HCO3 9, Mag 1.4, calcium 8.3 -> 6.7, T. bili 1.3 (direct 0.5), anion gap 35, AST 83/ALT 46, eGFR 52, lipase > 1500, lactate 5.8 -> 1.4, BAC 41, trop I negative; UA notable for >80 ketones, protein 30, mod blood, glucose 500   Imaging: CT abd pelvis w/ IV - Interstitial edematous pancreatitis. Severe hepatic steatosis.  Management: CTX 1g, zosyn 3.375g x1, tylenol 650mg rectal x1, ativan 1mg x1, ativan 2mg x1, reglan 10mg IVP x1, zofran 4mg IVP x1, pantoprazole 80mg IVP x1, Mag 2g, KCl 10mEq x3, thiamine 100 mg IVP x1, 2L LR bolus + 2L D5NS (30 Jun 2021 05:06)    PAST MEDICAL & SURGICAL HISTORY:  Anxiety    Depression    Alcohol abuse    Iron deficiency anemia, unspecified iron deficiency anemia type    Liver fatty degeneration    No significant past surgical history        ALLERGIES/INTOLERANCES:  No Known Allergies    HOME MEDICATIONS:    INPATIENT MEDICATIONS:    enoxaparin Injectable 40 milliGRAM(s) SubCutaneous every 24 hours    acetaminophen   Tablet .. 650 milliGRAM(s) Oral every 6 hours PRN  chlordiazePOXIDE 25 milliGRAM(s) Oral every 8 hours  folic acid 1 milliGRAM(s) Oral daily  lactated ringers. 1000 milliLiter(s) IV Continuous <Continuous>  LORazepam   Injectable 2 milliGRAM(s) IV Push every 1 hour PRN  multivitamin 1 Tablet(s) Oral every 24 hours  pantoprazole    Tablet 40 milliGRAM(s) Oral every 12 hours  sucralfate suspension 1 Gram(s) Oral four times a day  thiamine 100 milliGRAM(s) Oral every 24 hours      REVIEW OF SYSTEMS:    CONSTITUTIONAL: No weakness, F/C, wt loss/gain  EYES: No visual changes/disturbances  ENMT: No dry mouth, no vertigo  NECK: No pain or stiffness  RESPIRATORY: No cough, wheezing, hemoptysis; No shortness of breath  CARDIOVASCULAR: No chest pain, palpitations, lightheadedness/dizziness, LOC, or leg swelling  GASTROINTESTINAL: No abdominal or epigastric pain. No N/V/D/C. No melena, hematochezia, or hematemesis.  GENITOURINARY: No dysuria, increased frequency, hematuria, or incontinence  NEUROLOGICAL: No lightheadedness/dizziness, LOC, headaches, numbness or weakness  MUSCULOSKELETAL: No joint pain or swelling; No muscle, back, or extremity pain  SKIN: No itching, burning, rashes, or lesions   ENDOCRINE: No heat or cold intolerance; No hair loss  HEME/LYMPH: No easy bruising, or bleeding gums  PSYCHIATRIC: No depression, anxiety, mood swings, or difficulty sleeping    [ ] All other review of systems are negative unless indicated above.  [ ] Unable to obtain due to:    PHYSICAL EXAM:    T(C): 36.5 (07-01-21 @ 20:35), Max: 38.1 (07-01-21 @ 15:49)  HR: 92 (07-01-21 @ 20:35) (92 - 122)  BP: 106/74 (07-01-21 @ 20:35) (99/74 - 134/74)  RR: 20 (07-01-21 @ 20:35) (18 - 20)  SpO2: 95% (07-01-21 @ 20:35) (90% - 98%)  Wt(kg): --    I&O's Summary    30 Jun 2021 07:01  -  01 Jul 2021 07:00  --------------------------------------------------------  IN: 1200 mL / OUT: 3801 mL / NET: -2601 mL    01 Jul 2021 07:01  -  02 Jul 2021 00:02  --------------------------------------------------------  IN: 266.6 mL / OUT: 600 mL / NET: -333.4 mL    NAD  RRR no m/r/g  CTAB; decreased at bases  No LE edema, wwp    TELEMETRY: 	      ECG:  	  	  LABS:                        9.6    4.45  )-----------( 129      ( 01 Jul 2021 06:39 )             29.0     07-01    135  |  94<L>  |  2<L>  ----------------------------<  114<H>  3.5   |  23  |  0.42<L>    Ca    7.6<L>      01 Jul 2021 15:54  Phos  1.5     07-01  Mg     1.9     07-01    TPro  6.5  /  Alb  3.2<L>  /  TBili  1.1  /  DBili  x   /  AST  65<H>  /  ALT  33  /  AlkPhos  43  06-30      A/P: 46F PMH etoh use d/o, anxiety/depression p/w n/v/hematemesis found with pancreatitis and etoh withdrawal. Cardiology consulted for prolonged QTc (539 by Bazett) in setting of electrolyte derangements 2/2 pancreatitis and vomiting.    #QTc prolongation  - K>4, Mg>2  - daily ECG; repeat after normalized electrolytes  - avoid QTc prolonging agents  Prelim evaluation by on call cardiology fellow. Recommendations final pending attending attestation  Augusto Toscano MD PGY5

## 2021-07-02 NOTE — DIETITIAN INITIAL EVALUATION ADULT. - PROBLEM SELECTOR PLAN 2
On admission admits to intermittent abdominal pain with radiation to her back. Denies diarrhea and ROS otherwise negative. [facility] labs notable for lipase > 1500 with CT abd pelvis showing interstitial edematous pancreatitis.  - s/p 2L LR bolus + 2L D5NS in [facility]   - starting LR @ 200cc/hr x10 hours  - pain control  - f/u lipid profile  - f/u RUQ ultrasound given mild transaminitis and CT findings notable for hepatic steatosis

## 2021-07-02 NOTE — CHART NOTE - NSCHARTNOTEFT_GEN_A_CORE
Ms. Vivar has elected to leave the hospital against medical advice. She was explained the risks of leaving the hospital including possibly going into alcohol withdrawal, risk of injury, and death. She is of sound mind and has capacity to make this decision. Ms. Vivar has elected to leave the hospital against medical advice. She was explained the risks of leaving the hospital including possibly going into alcohol withdrawal, risk of injury, and death. She is of sound mind and has capacity to make this decision. She has voiced her understanding of the risks of leaving.

## 2021-07-02 NOTE — PROGRESS NOTE ADULT - PROBLEM SELECTOR PLAN 5
Temperature of 100.5 on 7/1 attributed to reasons as above. Full fever workup not done due to low suspicion for infectious process  - Culture from 6/28 NGTD  - Low threshold to repeat blood culture and imaging for pancreatitis complications  -patient afebrile today

## 2021-07-02 NOTE — PROGRESS NOTE ADULT - PROBLEM SELECTOR PLAN 2
Patient has advanced diet to solid foods and denies N/V since admission. Denies any abdominal pain and nontender to palpation on repeat exam.  - Dry-appearing on exam. c/w LR maintenance @ 150 mL/hour

## 2021-07-02 NOTE — DIETITIAN INITIAL EVALUATION ADULT. - ADD RECOMMEND
1. Continue regular diet 2. Monitor %PO intake 3. BM and pain regimen per team 4. Monitor labs: BMP, lytes, replete prn

## 2021-07-03 LAB
FOLATE SERPL-MCNC: >20 NG/ML — SIGNIFICANT CHANGE UP
VIT B12 SERPL-MCNC: 1195 PG/ML — SIGNIFICANT CHANGE UP (ref 232–1245)

## 2021-07-05 LAB
CULTURE RESULTS: SIGNIFICANT CHANGE UP
CULTURE RESULTS: SIGNIFICANT CHANGE UP
SPECIMEN SOURCE: SIGNIFICANT CHANGE UP
SPECIMEN SOURCE: SIGNIFICANT CHANGE UP

## 2021-07-08 PROBLEM — K76.0 FATTY (CHANGE OF) LIVER, NOT ELSEWHERE CLASSIFIED: Chronic | Status: ACTIVE | Noted: 2021-06-30

## 2021-07-08 PROBLEM — D50.9 IRON DEFICIENCY ANEMIA, UNSPECIFIED: Chronic | Status: ACTIVE | Noted: 2021-06-30

## 2021-07-09 ENCOUNTER — NON-APPOINTMENT (OUTPATIENT)
Age: 46
End: 2021-07-09

## 2021-07-09 ENCOUNTER — EMERGENCY (EMERGENCY)
Facility: HOSPITAL | Age: 46
LOS: 1 days | Discharge: HOME | End: 2021-07-09
Attending: EMERGENCY MEDICINE | Admitting: EMERGENCY MEDICINE
Payer: COMMERCIAL

## 2021-07-09 ENCOUNTER — APPOINTMENT (OUTPATIENT)
Dept: HEART AND VASCULAR | Facility: CLINIC | Age: 46
End: 2021-07-09
Payer: COMMERCIAL

## 2021-07-09 VITALS
TEMPERATURE: 99 F | WEIGHT: 136.91 LBS | OXYGEN SATURATION: 98 % | HEART RATE: 103 BPM | DIASTOLIC BLOOD PRESSURE: 72 MMHG | HEIGHT: 69 IN | SYSTOLIC BLOOD PRESSURE: 110 MMHG | RESPIRATION RATE: 18 BRPM

## 2021-07-09 VITALS
OXYGEN SATURATION: 98 % | WEIGHT: 138.06 LBS | TEMPERATURE: 98.1 F | DIASTOLIC BLOOD PRESSURE: 69 MMHG | HEART RATE: 102 BPM | SYSTOLIC BLOOD PRESSURE: 105 MMHG | HEIGHT: 69 IN | BODY MASS INDEX: 20.45 KG/M2

## 2021-07-09 DIAGNOSIS — F32.9 MAJOR DEPRESSIVE DISORDER, SINGLE EPISODE, UNSPECIFIED: ICD-10-CM

## 2021-07-09 DIAGNOSIS — R60.0 LOCALIZED EDEMA: ICD-10-CM

## 2021-07-09 DIAGNOSIS — Z87.898 PERSONAL HISTORY OF OTHER SPECIFIED CONDITIONS: ICD-10-CM

## 2021-07-09 DIAGNOSIS — K76.0 FATTY (CHANGE OF) LIVER, NOT ELSEWHERE CLASSIFIED: ICD-10-CM

## 2021-07-09 DIAGNOSIS — F41.9 ANXIETY DISORDER, UNSPECIFIED: ICD-10-CM

## 2021-07-09 DIAGNOSIS — D50.9 IRON DEFICIENCY ANEMIA, UNSPECIFIED: ICD-10-CM

## 2021-07-09 DIAGNOSIS — M79.671 PAIN IN RIGHT FOOT: ICD-10-CM

## 2021-07-09 LAB
ALBUMIN SERPL ELPH-MCNC: 3.5 G/DL — SIGNIFICANT CHANGE UP (ref 3.4–5)
ALP SERPL-CCNC: 52 U/L — SIGNIFICANT CHANGE UP (ref 40–120)
ALT FLD-CCNC: 43 U/L — HIGH (ref 12–42)
ANION GAP SERPL CALC-SCNC: 15 MMOL/L — SIGNIFICANT CHANGE UP (ref 9–16)
AST SERPL-CCNC: 54 U/L — HIGH (ref 15–37)
BASOPHILS # BLD AUTO: 0.05 K/UL — SIGNIFICANT CHANGE UP (ref 0–0.2)
BASOPHILS NFR BLD AUTO: 0.8 % — SIGNIFICANT CHANGE UP (ref 0–2)
BILIRUB SERPL-MCNC: 0.6 MG/DL — SIGNIFICANT CHANGE UP (ref 0.2–1.2)
BUN SERPL-MCNC: 9 MG/DL — SIGNIFICANT CHANGE UP (ref 7–23)
CALCIUM SERPL-MCNC: 9.3 MG/DL — SIGNIFICANT CHANGE UP (ref 8.5–10.5)
CHLORIDE SERPL-SCNC: 98 MMOL/L — SIGNIFICANT CHANGE UP (ref 96–108)
CO2 SERPL-SCNC: 27 MMOL/L — SIGNIFICANT CHANGE UP (ref 22–31)
CREAT SERPL-MCNC: 0.81 MG/DL — SIGNIFICANT CHANGE UP (ref 0.5–1.3)
EOSINOPHIL # BLD AUTO: 0.05 K/UL — SIGNIFICANT CHANGE UP (ref 0–0.5)
EOSINOPHIL NFR BLD AUTO: 0.8 % — SIGNIFICANT CHANGE UP (ref 0–6)
ETHANOL SERPL-MCNC: 27 MG/DL — HIGH
GLUCOSE SERPL-MCNC: 78 MG/DL — SIGNIFICANT CHANGE UP (ref 70–99)
HCT VFR BLD CALC: 25.5 % — LOW (ref 34.5–45)
HGB BLD-MCNC: 8.6 G/DL — LOW (ref 11.5–15.5)
IMM GRANULOCYTES NFR BLD AUTO: 0.3 % — SIGNIFICANT CHANGE UP (ref 0–1.5)
LYMPHOCYTES # BLD AUTO: 1.76 K/UL — SIGNIFICANT CHANGE UP (ref 1–3.3)
LYMPHOCYTES # BLD AUTO: 29.7 % — SIGNIFICANT CHANGE UP (ref 13–44)
MCHC RBC-ENTMCNC: 33.7 GM/DL — SIGNIFICANT CHANGE UP (ref 32–36)
MCHC RBC-ENTMCNC: 33.7 PG — SIGNIFICANT CHANGE UP (ref 27–34)
MCV RBC AUTO: 100 FL — SIGNIFICANT CHANGE UP (ref 80–100)
MONOCYTES # BLD AUTO: 0.61 K/UL — SIGNIFICANT CHANGE UP (ref 0–0.9)
MONOCYTES NFR BLD AUTO: 10.3 % — SIGNIFICANT CHANGE UP (ref 2–14)
NEUTROPHILS # BLD AUTO: 3.43 K/UL — SIGNIFICANT CHANGE UP (ref 1.8–7.4)
NEUTROPHILS NFR BLD AUTO: 58.1 % — SIGNIFICANT CHANGE UP (ref 43–77)
NRBC # BLD: 0 /100 WBCS — SIGNIFICANT CHANGE UP (ref 0–0)
PLATELET # BLD AUTO: 451 K/UL — HIGH (ref 150–400)
POTASSIUM SERPL-MCNC: 3 MMOL/L — LOW (ref 3.5–5.3)
POTASSIUM SERPL-SCNC: 3 MMOL/L — LOW (ref 3.5–5.3)
PROT SERPL-MCNC: 7.5 G/DL — SIGNIFICANT CHANGE UP (ref 6.4–8.2)
RBC # BLD: 2.55 M/UL — LOW (ref 3.8–5.2)
RBC # FLD: 16.4 % — HIGH (ref 10.3–14.5)
SODIUM SERPL-SCNC: 140 MMOL/L — SIGNIFICANT CHANGE UP (ref 132–145)
WBC # BLD: 5.92 K/UL — SIGNIFICANT CHANGE UP (ref 3.8–10.5)
WBC # FLD AUTO: 5.92 K/UL — SIGNIFICANT CHANGE UP (ref 3.8–10.5)

## 2021-07-09 PROCEDURE — 99213 OFFICE O/P EST LOW 20 MIN: CPT

## 2021-07-09 PROCEDURE — 93970 EXTREMITY STUDY: CPT | Mod: 26

## 2021-07-09 PROCEDURE — 93000 ELECTROCARDIOGRAM COMPLETE: CPT

## 2021-07-09 PROCEDURE — 99072 ADDL SUPL MATRL&STAF TM PHE: CPT

## 2021-07-09 PROCEDURE — 99284 EMERGENCY DEPT VISIT MOD MDM: CPT

## 2021-07-09 RX ORDER — POTASSIUM CHLORIDE 20 MEQ
40 PACKET (EA) ORAL ONCE
Refills: 0 | Status: COMPLETED | OUTPATIENT
Start: 2021-07-09 | End: 2021-07-09

## 2021-07-09 RX ADMIN — Medication 40 MILLIEQUIVALENT(S): at 17:18

## 2021-07-09 NOTE — DISCUSSION/SUMMARY
[FreeTextEntry1] : CP/palpitations Inclined towards a conservative follow up in this patient. We had a careful discussion regarding diet and exercise. Will be happy to re-evaluate.\par EKG section of the chart --- secondary to symptoms above an electrocardiogram also known as an EKG was performed.  Risks and benefits discussed with the patient. Patient was given time and privacy to changed into a gown. Shortly after, standard 10 leads were applied and a midByRead system was used to perform the study. The results were subsequently reviewed by attending physician and discussed with the patient. The study showed a normal sinus rhythm and no ST-T suggestive of ischemia. Order for the EKG was placed in the chart. The results were documented. Billing submitted. Emotional support provided.\par TANG check echocardiogram if possible

## 2021-07-09 NOTE — REASON FOR VISIT
[Symptom and Test Evaluation] : symptom and test evaluation [FreeTextEntry1] : Patient arrives for a follow up an hour late. She was recently hospitalized again for pancreatitis.  She rescheduled had echo and follow up multiple times. She had a few visiits to ER secondary to psych and substance abuse issues. Edema noted b/l.

## 2021-07-09 NOTE — ED PROVIDER NOTE - CLINICAL SUMMARY MEDICAL DECISION MAKING FREE TEXT BOX
45 y/o F presenting with 5 days of bilateral feet swelling and pain. Plan for blood work and US doppler to r/o blood clots. Will reassess clinically after results have been obtained.

## 2021-07-09 NOTE — ED PROVIDER NOTE - OBJECTIVE STATEMENT
45 y/o F with PMHx of alcoholism, venous regurgitation disease, and "liver disease" presents to the ED for bilateral feet pain, tingling, and swelling. Pt reports she was recently hospitalized and has been immobilized during the duration of her admission. Over the last 5 days, she noted increased swelling and "poking" pains to the bilateral feet. Pt states she is an active drinker and is trying to get help to stop her chronic alcohol use. Pt denies fevers, chills, CP, SOB, or trauma to the area.         hx alcoholism  trying to stop drinming.   liver disesae.

## 2021-07-09 NOTE — ED ADULT NURSE NOTE - NS ED NURSE LEVEL OF CONSCIOUSNESS MENTAL STATUS
Left message for pt, asked pt to let me know how she is feeling and to make an appt with Dr Beavers   Awake/Alert/Cooperative

## 2021-07-09 NOTE — ED PROVIDER NOTE - PATIENT PORTAL LINK FT
You can access the FollowMyHealth Patient Portal offered by [facility] by registering at the following website: [email or web address]. By joining [facility]’s FollowMyHealth portal, you will also be able to view your health information using other applications (apps) compatible with our system.

## 2021-07-12 DIAGNOSIS — F41.9 ANXIETY DISORDER, UNSPECIFIED: ICD-10-CM

## 2021-07-12 DIAGNOSIS — E83.42 HYPOMAGNESEMIA: ICD-10-CM

## 2021-07-12 DIAGNOSIS — Z20.822 CONTACT WITH AND (SUSPECTED) EXPOSURE TO COVID-19: ICD-10-CM

## 2021-07-12 DIAGNOSIS — K76.0 FATTY (CHANGE OF) LIVER, NOT ELSEWHERE CLASSIFIED: ICD-10-CM

## 2021-07-12 DIAGNOSIS — E87.6 HYPOKALEMIA: ICD-10-CM

## 2021-07-12 DIAGNOSIS — K92.0 HEMATEMESIS: ICD-10-CM

## 2021-07-12 DIAGNOSIS — F10.239 ALCOHOL DEPENDENCE WITH WITHDRAWAL, UNSPECIFIED: ICD-10-CM

## 2021-07-12 DIAGNOSIS — F32.9 MAJOR DEPRESSIVE DISORDER, SINGLE EPISODE, UNSPECIFIED: ICD-10-CM

## 2021-07-12 DIAGNOSIS — E86.0 DEHYDRATION: ICD-10-CM

## 2021-07-12 DIAGNOSIS — K85.20 ALCOHOL INDUCED ACUTE PANCREATITIS WITHOUT NECROSIS OR INFECTION: ICD-10-CM

## 2021-07-12 DIAGNOSIS — R94.31 ABNORMAL ELECTROCARDIOGRAM [ECG] [EKG]: ICD-10-CM

## 2021-07-12 DIAGNOSIS — D53.9 NUTRITIONAL ANEMIA, UNSPECIFIED: ICD-10-CM

## 2021-07-12 DIAGNOSIS — E83.39 OTHER DISORDERS OF PHOSPHORUS METABOLISM: ICD-10-CM

## 2021-07-12 DIAGNOSIS — D50.9 IRON DEFICIENCY ANEMIA, UNSPECIFIED: ICD-10-CM

## 2021-07-12 DIAGNOSIS — F42.9 OBSESSIVE-COMPULSIVE DISORDER, UNSPECIFIED: ICD-10-CM

## 2021-07-12 DIAGNOSIS — R05 COUGH: ICD-10-CM

## 2021-07-12 DIAGNOSIS — E87.2 ACIDOSIS: ICD-10-CM

## 2021-07-12 DIAGNOSIS — R73.9 HYPERGLYCEMIA, UNSPECIFIED: ICD-10-CM

## 2021-07-12 DIAGNOSIS — K70.9 ALCOHOLIC LIVER DISEASE, UNSPECIFIED: ICD-10-CM

## 2021-07-12 DIAGNOSIS — Z63.4 DISAPPEARANCE AND DEATH OF FAMILY MEMBER: ICD-10-CM

## 2021-07-12 SDOH — SOCIAL STABILITY - SOCIAL INSECURITY: DISSAPEARANCE AND DEATH OF FAMILY MEMBER: Z63.4

## 2021-08-11 ENCOUNTER — APPOINTMENT (OUTPATIENT)
Dept: NEUROLOGY | Facility: CLINIC | Age: 46
End: 2021-08-11

## 2021-08-16 ENCOUNTER — APPOINTMENT (OUTPATIENT)
Dept: HEART AND VASCULAR | Facility: CLINIC | Age: 46
End: 2021-08-16
Payer: COMMERCIAL

## 2021-08-16 VITALS
OXYGEN SATURATION: 100 % | HEIGHT: 69 IN | WEIGHT: 128.44 LBS | SYSTOLIC BLOOD PRESSURE: 93 MMHG | BODY MASS INDEX: 19.02 KG/M2 | DIASTOLIC BLOOD PRESSURE: 62 MMHG | HEART RATE: 97 BPM | TEMPERATURE: 98.2 F

## 2021-08-16 DIAGNOSIS — R00.2 PALPITATIONS: ICD-10-CM

## 2021-08-16 DIAGNOSIS — I83.93 ASYMPTOMATIC VARICOSE VEINS OF BILATERAL LOWER EXTREMITIES: ICD-10-CM

## 2021-08-16 PROCEDURE — 93306 TTE W/DOPPLER COMPLETE: CPT

## 2021-08-16 PROCEDURE — 99214 OFFICE O/P EST MOD 30 MIN: CPT | Mod: 25

## 2021-08-16 NOTE — REASON FOR VISIT
[Symptom and Test Evaluation] : symptom and test evaluation [FreeTextEntry1] : Patient arrives for a follow up an hour late. She was recently hospitalized again for pancreatitis.  She rescheduled had echo and follow up multiple times. She had a few visiits to ER secondary to psych and substance abuse issues. Edema noted b/l. s/p echo today with preserved LV function. We are discussing results and further care.

## 2021-08-16 NOTE — DISCUSSION/SUMMARY
[FreeTextEntry1] : CP/palps/cote echocardiogram reviewed. Inclined towards a conservative follow up in this patient. We had a careful discussion regarding diet and exercise. Will be happy to re-evaluate.\par Recommend addressing psych and substance abuse issue prior to additional cardiac intervention.

## 2021-09-28 NOTE — ED ADULT NURSE NOTE - PRO INTERPRETER NEED 2
Patient's mother returned call and Inf[facility] infusion dates were reviewed per request (week 0, 2, 6 and then every 8 weeks).  Patient continues on Prednisone wean and daily Omeprazole.  Patient doing well per report with increased appetite and weight gain.  Arnol Eckert RN   English

## 2021-12-14 ENCOUNTER — APPOINTMENT (OUTPATIENT)
Dept: HEART AND VASCULAR | Facility: CLINIC | Age: 46
End: 2021-12-14

## 2021-12-22 ENCOUNTER — APPOINTMENT (OUTPATIENT)
Dept: HEART AND VASCULAR | Facility: CLINIC | Age: 46
End: 2021-12-22

## 2021-12-26 NOTE — PROGRESS NOTE ADULT - PROBLEM/PLAN-1
TELEMETRY
DISPLAY PLAN FREE TEXT

## 2022-01-18 ENCOUNTER — INPATIENT (INPATIENT)
Facility: HOSPITAL | Age: 47
LOS: 7 days | Discharge: HOME | DRG: 380 | End: 2022-01-26
Attending: INTERNAL MEDICINE | Admitting: INTERNAL MEDICINE
Payer: COMMERCIAL

## 2022-01-18 VITALS
DIASTOLIC BLOOD PRESSURE: 40 MMHG | RESPIRATION RATE: 18 BRPM | HEIGHT: 69 IN | SYSTOLIC BLOOD PRESSURE: 80 MMHG | OXYGEN SATURATION: 98 % | HEART RATE: 89 BPM | TEMPERATURE: 90 F

## 2022-01-18 DIAGNOSIS — K92.0 HEMATEMESIS: ICD-10-CM

## 2022-01-18 DIAGNOSIS — Z00.00 ENCOUNTER FOR GENERAL ADULT MEDICAL EXAMINATION WITHOUT ABNORMAL FINDINGS: ICD-10-CM

## 2022-01-18 DIAGNOSIS — F41.9 ANXIETY DISORDER, UNSPECIFIED: ICD-10-CM

## 2022-01-18 DIAGNOSIS — D61.818 OTHER PANCYTOPENIA: ICD-10-CM

## 2022-01-18 DIAGNOSIS — D53.9 NUTRITIONAL ANEMIA, UNSPECIFIED: ICD-10-CM

## 2022-01-18 DIAGNOSIS — F10.239 ALCOHOL DEPENDENCE WITH WITHDRAWAL, UNSPECIFIED: ICD-10-CM

## 2022-01-18 DIAGNOSIS — K85.90 ACUTE PANCREATITIS WITHOUT NECROSIS OR INFECTION, UNSPECIFIED: ICD-10-CM

## 2022-01-18 DIAGNOSIS — K76.0 FATTY (CHANGE OF) LIVER, NOT ELSEWHERE CLASSIFIED: ICD-10-CM

## 2022-01-18 DIAGNOSIS — R74.01 ELEVATION OF LEVELS OF LIVER TRANSAMINASE LEVELS: ICD-10-CM

## 2022-01-18 DIAGNOSIS — E87.6 HYPOKALEMIA: ICD-10-CM

## 2022-01-18 LAB
ALBUMIN SERPL ELPH-MCNC: 2.1 G/DL — LOW (ref 3.4–5)
ALBUMIN SERPL ELPH-MCNC: 3 G/DL — LOW (ref 3.4–5)
ALBUMIN SERPL ELPH-MCNC: 3.2 G/DL — LOW (ref 3.3–5)
ALBUMIN SERPL ELPH-MCNC: 3.3 G/DL — SIGNIFICANT CHANGE UP (ref 3.3–5)
ALBUMIN SERPL ELPH-MCNC: 4 G/DL — SIGNIFICANT CHANGE UP (ref 3.4–5)
ALP SERPL-CCNC: 107 U/L — SIGNIFICANT CHANGE UP (ref 40–120)
ALP SERPL-CCNC: 155 U/L — HIGH (ref 40–120)
ALP SERPL-CCNC: 77 U/L — SIGNIFICANT CHANGE UP (ref 40–120)
ALP SERPL-CCNC: 91 U/L — SIGNIFICANT CHANGE UP (ref 40–120)
ALP SERPL-CCNC: 92 U/L — SIGNIFICANT CHANGE UP (ref 40–120)
ALT FLD-CCNC: 140 U/L — HIGH (ref 12–42)
ALT FLD-CCNC: 159 U/L — HIGH (ref 10–45)
ALT FLD-CCNC: 160 U/L — HIGH (ref 10–45)
ALT FLD-CCNC: 193 U/L — HIGH (ref 12–42)
ALT FLD-CCNC: 276 U/L — HIGH (ref 12–42)
AMPHET UR-MCNC: NEGATIVE — SIGNIFICANT CHANGE UP
ANION GAP SERPL CALC-SCNC: 22 MMOL/L — HIGH (ref 9–16)
ANION GAP SERPL CALC-SCNC: 27 MMOL/L — HIGH (ref 5–17)
ANION GAP SERPL CALC-SCNC: 28 MMOL/L — HIGH (ref 9–16)
ANION GAP SERPL CALC-SCNC: 29 MMOL/L — HIGH (ref 5–17)
ANION GAP SERPL CALC-SCNC: 40 MMOL/L — HIGH (ref 9–16)
APAP SERPL-MCNC: <5 UG/ML — LOW (ref 10–30)
APPEARANCE UR: CLEAR — SIGNIFICANT CHANGE UP
APTT BLD: 26.3 SEC — LOW (ref 27.5–35.5)
APTT BLD: 33 SEC — SIGNIFICANT CHANGE UP (ref 27.5–35.5)
AST SERPL-CCNC: 431 U/L — HIGH (ref 10–40)
AST SERPL-CCNC: 443 U/L — HIGH (ref 15–37)
AST SERPL-CCNC: 454 U/L — HIGH (ref 10–40)
AST SERPL-CCNC: 605 U/L — HIGH (ref 15–37)
AST SERPL-CCNC: 864 U/L — HIGH (ref 15–37)
BACTERIA # UR AUTO: PRESENT /HPF
BARBITURATES UR SCN-MCNC: NEGATIVE — SIGNIFICANT CHANGE UP
BASOPHILS # BLD AUTO: 0 K/UL — SIGNIFICANT CHANGE UP (ref 0–0.2)
BASOPHILS # BLD AUTO: 0.01 K/UL — SIGNIFICANT CHANGE UP (ref 0–0.2)
BASOPHILS # BLD AUTO: 0.03 K/UL — SIGNIFICANT CHANGE UP (ref 0–0.2)
BASOPHILS NFR BLD AUTO: 0 % — SIGNIFICANT CHANGE UP (ref 0–2)
BASOPHILS NFR BLD AUTO: 0.3 % — SIGNIFICANT CHANGE UP (ref 0–2)
BASOPHILS NFR BLD AUTO: 0.6 % — SIGNIFICANT CHANGE UP (ref 0–2)
BENZODIAZ UR-MCNC: NEGATIVE — SIGNIFICANT CHANGE UP
BILIRUB DIRECT SERPL-MCNC: 3.2 MG/DL — HIGH (ref 0–0.3)
BILIRUB DIRECT SERPL-MCNC: 5.3 MG/DL — HIGH (ref 0–0.3)
BILIRUB INDIRECT FLD-MCNC: 1 MG/DL — SIGNIFICANT CHANGE UP (ref 0.2–1)
BILIRUB INDIRECT FLD-MCNC: 1.9 MG/DL — HIGH (ref 0.2–1)
BILIRUB SERPL-MCNC: 4.2 MG/DL — HIGH (ref 0.2–1.2)
BILIRUB SERPL-MCNC: 4.8 MG/DL — HIGH (ref 0.2–1.2)
BILIRUB SERPL-MCNC: 5.7 MG/DL — HIGH (ref 0.2–1.2)
BILIRUB SERPL-MCNC: 6.7 MG/DL — HIGH (ref 0.2–1.2)
BILIRUB SERPL-MCNC: 7.2 MG/DL — HIGH (ref 0.2–1.2)
BILIRUB UR-MCNC: ABNORMAL
BLD GP AB SCN SERPL QL: NEGATIVE — SIGNIFICANT CHANGE UP
BLD GP AB SCN SERPL QL: NEGATIVE — SIGNIFICANT CHANGE UP
BUN SERPL-MCNC: 12 MG/DL — SIGNIFICANT CHANGE UP (ref 7–23)
BUN SERPL-MCNC: 14 MG/DL — SIGNIFICANT CHANGE UP (ref 7–23)
BUN SERPL-MCNC: 15 MG/DL — SIGNIFICANT CHANGE UP (ref 7–23)
BUN SERPL-MCNC: 15 MG/DL — SIGNIFICANT CHANGE UP (ref 7–23)
BUN SERPL-MCNC: 17 MG/DL — SIGNIFICANT CHANGE UP (ref 7–23)
CALCIUM SERPL-MCNC: 5.9 MG/DL — CRITICAL LOW (ref 8.5–10.5)
CALCIUM SERPL-MCNC: 7.9 MG/DL — LOW (ref 8.4–10.5)
CALCIUM SERPL-MCNC: 8 MG/DL — LOW (ref 8.5–10.5)
CALCIUM SERPL-MCNC: 8.1 MG/DL — LOW (ref 8.4–10.5)
CALCIUM SERPL-MCNC: 9.1 MG/DL — SIGNIFICANT CHANGE UP (ref 8.5–10.5)
CHLORIDE SERPL-SCNC: 86 MMOL/L — LOW (ref 96–108)
CHLORIDE SERPL-SCNC: 94 MMOL/L — LOW (ref 96–108)
CHLORIDE SERPL-SCNC: 94 MMOL/L — LOW (ref 96–108)
CHLORIDE SERPL-SCNC: 95 MMOL/L — LOW (ref 96–108)
CHLORIDE SERPL-SCNC: 97 MMOL/L — SIGNIFICANT CHANGE UP (ref 96–108)
CK SERPL-CCNC: 369 U/L — HIGH (ref 26–192)
CK SERPL-CCNC: 447 U/L — HIGH (ref 26–192)
CO2 SERPL-SCNC: 14 MMOL/L — LOW (ref 22–31)
CO2 SERPL-SCNC: 15 MMOL/L — LOW (ref 22–31)
CO2 SERPL-SCNC: 17 MMOL/L — LOW (ref 22–31)
CO2 SERPL-SCNC: 18 MMOL/L — LOW (ref 22–31)
CO2 SERPL-SCNC: 8 MMOL/L — CRITICAL LOW (ref 22–31)
COCAINE METAB.OTHER UR-MCNC: NEGATIVE — SIGNIFICANT CHANGE UP
COLOR SPEC: YELLOW — SIGNIFICANT CHANGE UP
CREAT SERPL-MCNC: 1.28 MG/DL — SIGNIFICANT CHANGE UP (ref 0.5–1.3)
CREAT SERPL-MCNC: 1.46 MG/DL — HIGH (ref 0.5–1.3)
CREAT SERPL-MCNC: 1.89 MG/DL — HIGH (ref 0.5–1.3)
CREAT SERPL-MCNC: 1.95 MG/DL — HIGH (ref 0.5–1.3)
CREAT SERPL-MCNC: 2.73 MG/DL — HIGH (ref 0.5–1.3)
DIFF PNL FLD: ABNORMAL
EOSINOPHIL # BLD AUTO: 0 K/UL — SIGNIFICANT CHANGE UP (ref 0–0.5)
EOSINOPHIL NFR BLD AUTO: 0 % — SIGNIFICANT CHANGE UP (ref 0–6)
EPI CELLS # UR: ABNORMAL /HPF (ref 0–5)
ETHANOL SERPL-MCNC: <3 MG/DL — SIGNIFICANT CHANGE UP
GIANT PLATELETS BLD QL SMEAR: PRESENT — SIGNIFICANT CHANGE UP
GLUCOSE SERPL-MCNC: 1203 MG/DL — CRITICAL HIGH (ref 70–99)
GLUCOSE SERPL-MCNC: 137 MG/DL — HIGH (ref 70–99)
GLUCOSE SERPL-MCNC: 234 MG/DL — HIGH (ref 70–99)
GLUCOSE SERPL-MCNC: 375 MG/DL — HIGH (ref 70–99)
GLUCOSE SERPL-MCNC: 408 MG/DL — HIGH (ref 70–99)
GLUCOSE UR QL: NEGATIVE — SIGNIFICANT CHANGE UP
HCG SERPL-ACNC: 3 MIU/ML — SIGNIFICANT CHANGE UP
HCT VFR BLD CALC: 21.2 % — LOW (ref 34.5–45)
HCT VFR BLD CALC: 25.7 % — LOW (ref 34.5–45)
HCT VFR BLD CALC: 27.5 % — LOW (ref 34.5–45)
HCT VFR BLD CALC: 34.8 % — SIGNIFICANT CHANGE UP (ref 34.5–45)
HGB BLD-MCNC: 11.5 G/DL — SIGNIFICANT CHANGE UP (ref 11.5–15.5)
HGB BLD-MCNC: 6.8 G/DL — CRITICAL LOW (ref 11.5–15.5)
HGB BLD-MCNC: 8.4 G/DL — LOW (ref 11.5–15.5)
HGB BLD-MCNC: 9.2 G/DL — LOW (ref 11.5–15.5)
HYALINE CASTS # UR AUTO: ABNORMAL /LPF (ref 0–2)
IMM GRANULOCYTES NFR BLD AUTO: 0.3 % — SIGNIFICANT CHANGE UP (ref 0–1.5)
IMM GRANULOCYTES NFR BLD AUTO: 0.3 % — SIGNIFICANT CHANGE UP (ref 0–1.5)
IMM GRANULOCYTES NFR BLD AUTO: 0.4 % — SIGNIFICANT CHANGE UP (ref 0–1.5)
INR BLD: 1.23 — HIGH (ref 0.88–1.16)
INR BLD: 1.31 — HIGH (ref 0.88–1.16)
KETONES UR-MCNC: >=80 MG/DL
LACTATE SERPL-SCNC: 1.9 MMOL/L — SIGNIFICANT CHANGE UP (ref 0.5–2)
LACTATE SERPL-SCNC: 6.5 MMOL/L — CRITICAL HIGH (ref 0.4–2)
LACTATE SERPL-SCNC: 7.3 MMOL/L — CRITICAL HIGH (ref 0.4–2)
LEUKOCYTE ESTERASE UR-ACNC: NEGATIVE — SIGNIFICANT CHANGE UP
LIDOCAIN IGE QN: 976 U/L — HIGH (ref 73–393)
LYMPHOCYTES # BLD AUTO: 0.2 K/UL — LOW (ref 1–3.3)
LYMPHOCYTES # BLD AUTO: 0.37 K/UL — LOW (ref 1–3.3)
LYMPHOCYTES # BLD AUTO: 0.46 K/UL — LOW (ref 1–3.3)
LYMPHOCYTES # BLD AUTO: 11.3 % — LOW (ref 13–44)
LYMPHOCYTES # BLD AUTO: 5.5 % — LOW (ref 13–44)
LYMPHOCYTES # BLD AUTO: 8.9 % — LOW (ref 13–44)
MACROCYTES BLD QL: SLIGHT — SIGNIFICANT CHANGE UP
MAGNESIUM SERPL-MCNC: 2.9 MG/DL — HIGH (ref 1.6–2.6)
MANUAL SMEAR VERIFICATION: SIGNIFICANT CHANGE UP
MANUAL SMEAR VERIFICATION: SIGNIFICANT CHANGE UP
MCHC RBC-ENTMCNC: 32.1 GM/DL — SIGNIFICANT CHANGE UP (ref 32–36)
MCHC RBC-ENTMCNC: 32.7 GM/DL — SIGNIFICANT CHANGE UP (ref 32–36)
MCHC RBC-ENTMCNC: 33 GM/DL — SIGNIFICANT CHANGE UP (ref 32–36)
MCHC RBC-ENTMCNC: 33.1 PG — SIGNIFICANT CHANGE UP (ref 27–34)
MCHC RBC-ENTMCNC: 33.5 GM/DL — SIGNIFICANT CHANGE UP (ref 32–36)
MCHC RBC-ENTMCNC: 33.7 PG — SIGNIFICANT CHANGE UP (ref 27–34)
MCHC RBC-ENTMCNC: 34 PG — SIGNIFICANT CHANGE UP (ref 27–34)
MCHC RBC-ENTMCNC: 34.2 PG — HIGH (ref 27–34)
MCV RBC AUTO: 100.7 FL — HIGH (ref 80–100)
MCV RBC AUTO: 101.2 FL — HIGH (ref 80–100)
MCV RBC AUTO: 103 FL — HIGH (ref 80–100)
MCV RBC AUTO: 106.5 FL — HIGH (ref 80–100)
METHADONE UR-MCNC: NEGATIVE — SIGNIFICANT CHANGE UP
MONOCYTES # BLD AUTO: 0.18 K/UL — SIGNIFICANT CHANGE UP (ref 0–0.9)
MONOCYTES # BLD AUTO: 0.21 K/UL — SIGNIFICANT CHANGE UP (ref 0–0.9)
MONOCYTES # BLD AUTO: 0.29 K/UL — SIGNIFICANT CHANGE UP (ref 0–0.9)
MONOCYTES NFR BLD AUTO: 5.5 % — SIGNIFICANT CHANGE UP (ref 2–14)
MONOCYTES NFR BLD AUTO: 5.6 % — SIGNIFICANT CHANGE UP (ref 2–14)
MONOCYTES NFR BLD AUTO: 5.8 % — SIGNIFICANT CHANGE UP (ref 2–14)
NEUTROPHILS # BLD AUTO: 2.71 K/UL — SIGNIFICANT CHANGE UP (ref 1.8–7.4)
NEUTROPHILS # BLD AUTO: 3.22 K/UL — SIGNIFICANT CHANGE UP (ref 1.8–7.4)
NEUTROPHILS # BLD AUTO: 4.37 K/UL — SIGNIFICANT CHANGE UP (ref 1.8–7.4)
NEUTROPHILS NFR BLD AUTO: 82.6 % — HIGH (ref 43–77)
NEUTROPHILS NFR BLD AUTO: 84.5 % — HIGH (ref 43–77)
NEUTROPHILS NFR BLD AUTO: 88.4 % — HIGH (ref 43–77)
NITRITE UR-MCNC: NEGATIVE — SIGNIFICANT CHANGE UP
NRBC # BLD: 0 /100 WBCS — SIGNIFICANT CHANGE UP (ref 0–0)
OB PNL STL: POSITIVE
OPIATES UR-MCNC: NEGATIVE — SIGNIFICANT CHANGE UP
OSMOLALITY SERPL: 329 MOSMOL/KG — HIGH (ref 275–300)
PAPPENHEIMER BOD BLD QL SMEAR: PRESENT — SIGNIFICANT CHANGE UP
PCO2 BLDV: 38 MMHG — LOW (ref 39–42)
PCO2 BLDV: 44 MMHG — HIGH (ref 39–42)
PCP SPEC-MCNC: SIGNIFICANT CHANGE UP
PCP UR-MCNC: NEGATIVE — SIGNIFICANT CHANGE UP
PH BLDV: 7.08 — LOW (ref 7.32–7.43)
PH BLDV: 7.2 — LOW (ref 7.32–7.43)
PH UR: 6.5 — SIGNIFICANT CHANGE UP (ref 5–8)
PLAT MORPH BLD: ABNORMAL
PLAT MORPH BLD: SIGNIFICANT CHANGE UP
PLATELET # BLD AUTO: 54 K/UL — LOW (ref 150–400)
PLATELET # BLD AUTO: 57 K/UL — LOW (ref 150–400)
PLATELET # BLD AUTO: 60 K/UL — LOW (ref 150–400)
PLATELET # BLD AUTO: 93 K/UL — LOW (ref 150–400)
PLATELET COUNT - ESTIMATE: ABNORMAL
PO2 BLDV: <35 MMHG — SIGNIFICANT CHANGE UP (ref 25–45)
PO2 BLDV: <35 MMHG — SIGNIFICANT CHANGE UP (ref 25–45)
POTASSIUM SERPL-MCNC: 2.4 MMOL/L — CRITICAL LOW (ref 3.5–5.3)
POTASSIUM SERPL-MCNC: 2.5 MMOL/L — CRITICAL LOW (ref 3.5–5.3)
POTASSIUM SERPL-MCNC: 2.6 MMOL/L — CRITICAL LOW (ref 3.5–5.3)
POTASSIUM SERPL-MCNC: 3 MMOL/L — LOW (ref 3.5–5.3)
POTASSIUM SERPL-MCNC: 3.2 MMOL/L — LOW (ref 3.5–5.3)
POTASSIUM SERPL-SCNC: 2.4 MMOL/L — CRITICAL LOW (ref 3.5–5.3)
POTASSIUM SERPL-SCNC: 2.5 MMOL/L — CRITICAL LOW (ref 3.5–5.3)
POTASSIUM SERPL-SCNC: 2.6 MMOL/L — CRITICAL LOW (ref 3.5–5.3)
POTASSIUM SERPL-SCNC: 3 MMOL/L — LOW (ref 3.5–5.3)
POTASSIUM SERPL-SCNC: 3.2 MMOL/L — LOW (ref 3.5–5.3)
PROT SERPL-MCNC: 4.2 G/DL — LOW (ref 6.4–8.2)
PROT SERPL-MCNC: 5.5 G/DL — LOW (ref 6–8.3)
PROT SERPL-MCNC: 5.6 G/DL — LOW (ref 6–8.3)
PROT SERPL-MCNC: 5.7 G/DL — LOW (ref 6.4–8.2)
PROT SERPL-MCNC: 7.7 G/DL — SIGNIFICANT CHANGE UP (ref 6.4–8.2)
PROT UR-MCNC: 100 MG/DL
PROTHROM AB SERPL-ACNC: 14.6 SEC — HIGH (ref 10.6–13.6)
PROTHROM AB SERPL-ACNC: 15.5 SEC — HIGH (ref 10.6–13.6)
RBC # BLD: 1.99 M/UL — LOW (ref 3.8–5.2)
RBC # BLD: 2.51 M/UL — LOW (ref 3.8–5.2)
RBC # BLD: 2.54 M/UL — LOW (ref 3.8–5.2)
RBC # BLD: 2.73 M/UL — LOW (ref 3.8–5.2)
RBC # BLD: 3.38 M/UL — LOW (ref 3.8–5.2)
RBC # FLD: 15.2 % — HIGH (ref 10.3–14.5)
RBC # FLD: 15.4 % — HIGH (ref 10.3–14.5)
RBC # FLD: 15.4 % — HIGH (ref 10.3–14.5)
RBC # FLD: 15.9 % — HIGH (ref 10.3–14.5)
RBC BLD AUTO: ABNORMAL
RBC BLD AUTO: SIGNIFICANT CHANGE UP
RBC CASTS # UR COMP ASSIST: < 5 /HPF — SIGNIFICANT CHANGE UP
RETICS #: 43.2 K/UL — SIGNIFICANT CHANGE UP (ref 25–125)
RETICS/RBC NFR: 1.7 % — SIGNIFICANT CHANGE UP (ref 0.5–2.5)
RH IG SCN BLD-IMP: POSITIVE — SIGNIFICANT CHANGE UP
RH IG SCN BLD-IMP: POSITIVE — SIGNIFICANT CHANGE UP
SALICYLATES SERPL-MCNC: <0.2 MG/DL — LOW (ref 2.8–20)
SAO2 % BLDV: 35.7 % — LOW (ref 67–88)
SAO2 % BLDV: 40.9 % — LOW (ref 67–88)
SARS-COV-2 RNA SPEC QL NAA+PROBE: SIGNIFICANT CHANGE UP
SODIUM SERPL-SCNC: 123 MMOL/L — LOW (ref 132–145)
SODIUM SERPL-SCNC: 137 MMOL/L — SIGNIFICANT CHANGE UP (ref 135–145)
SODIUM SERPL-SCNC: 140 MMOL/L — SIGNIFICANT CHANGE UP (ref 135–145)
SODIUM SERPL-SCNC: 142 MMOL/L — SIGNIFICANT CHANGE UP (ref 132–145)
SODIUM SERPL-SCNC: 142 MMOL/L — SIGNIFICANT CHANGE UP (ref 132–145)
SP GR SPEC: 1.02 — SIGNIFICANT CHANGE UP (ref 1–1.03)
THC UR QL: NEGATIVE — SIGNIFICANT CHANGE UP
TROPONIN I, HIGH SENSITIVITY RESULT: 17 NG/L — SIGNIFICANT CHANGE UP
UROBILINOGEN FLD QL: 0.2 E.U./DL — SIGNIFICANT CHANGE UP
WBC # BLD: 3.28 K/UL — LOW (ref 3.8–10.5)
WBC # BLD: 3.64 K/UL — LOW (ref 3.8–10.5)
WBC # BLD: 3.99 K/UL — SIGNIFICANT CHANGE UP (ref 3.8–10.5)
WBC # BLD: 5.17 K/UL — SIGNIFICANT CHANGE UP (ref 3.8–10.5)
WBC # FLD AUTO: 3.28 K/UL — LOW (ref 3.8–10.5)
WBC # FLD AUTO: 3.64 K/UL — LOW (ref 3.8–10.5)
WBC # FLD AUTO: 3.99 K/UL — SIGNIFICANT CHANGE UP (ref 3.8–10.5)
WBC # FLD AUTO: 5.17 K/UL — SIGNIFICANT CHANGE UP (ref 3.8–10.5)
WBC UR QL: ABNORMAL /HPF

## 2022-01-18 PROCEDURE — 99253 IP/OBS CNSLTJ NEW/EST LOW 45: CPT

## 2022-01-18 PROCEDURE — 99291 CRITICAL CARE FIRST HOUR: CPT

## 2022-01-18 PROCEDURE — 99292 CRITICAL CARE ADDL 30 MIN: CPT

## 2022-01-18 PROCEDURE — 72131 CT LUMBAR SPINE W/O DYE: CPT | Mod: 26

## 2022-01-18 PROCEDURE — 70450 CT HEAD/BRAIN W/O DYE: CPT | Mod: 26

## 2022-01-18 PROCEDURE — 71045 X-RAY EXAM CHEST 1 VIEW: CPT | Mod: 26

## 2022-01-18 PROCEDURE — 74176 CT ABD & PELVIS W/O CONTRAST: CPT | Mod: 26

## 2022-01-18 PROCEDURE — 99223 1ST HOSP IP/OBS HIGH 75: CPT | Mod: GC

## 2022-01-18 PROCEDURE — 72125 CT NECK SPINE W/O DYE: CPT | Mod: 26

## 2022-01-18 PROCEDURE — 93010 ELECTROCARDIOGRAM REPORT: CPT

## 2022-01-18 PROCEDURE — 71250 CT THORAX DX C-: CPT | Mod: 26

## 2022-01-18 RX ORDER — OCTREOTIDE ACETATE 200 UG/ML
50 INJECTION, SOLUTION INTRAVENOUS; SUBCUTANEOUS ONCE
Refills: 0 | Status: COMPLETED | OUTPATIENT
Start: 2022-01-18 | End: 2022-01-18

## 2022-01-18 RX ORDER — SODIUM CHLORIDE 9 MG/ML
1000 INJECTION, SOLUTION INTRAVENOUS
Refills: 0 | Status: DISCONTINUED | OUTPATIENT
Start: 2022-01-18 | End: 2022-01-18

## 2022-01-18 RX ORDER — POTASSIUM CHLORIDE 20 MEQ
10 PACKET (EA) ORAL ONCE
Refills: 0 | Status: COMPLETED | OUTPATIENT
Start: 2022-01-18 | End: 2022-01-18

## 2022-01-18 RX ORDER — MORPHINE SULFATE 50 MG/1
4 CAPSULE, EXTENDED RELEASE ORAL ONCE
Refills: 0 | Status: DISCONTINUED | OUTPATIENT
Start: 2022-01-18 | End: 2022-01-18

## 2022-01-18 RX ORDER — INFLUENZA VIRUS VACCINE 15; 15; 15; 15 UG/.5ML; UG/.5ML; UG/.5ML; UG/.5ML
0.5 SUSPENSION INTRAMUSCULAR ONCE
Refills: 0 | Status: DISCONTINUED | OUTPATIENT
Start: 2022-01-18 | End: 2022-01-26

## 2022-01-18 RX ORDER — PANTOPRAZOLE SODIUM 20 MG/1
8 TABLET, DELAYED RELEASE ORAL
Qty: 80 | Refills: 0 | Status: DISCONTINUED | OUTPATIENT
Start: 2022-01-18 | End: 2022-01-19

## 2022-01-18 RX ORDER — CEFTRIAXONE 500 MG/1
1000 INJECTION, POWDER, FOR SOLUTION INTRAMUSCULAR; INTRAVENOUS ONCE
Refills: 0 | Status: COMPLETED | OUTPATIENT
Start: 2022-01-18 | End: 2022-01-18

## 2022-01-18 RX ORDER — DIAZEPAM 5 MG
5 TABLET ORAL ONCE
Refills: 0 | Status: DISCONTINUED | OUTPATIENT
Start: 2022-01-18 | End: 2022-01-18

## 2022-01-18 RX ORDER — THIAMINE MONONITRATE (VIT B1) 100 MG
500 TABLET ORAL EVERY 8 HOURS
Refills: 0 | Status: DISCONTINUED | OUTPATIENT
Start: 2022-01-18 | End: 2022-01-18

## 2022-01-18 RX ORDER — SODIUM BICARBONATE 1 MEQ/ML
50 SYRINGE (ML) INTRAVENOUS ONCE
Refills: 0 | Status: COMPLETED | OUTPATIENT
Start: 2022-01-18 | End: 2022-01-18

## 2022-01-18 RX ORDER — PANTOPRAZOLE SODIUM 20 MG/1
80 TABLET, DELAYED RELEASE ORAL ONCE
Refills: 0 | Status: COMPLETED | OUTPATIENT
Start: 2022-01-18 | End: 2022-01-18

## 2022-01-18 RX ORDER — SODIUM CHLORIDE 9 MG/ML
1000 INJECTION, SOLUTION INTRAVENOUS ONCE
Refills: 0 | Status: COMPLETED | OUTPATIENT
Start: 2022-01-18 | End: 2022-01-18

## 2022-01-18 RX ORDER — ACETAMINOPHEN 500 MG
650 TABLET ORAL EVERY 6 HOURS
Refills: 0 | Status: DISCONTINUED | OUTPATIENT
Start: 2022-01-18 | End: 2022-01-23

## 2022-01-18 RX ORDER — SODIUM CHLORIDE 9 MG/ML
2000 INJECTION, SOLUTION INTRAVENOUS ONCE
Refills: 0 | Status: COMPLETED | OUTPATIENT
Start: 2022-01-18 | End: 2022-01-18

## 2022-01-18 RX ORDER — NOREPINEPHRINE BITARTRATE/D5W 8 MG/250ML
0.05 PLASTIC BAG, INJECTION (ML) INTRAVENOUS
Qty: 8 | Refills: 0 | Status: DISCONTINUED | OUTPATIENT
Start: 2022-01-18 | End: 2022-01-18

## 2022-01-18 RX ORDER — OCTREOTIDE ACETATE 200 UG/ML
50 INJECTION, SOLUTION INTRAVENOUS; SUBCUTANEOUS
Qty: 500 | Refills: 0 | Status: DISCONTINUED | OUTPATIENT
Start: 2022-01-18 | End: 2022-01-19

## 2022-01-18 RX ORDER — POTASSIUM CHLORIDE 20 MEQ
10 PACKET (EA) ORAL
Refills: 0 | Status: COMPLETED | OUTPATIENT
Start: 2022-01-18 | End: 2022-01-18

## 2022-01-18 RX ORDER — THIAMINE MONONITRATE (VIT B1) 100 MG
500 TABLET ORAL EVERY 8 HOURS
Refills: 0 | Status: COMPLETED | OUTPATIENT
Start: 2022-01-18 | End: 2022-01-21

## 2022-01-18 RX ORDER — FOLIC ACID 0.8 MG
1 TABLET ORAL DAILY
Refills: 0 | Status: DISCONTINUED | OUTPATIENT
Start: 2022-01-18 | End: 2022-01-26

## 2022-01-18 RX ORDER — MULTIVIT-MIN/FERROUS GLUCONATE 9 MG/15 ML
1 LIQUID (ML) ORAL DAILY
Refills: 0 | Status: DISCONTINUED | OUTPATIENT
Start: 2022-01-18 | End: 2022-01-26

## 2022-01-18 RX ORDER — ONDANSETRON 8 MG/1
4 TABLET, FILM COATED ORAL ONCE
Refills: 0 | Status: COMPLETED | OUTPATIENT
Start: 2022-01-18 | End: 2022-01-18

## 2022-01-18 RX ORDER — POTASSIUM CHLORIDE 20 MEQ
40 PACKET (EA) ORAL ONCE
Refills: 0 | Status: COMPLETED | OUTPATIENT
Start: 2022-01-18 | End: 2022-01-18

## 2022-01-18 RX ORDER — PIPERACILLIN AND TAZOBACTAM 4; .5 G/20ML; G/20ML
3.38 INJECTION, POWDER, LYOPHILIZED, FOR SOLUTION INTRAVENOUS ONCE
Refills: 0 | Status: COMPLETED | OUTPATIENT
Start: 2022-01-18 | End: 2022-01-18

## 2022-01-18 RX ORDER — VANCOMYCIN HCL 1 G
1000 VIAL (EA) INTRAVENOUS ONCE
Refills: 0 | Status: COMPLETED | OUTPATIENT
Start: 2022-01-18 | End: 2022-01-18

## 2022-01-18 RX ADMIN — PANTOPRAZOLE SODIUM 10 MG/HR: 20 TABLET, DELAYED RELEASE ORAL at 08:44

## 2022-01-18 RX ADMIN — PIPERACILLIN AND TAZOBACTAM 200 GRAM(S): 4; .5 INJECTION, POWDER, LYOPHILIZED, FOR SOLUTION INTRAVENOUS at 09:35

## 2022-01-18 RX ADMIN — Medication 100 MILLIEQUIVALENT(S): at 23:50

## 2022-01-18 RX ADMIN — PANTOPRAZOLE SODIUM 80 MILLIGRAM(S): 20 TABLET, DELAYED RELEASE ORAL at 07:34

## 2022-01-18 RX ADMIN — Medication 50 MILLIEQUIVALENT(S): at 07:49

## 2022-01-18 RX ADMIN — SODIUM CHLORIDE 2000 MILLILITER(S): 9 INJECTION, SOLUTION INTRAVENOUS at 07:15

## 2022-01-18 RX ADMIN — Medication 100 MILLIEQUIVALENT(S): at 18:18

## 2022-01-18 RX ADMIN — ONDANSETRON 4 MILLIGRAM(S): 8 TABLET, FILM COATED ORAL at 07:05

## 2022-01-18 RX ADMIN — CEFTRIAXONE 100 MILLIGRAM(S): 500 INJECTION, POWDER, FOR SOLUTION INTRAMUSCULAR; INTRAVENOUS at 07:34

## 2022-01-18 RX ADMIN — OCTREOTIDE ACETATE 50 MICROGRAM(S): 200 INJECTION, SOLUTION INTRAVENOUS; SUBCUTANEOUS at 07:50

## 2022-01-18 RX ADMIN — SODIUM CHLORIDE 1000 MILLILITER(S): 9 INJECTION, SOLUTION INTRAVENOUS at 22:21

## 2022-01-18 RX ADMIN — Medication 100 MILLIEQUIVALENT(S): at 18:59

## 2022-01-18 RX ADMIN — Medication 650 MILLIGRAM(S): at 23:06

## 2022-01-18 RX ADMIN — Medication 40 MILLIEQUIVALENT(S): at 22:17

## 2022-01-18 RX ADMIN — Medication 250 MILLIGRAM(S): at 09:52

## 2022-01-18 RX ADMIN — Medication 100 MILLIEQUIVALENT(S): at 09:35

## 2022-01-18 RX ADMIN — SODIUM CHLORIDE 1000 MILLILITER(S): 9 INJECTION, SOLUTION INTRAVENOUS at 08:41

## 2022-01-18 RX ADMIN — SODIUM CHLORIDE 150 MILLILITER(S): 9 INJECTION, SOLUTION INTRAVENOUS at 09:52

## 2022-01-18 RX ADMIN — Medication 5 MILLIGRAM(S): at 12:48

## 2022-01-18 RX ADMIN — Medication 5 MILLIGRAM(S): at 10:31

## 2022-01-18 RX ADMIN — OCTREOTIDE ACETATE 10 MICROGRAM(S)/HR: 200 INJECTION, SOLUTION INTRAVENOUS; SUBCUTANEOUS at 08:47

## 2022-01-18 RX ADMIN — Medication 100 MILLIEQUIVALENT(S): at 11:59

## 2022-01-18 RX ADMIN — PANTOPRAZOLE SODIUM 10 MG/HR: 20 TABLET, DELAYED RELEASE ORAL at 18:45

## 2022-01-18 RX ADMIN — OCTREOTIDE ACETATE 10 MICROGRAM(S)/HR: 200 INJECTION, SOLUTION INTRAVENOUS; SUBCUTANEOUS at 18:45

## 2022-01-18 NOTE — ED ADULT NURSE REASSESSMENT NOTE - NS ED NURSE REASSESS COMMENT FT1
Transfer of care from TASHI Alexander. Pt laying in bed, in NAD currently. Pt on continuos cardiac, O2, BP and temp monitoring. Pt on blanket warmer and receiving warmed IV fluids. Pt is A&Ox3 currently. Pending new labs, medications and CT. RN and MD at bedside for pt care huddle. Fall precautions in place. Will continue to monitor closely. Transfer of care from TASHI Alexander. Pt laying in bed, in NAD currently. Pt on continuos cardiac, O2, BP and temp monitoring. Pt on blanket warmer and receiving warmed IV fluids. Pt is A&Ox3 currently. Pending new labs, medications and CT, hold levophed as per MD Chavez as per BP improved. RN and MD at bedside for pt care huddle. Fall precautions in place. Will continue to monitor closely.

## 2022-01-18 NOTE — ED PROVIDER NOTE - PHYSICAL EXAMINATION
Physical Exam  GEN: Awake, alert, non-toxic appearing,  EYES: full EOMI,  ENT: External inspection normal, normal voice,   HEAD: atraumatic  NECK: FROM neck, supple,   CV: rrr  RESP: cta bl, no tachypnea, no hypoxia, no resp distress,  GI: nondistended, soft, nontender  SKIN: pale conjunctiva, jaundice  NEURO: ao x 3, generalized weakness, gait not assess due to overall weakness

## 2022-01-18 NOTE — ED ADULT TRIAGE NOTE - CHIEF COMPLAINT QUOTE
pt BIBA for vomiting blood. states that she is a daily drinker, endorses history of daily etoh and pancreatitis.

## 2022-01-18 NOTE — H&P ADULT - ASSESSMENT
47F PMHx of chronic alcoholism (no hx ICU adm or seizures), anxiety, depression presenting alcohol w/d with N/V of coffee ground emesis. Planned for EDG tomorrow

## 2022-01-18 NOTE — ED PROVIDER NOTE - PROGRESS NOTE DETAILS
pt found to be hypothermic, started rewarming.  pt's initial bp was registered via manual, systolic 80s, ao x 3, concern for worsening hypotension during rewarming, peripheral levophed started Pt reassess.  Pt is 48 y/o F with PMH of alcohol abuse (states she drinks 3-4 glasses of scotch daily).  Her last drink was 18 hours ago.  She denies a history of seizure withdrawal.  She stopped drinking due to persistent vomiting.  She reports 2 recent falls due to dizziness/lightheadedness, unknown LOC.  Pt also c/o R sided abd pain and lumbar back pain.  Pt also c/o intermittent chest pain.  Pt is not vaccinated against COVID19.    Pt reassessed:   Constitutional:  Ill appearing, awake, alert, oriented to person, place, time/situation and in no apparent distress  Head:  NC/AT, symmetric, neck supple  ENMT: Airway patent, nasal mucosa clear, mouth with normal mucosa, dried black emesis on lips and tongue, throat has no vesicles, no oropharyngeal exudates and vulva is midline  Eyes:  slighlty icteric, pupils equal, round and reactive to light, 3mm bilat  Cardiac:  Normal rate, regular rhythm. Heart sounds S1,S2.  No murmurs, rubs or gallops.  No JVD.  Respiratory:  Breath sounds clear and equal bilaterally  GI:   Abd soft, +ttp to R lower abd, nl rectal tone on BARBAAR, +melena  :  No discharge or lesions  MSK:   Pt moving all extremities, no midline cervical or lumbar spinal ttp.  + ttp to mid and paraspinal lumbar ttp, no step-offs.  pelvis stable, FROM of extremities x4  Neuro:  Alert and oriented, no focal deficits, no motor or sensory deficits  Skin:  + pallor and cool.  multiple ecchymosis in different stages of healing to lumbar spine, buttocks and extremities.  approx 4 cm stage 1 pressure ulcer to coccyx.    Psych:  Normal mood and affect, no apparent risk to self or others.  Heme:  No adenopathy or splenomegaly Received sign out at 8am. [TIM Chavez MD]: Received sign out at 8am from overnight team. Pt seen and examined at bedside. Pt appears chronically ill with slow but coherent speech. Pt endorses dizziness with multiple falls last night and into this morning. She notes R sided lower back pain and a minor head injury. She endorses heavy  alcohol use [3 glasses of scotch per day; Last drink was at 2pm yesterday]. Pt endorses Hx of alcohol withdrawal in the past. Reviewed initial set of lab work, vital signs, and imaging. On exam, Pt is AOx3. No loud murmurs. [+] Anicteric sclera, dry tongue, with coffee ground crusted material over tongue and lips, guarding on abdominal exam, and L -spine tenderness. Pt has multiple areas of ecchymosis in different stages in healing. There is a stage 1 decubitus ulcer. Will add magnesium level, Garcia catheter, and repeat blood work including lactate and blood gas. Imaging of Abd and L-spine added. Ordered potassium replacement for hyperkalemia. CK and magnesium levels ordered. [TIM Chavez MD]: BP remains stable at 129/88 after 2.5 liters. Garcia inserted with 400cc yellow urine output. Urine with greater than 80 ketones, Bacteria, and RBC. Blood alcohol is normal. COVID-19 PCR is undetected. Magnesium slightly elevated. Repeat blood gas improved with pH of 7.2. Repeat labs suggest contamination with IVFS, D5NS, given elevated glucose 1275.  Will repeat.  Pt d/w Dr. Duy Umanzor, GI fellow.  Attempted call to ortho and MICU, awaiting call back. Pt d/w Dr. Michelle, ortho on call.  CT findings discussed.  No orthopedic interventions advised.  In relation to iliac crest fracture, pt is may WBAT. Repeat labs show improvement to electrolytes, renal function and LFTs.  H&H 9/27 and platelets 60.  Pt d/w Dr. Bañuelos, MICU attending and accepted to medical stepdown under Dr. Sanchez. [TIM Chavez MD]: Received sign out at 8am from overnight team. Pt seen and examined at bedside. Pt appears chronically ill with slow but coherent speech. Pt endorses dizziness with multiple falls last night and into this morning. She notes R sided lower back pain and a minor head injury. She endorses heavy  alcohol use [3 glasses of scotch per day; Last drink was at 2pm yesterday]. Pt endorses Hx of alcohol withdrawal in the past. Reviewed initial set of lab work, vital signs, and imaging. On exam, Pt is AOx3. No loud murmurs. [+] Anicteric sclera, dry tongue, with coffee ground crusted material over tongue and lips, guarding on abdominal exam, and L -spine tenderness. Pt has multiple areas of ecchymosis in different stages in healing. There is a stage 1 decubitus ulcer. Will add magnesium level, Garcia catheter, and repeat blood work including lactate and blood gas. Imaging of Abd and L-spine added. Ordered potassium replacement for hypokalemia. CK and magnesium levels ordered. {URSULA Chavez MD} - stable for transfer to [facility] stepdown.  BP remains stable with good UO.  Lactate slighly downtrending.  Imaging without acute findings except for R iliac crest fracture and no acute intervention indicated.  core body temp improved, so d/c'd xiao roberts.  Repeat labs show improved HC03, AG, renal function. Hypokalemia persists, more potassium replacement ordered. GI notified about UGIB.

## 2022-01-18 NOTE — ED PROVIDER NOTE - PRINCIPAL DIAGNOSIS
Pt resting comfortably in bed.  No seizure activity noted Hematemesis/vomiting blood Severe thrombocytopenia

## 2022-01-18 NOTE — H&P ADULT - PROBLEM SELECTOR PLAN 4
In the setting of active alcohol use. Question of underlying steatosis vs cirrhosis, but no clear evidence of decompensation (no ascites, no known history of variceal hemorrhage, no documented hepatic encephalopathy, no splenomegaly on CT). Current elevation with AST>ALT, elevated bili, and coagulopathy. CT: Hepatic steatosis.  - Maddrey Discriminant Function score of 15 on admission suggestive of good prognosis  - INR and CMP daily  - SW for EtOH cessation  - Nutrition evaluation  - UTox  - Trend CK given AST elevation and UA findings suggestive of possible Rhabdomyolysis after fall

## 2022-01-18 NOTE — ED PROVIDER NOTE - SECONDARY DIAGNOSIS.
Hypotension Body temperature low Lactic acidosis Starvation ketoacidosis Transaminitis Closed fracture of right iliac crest Alcohol withdrawal

## 2022-01-18 NOTE — CONSULT NOTE ADULT - SUBJECTIVE AND OBJECTIVE BOX
***INCOMPLETE NOTE***    GASTROENTEROLOGY CONSULT NOTE  Patient is a 47 year old female with pmhx of chronic alcoholism, anxiety, depression presenting with nausea and vomiting roughly every 2 hours since Sunday. She has a history of severe alcohol abuse for which she drinks 0.5L of Whiskey per day but less since her symptoms began. During this time span she has been unable to keep up with PO liquids or foods, no food since Sunday. Reports to vomiting some brown "coffee ground" liquid.   Endorses intermittent lower abdominal pain. Denies diarrhea and ROS otherwise negative. The patient denies history of alcohol withdrawal related seizures or ICU stays. Also denies history of pancreatitis. GI was called for coffee ground emesis.       VS: T90.1F/32.3C (rectal), HR 89, BP 80/40, RR 18, SpO2 98% RA  Abnormal Labs: Hgb 11.5>6.8>9.2, , PLTs 93 > 60, K 3.2, Cl 94, HCO3 8, AG 40, Cr 2.73, Glucose 137>234 (1203 reading was contamination from D5 ggt), Sosm 329, Ca 9.1>5.9>8.0, TBili 7.2 (DBili 5.3, IBili 1.9), , AST//276, Lipase 976, , VBG 7.08/38/<35, Lactate 7.3>6.5  UA: Small Bili, >80 ketones, 100 protein, large blood, 5-10 WBCs, Many hyaline casts  CTH: Right parieto-occipital scalp soft tissue swelling with underlying hematoma. No intracranial hemorrhage or calvarial fracture.  CT: C-spine: No acute fracture or joint dislocation involving the cervical spine.  CTAbd: Acute comminuted fracture posterior right iliac crest. No acute visceral organ injury. Gas-filled mildly distended small bowel loops suggesting ileus. Marked hepatomegaly and steatosis.  CT L-spine: Acute comminuted fractures of the right posterior iliac crest with minimally displaced fragments.  Orders: 3L LR bolus, 0.3L D5+0.9%NS ggt, Protonix ggt 8mg/hr, Octreotide ggt 50mcg/hr   (18 Jan 2022 15:43)    Allergies    No Known Allergies    Intolerances      Home Medications:    MEDICATIONS:  MEDICATIONS  (STANDING):  octreotide  Infusion 50 MICROgram(s)/Hr (10 mL/Hr) IV Continuous <Continuous>  pantoprazole Infusion 8 mG/Hr (10 mL/Hr) IV Continuous <Continuous>    MEDICATIONS  (PRN):    PAST MEDICAL & SURGICAL HISTORY:  Anxiety    Depression    Alcohol abuse    Iron deficiency anemia, unspecified iron deficiency anemia type    Liver fatty degeneration    No significant past surgical history      FAMILY HISTORY:    SOCIAL HISTORY:  Tobacoo: [ ] Current, [ ] Former, [ ] Never; Pack Years:  Alcohol:  Illicit Drugs:    REVIEW OF SYSTEMS:  CONSTITUTIONAL: No weakness, fevers or chills  HEENT: No visual changes; No vertigo or throat pain   NECK: No pain or stiffness  RESPIRATORY: No cough, wheezing, hemoptysis; No shortness of breath  CARDIOVASCULAR: No chest pain or palpitations  GASTROINTESTINAL: No abdominal or epigastric pain. No nausea, vomiting, or hematemesis; No diarrhea or constipation. No melena or hematochezia.  GENITOURINARY: No dysuria, frequency or hematuria  NEUROLOGICAL: No numbness or weakness  SKIN: No itching, burning, rashes, or lesions   All other 10 review of systems is negative unless indicated above.    Vital Signs Last 24 Hrs  T(C): 37.1 (18 Jan 2022 14:30), Max: 37.2 (18 Jan 2022 12:52)  T(F): 98.7 (18 Jan 2022 14:30), Max: 99 (18 Jan 2022 12:52)  HR: 115 (18 Jan 2022 14:30) (86 - 115)  BP: 141/86 (18 Jan 2022 14:30) (80/40 - 141/86)  BP(mean): --  RR: 18 (18 Jan 2022 14:30) (18 - 20)  SpO2: 98% (18 Jan 2022 14:30) (96% - 99%)    01-18 @ 07:01  -  01-18 @ 16:19  --------------------------------------------------------  IN: 0 mL / OUT: 450 mL / NET: -450 mL        PHYSICAL EXAM:    General: middle aged female, no acute distress  Eyes: Anicteric sclerae, moist conjunctivae  HENT: Moist mucous membranes  Neck: Trachea midline, supple  Abdomen: Soft, mildy TTP, non-distended; Normal bowel sounds; No rebound or guarding  Extremities: ecchymosis b/l lower extremities  Neurological: Alert and oriented x3  Skin: Warm and dry. No obvious rash    LABS:                        9.2    3.99  )-----------( 60       ( 18 Jan 2022 11:22 )             27.5     01-18    142  |  97  |  15  ----------------------------<  234<H>  3.0<L>   |  17<L>  |  1.95<H>    Ca    8.0<L>      18 Jan 2022 11:22  Mg     2.9     01-18    TPro  5.7<L>  /  Alb  3.0<L>  /  TBili  6.7<H>  /  DBili  x   /  AST  605<H>  /  ALT  193<H>  /  AlkPhos  107  01-18        PT/INR - ( 18 Jan 2022 07:05 )   PT: 14.6 sec;   INR: 1.23          PTT - ( 18 Jan 2022 07:05 )  PTT:33.0 sec    RADIOLOGY & ADDITIONAL STUDIES:     Reviewed GASTROENTEROLOGY CONSULT NOTE  Patient is a 47 year old female with pmhx of chronic alcoholism, anxiety, depression presenting with nausea and vomiting roughly every 2 hours since Sunday. She has a history of severe alcohol abuse for which she drinks 0.5L of Whiskey per day but less since her symptoms began. During this time span she has been unable to keep up with PO liquids or foods, no food since Sunday. Reports to vomiting some brown "coffee ground" liquid.   Endorses intermittent lower abdominal pain. Denies diarrhea and ROS otherwise negative. The patient denies history of alcohol withdrawal related seizures or ICU stays. Also denies history of pancreatitis. GI was called for coffee ground emesis.       VS: T90.1F/32.3C (rectal), HR 89, BP 80/40, RR 18, SpO2 98% RA  Abnormal Labs: Hgb 11.5>6.8>9.2, , PLTs 93 > 60, K 3.2, Cl 94, HCO3 8, AG 40, Cr 2.73, Glucose 137>234 (1203 reading was contamination from D5 ggt), Sosm 329, Ca 9.1>5.9>8.0, TBili 7.2 (DBili 5.3, IBili 1.9), , AST//276, Lipase 976, , VBG 7.08/38/<35, Lactate 7.3>6.5  UA: Small Bili, >80 ketones, 100 protein, large blood, 5-10 WBCs, Many hyaline casts  CTH: Right parieto-occipital scalp soft tissue swelling with underlying hematoma. No intracranial hemorrhage or calvarial fracture.  CT: C-spine: No acute fracture or joint dislocation involving the cervical spine.  CTAbd: Acute comminuted fracture posterior right iliac crest. No acute visceral organ injury. Gas-filled mildly distended small bowel loops suggesting ileus. Marked hepatomegaly and steatosis.  CT L-spine: Acute comminuted fractures of the right posterior iliac crest with minimally displaced fragments.  Orders: 3L LR bolus, 0.3L D5+0.9%NS ggt, Protonix ggt 8mg/hr, Octreotide ggt 50mcg/hr   (18 Jan 2022 15:43)    Allergies    No Known Allergies    Intolerances      Home Medications:    MEDICATIONS:  MEDICATIONS  (STANDING):  octreotide  Infusion 50 MICROgram(s)/Hr (10 mL/Hr) IV Continuous <Continuous>  pantoprazole Infusion 8 mG/Hr (10 mL/Hr) IV Continuous <Continuous>    MEDICATIONS  (PRN):    PAST MEDICAL & SURGICAL HISTORY:  Anxiety    Depression    Alcohol abuse    Iron deficiency anemia, unspecified iron deficiency anemia type    Liver fatty degeneration    No significant past surgical history      FAMILY HISTORY:    SOCIAL HISTORY:  [place]ac[place]: [ ] Current, [ ] Former, [ ] Never; Pack Years:  Alcohol:  Illicit Drugs:    REVIEW OF SYSTEMS:  CONSTITUTIONAL: No weakness, fevers or chills  HEENT: No visual changes; No vertigo or throat pain   NECK: No pain or stiffness  RESPIRATORY: No cough, wheezing, hemoptysis; No shortness of breath  CARDIOVASCULAR: No chest pain or palpitations  GASTROINTESTINAL: No abdominal or epigastric pain. No nausea, vomiting, or hematemesis; No diarrhea or constipation. No melena or hematochezia.  GENITOURINARY: No dysuria, frequency or hematuria  NEUROLOGICAL: No numbness or weakness  SKIN: No itching, burning, rashes, or lesions   All other 10 review of systems is negative unless indicated above.    Vital Signs Last 24 Hrs  T(C): 37.1 (18 Jan 2022 14:30), Max: 37.2 (18 Jan 2022 12:52)  T(F): 98.7 (18 Jan 2022 14:30), Max: 99 (18 Jan 2022 12:52)  HR: 115 (18 Jan 2022 14:30) (86 - 115)  BP: 141/86 (18 Jan 2022 14:30) (80/40 - 141/86)  BP(mean): --  RR: 18 (18 Jan 2022 14:30) (18 - 20)  SpO2: 98% (18 Jan 2022 14:30) (96% - 99%)    01-18 @ 07:01  -  01-18 @ 16:19  --------------------------------------------------------  IN: 0 mL / OUT: 450 mL / NET: -450 mL        PHYSICAL EXAM:    General: middle aged female, no acute distress  Eyes: Anicteric sclerae, moist conjunctivae  HENT: Moist mucous membranes  Neck: Trachea midline, supple  Abdomen: Soft, mildy TTP, non-distended; Normal bowel sounds; No rebound or guarding  Extremities: ecchymosis b/l lower extremities  Neurological: Alert and oriented x3  Skin: Warm and dry. No obvious rash    LABS:                        9.2    3.99  )-----------( 60       ( 18 Jan 2022 11:22 )             27.5     01-18    142  |  97  |  15  ----------------------------<  234<H>  3.0<L>   |  17<L>  |  1.95<H>    Ca    8.0<L>      18 Jan 2022 11:22  Mg     2.9     01-18    TPro  5.7<L>  /  Alb  3.0<L>  /  TBili  6.7<H>  /  DBili  x   /  AST  605<H>  /  ALT  193<H>  /  AlkPhos  107  01-18        PT/INR - ( 18 Jan 2022 07:05 )   PT: 14.6 sec;   INR: 1.23          PTT - ( 18 Jan 2022 07:05 )  PTT:33.0 sec    RADIOLOGY & ADDITIONAL STUDIES:     Reviewed

## 2022-01-18 NOTE — CONSULT NOTE ADULT - ASSESSMENT
Patient is a 47 year old female with pmhx of etoh abuse (0.5L/day whiskey) presenting with 3 days of persistent vomiting with intermittent "coffee ground" emesis.       #coffee ground emesis  - per patient vomiting persistently for last 3 days with intermittent "coffee ground" emesis. Etiology such as a [place] Singletary tear vs erosive esophagitis vs esophageal varices  - MELD score 22   - Hgb did downtrending 11.5 --> 6.8 likely concentrated i/s/o fall, now 9.2  - Maintain active T&S, large bore IV access  - Transfusion threshold per primary team  - EGD tomorrow 1/19   - NPO after midnight  - Maddrey 18.6  - CIWA 0 as of now, frequent CIWA checks   - continue PPI gtt, octreotide gtt   - folic acid, MVI, thiamine    #Transaminitis i/s/o Etoh abuse  - , , ALP WNL  - T bili 6.7  - given EtOH hx and elevated LFTs cannot rule out alcohol hepatitis vs liver cirrhosis   - continue trend CMP daily    Patient is a 47 year old female with a Hx of EtOH abuse (0.5L/day whiskey) presenting with 3 days of persistent vomiting with intermittent "coffee ground" emesis. GI consulted for concern for GI hemorrhage.    Macrocytic anemia - Likely component of acute GI blood loss anemia coupled with nutritional deficiencies. Has had similar episodes in the past though cannot recall last upper endoscopy. Presented with Hgb of 11.5 but has now decreased to 6.8, likely reflective of hydration given for hypothermia and hypotension. Repeat without transfusion of 9.2, more consistent with patient's baseline, though still lower than previous. Etiology presumably MW tear vs erosive esophagitis or gastritis, less likely varices as no clear evidence of cirrhosis, but may be masked by element of alc hep. Prior and current imaging reviewed. No ascites, but hepatomegaly with normal spleen size.  - B12 and folic acid levels  - Thiamine, Folate, MVT supplementation  - Given concern for portal hypertension in the setting of repeat episodes of alcoholic hepatitis, would treat empirically for varices  - PPI infusion and Octreotide infusion  - Maintain active T&S, large bore IV access  - Transfusion threshold per primary team  - Clear liquid diet tonight with NPO for tentative EGD tomorrow morning  - CIWA 0 as of now, frequent CIWA checks, though last drink reportedly earlier today (EtOH <3?)    Elevated LTs - In the setting of active alcohol use. Question of underlying steatosis vs cirrhosis, but no clear evidence of decompensation (no ascites, no known history of variceal hemorrhage, no documented hepatic encephalopathy, no splenomegaly on CT). Current elevation with AST>ALT, elevated bili, and coagulopathy. With hepatomegaly on CT constellation of findings suggestive of alcoholic hepatitis.   - Maddrey Discriminant Function score of 15 on admission suggestive of good prognosis  - INR and CMP daily  - SW for EtOH cessation  - Nutrition evaluation  - UTox  - Trend CK given AST elevation and UA findings suggestive of possible Rhabdomyolysis after fall    GI Fellow Addendum: I have seen and examined the patient and agree with the subjective and objective information as above, as well as the assessment and plan which I have edited as needed.    Recommendations discussed with primary team  Case discussed with attending physician

## 2022-01-18 NOTE — H&P ADULT - NSHPPHYSICALEXAM_GEN_ALL_CORE
.  VITAL SIGNS:  T(C): 37.1 (01-18-22 @ 14:30), Max: 37.2 (01-18-22 @ 12:52)  T(F): 98.7 (01-18-22 @ 14:30), Max: 99 (01-18-22 @ 12:52)  HR: 115 (01-18-22 @ 14:30) (86 - 115)  BP: 141/86 (01-18-22 @ 14:30) (80/40 - 141/86)  BP(mean): --  RR: 18 (01-18-22 @ 14:30) (18 - 20)  SpO2: 98% (01-18-22 @ 14:30) (96% - 99%)  Wt(kg): --    PHYSICAL EXAM:    Constitutional: WDWN resting comfortably in bed; NAD  Head: NC/AT  Eyes: PERRL, EOMI, clear conjunctiva  ENT: no nasal discharge; uvula midline, no oropharyngeal erythema or exudates; MMM  Neck: supple; no JVD or thyromegaly  Respiratory: CTA B/L; no W/R/R, no retractions  Cardiac: +S1/S2; RRR; no M/R/G;  Gastrointestinal: soft, NT/ND; no rebound or guarding; +BSx4  Extremities: WWP, no clubbing or cyanosis; no peripheral edema  Musculoskeletal: NROM x4; no joint swelling, tenderness or erythema  Vascular: 2+ radial, femoral, DP/PT pulses B/L  Dermatologic: skin warm, dry and intact; no rashes, wounds, or scars  Neurologic: AAOx3; CNII-XII grossly intact; no focal deficits  Psychiatric: affect and characteristics of appearance, verbalizations, behaviors are appropriate VITAL SIGNS:  T(C): 37.1 (01-18-22 @ 14:30), Max: 37.2 (01-18-22 @ 12:52)  T(F): 98.7 (01-18-22 @ 14:30), Max: 99 (01-18-22 @ 12:52)  HR: 115 (01-18-22 @ 14:30) (86 - 115)  BP: 141/86 (01-18-22 @ 14:30) (80/40 - 141/86)  BP(mean): --  RR: 18 (01-18-22 @ 14:30) (18 - 20)  SpO2: 98% (01-18-22 @ 14:30) (96% - 99%)  Wt(kg): --    PHYSICAL EXAM:  Constitutional: WDWN resting comfortably in bed; NAD  Head: NC/AT  Eyes: PERRL, EOMI, scleral icterus  ENT: no nasal discharge; uvula midline, no oropharyngeal erythema or exudates; MMM  Neck: supple; no JVD or thyromegaly  Respiratory: CTA B/L; no W/R/R, no retractions  Cardiac: +S1/S2; RRR; no M/R/G;  Gastrointestinal: soft, diffusely tender, ND; no rebound or guarding; +BSx4  Extremities: WWP, no clubbing or cyanosis; no peripheral edema  Musculoskeletal: NROM x4; no joint swelling, tenderness or erythema  Vascular: 2+ radial, femoral, DP/PT pulses B/L, prolonged bleeding from IV sites  Dermatologic: skin jaundiced, warm, dry and intact; diffuse ecchymosis  Neurologic: AAOx3; CNII-XII grossly intact; no focal deficits  Psychiatric: affect and characteristics of appearance, verbalizations, behaviors are appropriate

## 2022-01-18 NOTE — ED PROVIDER NOTE - OBJECTIVE STATEMENT
47 yof pw vomiting x 2 days, w/ dark emesis and dark stool.  hx of pancreatitis/alcohol abuse (daily drinker).  no hx of known cirrhosis.  EMS reports pt hypotensive in field.

## 2022-01-18 NOTE — H&P ADULT - PROBLEM SELECTOR PLAN 3
On admission, lipase 976 and diffuse abdominal pain, CT findings negative. Lactate 7.3 > 1.9, no signs of end organ failure, patient not tolerated PO intake with nausea and vomiting  - f/u BClx and UClx

## 2022-01-18 NOTE — H&P ADULT - NSHPLABSRESULTS_GEN_ALL_CORE
LABS:                         9.2    3.99  )-----------( 60       ( 2022 11:22 )             27.5         142  |  97  |  15  ----------------------------<  234<H>  3.0<L>   |  17<L>  |  1.95<H>    Ca    8.0<L>      2022 11:22  Mg     2.9         TPro  5.7<L>  /  Alb  3.0<L>  /  TBili  6.7<H>  /  DBili  x   /  AST  605<H>  /  ALT  193<H>  /  AlkPhos  107      PT/INR - ( 2022 07:05 )   PT: 14.6 sec;   INR: 1.23          PTT - ( 2022 07:05 )  PTT:33.0 sec  Urinalysis Basic - ( 2022 08:41 )    Color: Yellow / Appearance: Clear / S.020 / pH: x  Gluc: x / Ketone: >=80 mg/dL  / Bili: Small / Urobili: 0.2 E.U./dL   Blood: x / Protein: 100 mg/dL / Nitrite: NEGATIVE   Leuk Esterase: NEGATIVE / RBC: < 5 /HPF / WBC 5-10 /HPF   Sq Epi: x / Non Sq Epi: 5-10 /HPF / Bacteria: Present /HPF      CARDIAC MARKERS ( 2022 09:00 )  x     / x     / 369 U/L / x     / x      CARDIAC MARKERS ( 2022 07:52 )  x     / x     / 447 U/L / x     / x            Lactate, Blood: 6.5 mmoL/L ( @ 09:00)  Lactate, Blood: 7.3 mmoL/L ( @ 07:22)      RADIOLOGY, EKG & ADDITIONAL TESTS:

## 2022-01-18 NOTE — CONSULT NOTE ADULT - ATTENDING COMMENTS
Patient is a 47 year old female with a Hx of EtOH abuse (0.5L/day whiskey) presenting with 3 days of persistent vomiting with intermittent "coffee ground" emesis. GI consulted for concern for GI hemorrhage.    Macrocytic anemia - Likely component of acute GI blood loss anemia coupled with nutritional deficiencies. Has had similar episodes in the past though cannot recall last upper endoscopy. Presented with Hgb of 11.5 but has now decreased to 6.8, likely reflective of hydration given for hypothermia and hypotension. Repeat without transfusion of 9.2, more consistent with patient's baseline, though still lower than previous. Etiology presumably MW tear vs erosive esophagitis or gastritis, less likely varices as no clear evidence of cirrhosis, but may be masked by element of alc hep. Prior and current imaging reviewed. No ascites, but hepatomegaly with normal spleen size.  - B12 and folic acid levels  - Thiamine, Folate, MVT supplementation  - Given concern for portal hypertension in the setting of repeat episodes of alcoholic hepatitis, would treat empirically for varices  - PPI infusion and Octreotide infusion  - Maintain active T&S, large bore IV access  - Transfusion threshold per primary team  - Clear liquid diet tonight with NPO for tentative EGD tomorrow morning  - CIWA 0 as of now, frequent CIWA checks, though last drink reportedly earlier today (EtOH <3?)    Elevated LTs - In the setting of active alcohol use. Question of underlying steatosis vs cirrhosis, but no clear evidence of decompensation (no ascites, no known history of variceal hemorrhage, no documented hepatic encephalopathy, no splenomegaly on CT). Current elevation with AST>ALT, elevated bili, and coagulopathy. With hepatomegaly on CT constellation of findings suggestive of alcoholic hepatitis.   - Maddrey Discriminant Function score of 15 on admission suggestive of good prognosis  - INR and CMP daily  - SW for EtOH cessation  - Nutrition evaluation  - UTox  - Trend CK given AST elevation and UA findings suggestive of possible Rhabdomyolysis after fall

## 2022-01-18 NOTE — ED PROVIDER NOTE - CLINICAL SUMMARY MEDICAL DECISION MAKING FREE TEXT BOX
see attending attestation 48 y/o F with PMH of alcohol dependence, pancreatitis, cirrhosis presents to ED s/p multiple falls, UGIB, c/o chest pain.   Pt arrives hypotensive (80's systolic) and hypothermia (core temp 90.3).  Nl Sats and HR 80 NSR.  Pt given PPI, IVFs and warming blanket upon arrival.  Pt presents with ecchymosis of all extremities and back in multiple stages of healing suggesting multiple and frequent falls.  Labs are notable for nl WBC and H&H 11/34.  Pt is thrombocytopenic, platelets 93.  Pt has anion gap 40, bicar 8.  AL phos 155, , lipase 976.

## 2022-01-18 NOTE — PATIENT PROFILE ADULT - FALL HARM RISK - HARM RISK INTERVENTIONS
Assistance with ambulation/Assistance OOB with selected safe patient handling equipment/Communicate Risk of Fall with Harm to all staff/Monitor for mental status changes/Monitor gait and stability/Reinforce activity limits and safety measures with patient and family/Tailored Fall Risk Interventions/Toileting schedule using arm’s reach rule for commode and bathroom/Use of alarms - bed, chair and/or voice tab/Visual Cue: Yellow wristband and red socks/Bed in lowest position, wheels locked, appropriate side rails in place/Call bell, personal items and telephone in reach/Instruct patient to call for assistance before getting out of bed or chair/Non-slip footwear when patient is out of bed/[place] to call system/Physically safe environment - no spills, clutter or unnecessary equipment/Purposeful Proactive Rounding/Room/bathroom lighting operational, light cord in reach

## 2022-01-18 NOTE — H&P ADULT - PROBLEM/PLAN-3
HR=83 bpm, LERH=402/69 mmhg, SpO2=95.0 %, Resp=12 B/min, EtCO2=36 mmHg, Apnea=8 Seconds, Perez=2 DISPLAY PLAN FREE TEXT

## 2022-01-18 NOTE — H&P ADULT - PROBLEM SELECTOR PLAN 2
Chronic use of 0.5-1L of vodka daily. Denies a history of alcohol withdrawal seizures or ICU stays.   - PRN Ativan for CIWA >8

## 2022-01-18 NOTE — ED ADULT NURSE NOTE - OBJECTIVE STATEMENT
Pt awake but poor historian, endorses hx of pancreatitis and daily alcohol use. States last drink was scotch this morning. Pt states she had a trip and fall x4 hours ago, hitting head, denies use of blood thinners. Pt c/o generalized abdominal discomfort, nausea, coffee ground emesis and dark/loose stools x2 days. Dried blood noted to pts facial lips. Pt appears jaundice and pale. Cool to touch.

## 2022-01-18 NOTE — ED BEHAVIORAL HEALTH NOTE - BEHAVIORAL HEALTH NOTE
SW consulted with Provider regarding PT DH vulnerable status and to inquire if PT has any social needs at this time.  Provider reported PT is unable to have psychosocial assessment due to severity of illness.  Team made aware and SW made available for further assistance.

## 2022-01-18 NOTE — H&P ADULT - PROBLEM SELECTOR PLAN 7
Acute GI blood loss anemia coupled with nutritional deficiencies. Has had similar episodes in the past though cannot recall last upper endoscopy. Presented with Hgb of 11.5 but has now decreased to 6.8, likely reflective of hydration given for hypothermia and hypotension. Repeat without transfusion of 9.2, more consistent with patient's baseline, though still lower than previous. Etiology presumably MW tear vs erosive esophagitis or gastritis, less likely varices as no clear evidence of cirrhosis, but may be masked by element of alc hep. Prior and current imaging reviewed. No ascites, but hepatomegaly with normal spleen size.  - B12 and folic acid levels  - Thiamine, Folate, MVT supplementation  - Given concern for portal hypertension in the setting of repeat episodes of alcoholic hepatitis, would treat empirically for varices  - PPI infusion and Octreotide infusion  - Maintain active T&S, large bore IV access  - Transfusion threshold per primary team  - Clear liquid diet tonight with NPO for tentative EGD tomorrow morning  - CIWA 0 as of now, frequent CIWA checks, though last drink reportedly earlier today (EtOH <3?)

## 2022-01-18 NOTE — H&P ADULT - PROBLEM SELECTOR PLAN 10
F: None   E: Replete as necessary K>4 Mg>2  N: NPO MN, EGD AM     DVT Prophylaxis: None  GI prophylaxis: Protonix ggt  CODE STATUS: FULL

## 2022-01-18 NOTE — ED PROVIDER NOTE - CARE PLAN
1 Principal Discharge DX:	Hematemesis/vomiting blood   Principal Discharge DX:	Hematemesis/vomiting blood  Secondary Diagnosis:	Body temperature low  Secondary Diagnosis:	Hypotension   Principal Discharge DX:	Hematemesis/vomiting blood  Secondary Diagnosis:	Body temperature low  Secondary Diagnosis:	Hypotension  Secondary Diagnosis:	Lactic acidosis   Principal Discharge DX:	Severe thrombocytopenia  Secondary Diagnosis:	Starvation ketoacidosis  Secondary Diagnosis:	Transaminitis  Secondary Diagnosis:	Closed fracture of right iliac crest  Secondary Diagnosis:	Alcohol withdrawal

## 2022-01-18 NOTE — ED ADULT NURSE NOTE - NSICDXPASTMEDICALHX_GEN_ALL_CORE_FT
PAST MEDICAL HISTORY:  Alcohol abuse     Anxiety     Depression     Iron deficiency anemia, unspecified iron deficiency anemia type     Liver fatty degeneration

## 2022-01-18 NOTE — H&P ADULT - HISTORY OF PRESENT ILLNESS
HPI:        Of note,        Patient last admitted,      In the ED,  VS: T90.1F/32.3C (rectal), HR 89, BP 80/40, RR 18, SpO2 98% RA  Abnormal Labs: Hgb 11.5>6.8>9.2, , PLTs 93 > 60, K 3.2, Cl 94, HCO3 8, AG 40, Cr 2.73, Glucose 137>234 (1203 reading was contamination from D5 ggt), Sosm 329, Ca 9.1>5.9>8.0, TBili 7.2 (DBili 5.3, IBili 1.9), , AST//276, Lipase 976, , VBG 7.08/38/<35, Lactate 7.3>6.5  UA: Small Bili, >80 ketones, 100 protein, large blood, 5-10 WBCs, Many hyaline casts  EKG:  CXR: WNL  CTH: Right parieto-occipital scalp soft tissue swelling with underlying hematoma. No intracranial hemorrhage or calvarial fracture.  CT: C-spine: No acute fracture or joint dislocation involving the cervical spine.  CTAbd: Acute comminuted fracture posterior right iliac crest. No acute visceral organ injury. Gas-filled mildly distended small bowel loops suggesting ileus. Marked hepatomegaly and steatosis.  CT L-spine: Acute comminuted fractures of the right posterior iliac crest with minimally displaced fragments.  Orders: 3L LR bolus, 0.3L D5+0.9%NS ggt, Protonix ggt 8mg/hr, Octreotide ggt 50mcg/hr   HPI:        Of note,        Patient last admitted,      In the ED,  VS: T90.1F/32.3C (rectal), HR 89, BP 80/40, RR 18, SpO2 98% RA  Abnormal Labs: Hgb 11.5>6.8>9.2, , PLTs 93 > 60, K 3.2, Cl 94, HCO3 8, AG 40, Cr 2.73, Glucose 137>234 (1203 reading was contamination from D5 ggt), Sosm 329, Ca 9.1>5.9>8.0, TBili 7.2 (DBili 5.3, IBili 1.9), , AST//276, Lipase 976, , VBG 7.08/38/<35, Lactate 7.3>6.5  UA: Small Bili, >80 ketones, 100 protein, large blood, 5-10 WBCs, Many hyaline casts  EKG:  CXR: WNL  CTH: Right parieto-occipital scalp soft tissue swelling with underlying hematoma. No intracranial hemorrhage or calvarial fracture.  CT: C-spine: No acute fracture or joint dislocation involving the cervical spine.  CTAbd: Acute comminuted fracture posterior right iliac crest. No acute visceral organ injury. Gas-filled mildly distended small bowel loops suggesting ileus. Marked hepatomegaly and steatosis.  CT L-spine: Acute comminuted fractures of the right posterior iliac crest with minimally displaced fragments.  Orders: 3L LR bolus, 0.3L D5+0.9%NS ggt, Protonix ggt 8mg/hr, Octreotide ggt 50mcg/hr, NaHCO3 6e85kWb, KCl 4f32bVf, Vanc 1g, Zosyn 3.375g, CTX 1g, Diazepam 2x5mg   HPI: 47F PMHx of chronic alcoholism, anxiety, depression presenting with nausea and vomiting roughly every 2 hours since Sunday. She has a history of severe alcohol abuse for which she drinks 0.5L of Whiskey per day but less since her symptoms began. During this time span she has been unable to keep up with PO liquids or foods, no food since Sunday. Reports to vomiting some brown "coffee ground" liquid. Endorses intermittent lower abdominal pain and nausea and vomitting. Denies diarrhea and ROS otherwise negative. The patient denies history of alcohol withdrawal related seizures or ICU stays. Also denies history of pancreatitis. GI was consulted for coffee ground emesis.     Patient last admitted, 6/30/21-7/2/21 for acute pancreatitis & alcohol withdrawal.      In the ED,  VS: T90.1F/32.3C (rectal), HR 89, BP 80/40, RR 18, SpO2 98% RA  Abnormal Labs: Hgb 11.5>6.8>9.2, , PLTs 93 > 60, K 3.2, Cl 94, HCO3 8, AG 40, Cr 2.73, Glucose 137>234 (1203 reading was contamination from D5 ggt), Sosm 329, Ca 9.1>5.9>8.0, TBili 7.2 (DBili 5.3, IBili 1.9), , AST//276, Lipase 976, , VBG 7.08/38/<35, Lactate 7.3>6.5  UA: Small Bili, >80 ketones, 100 protein, large blood, 5-10 WBCs, Many hyaline casts  EKG: Qtc prolongation, no U waves  CXR: WNL  CTH: Right parieto-occipital scalp soft tissue swelling with underlying hematoma. No intracranial hemorrhage or calvarial fracture.  CT: C-spine: No acute fracture or joint dislocation involving the cervical spine.  CTAbd: Acute comminuted fracture posterior right iliac crest. No acute visceral organ injury. Gas-filled mildly distended small bowel loops suggesting ileus. Marked hepatomegaly and steatosis.  CT L-spine: Acute comminuted fractures of the right posterior iliac crest with minimally displaced fragments.  Orders: 3L LR bolus, 0.3L D5+0.9%NS ggt, Protonix ggt 8mg/hr, Octreotide ggt 50mcg/hr, NaHCO3 9o61sPc, KCl 5q31sSk, Vanc 1g, Zosyn 3.375g, CTX 1g, Diazepam 2x5mg

## 2022-01-18 NOTE — ED PROVIDER NOTE - CRITICAL CARE ATTENDING CONTRIBUTION TO CARE
critical care provided, 35 minutes spent with patient w/ obtaining history, reviewing test results, and consultation w/ other physicians     47 yof pw vomiting x 2 days, dark/"coffee ground", w/ dark stool.  hx of pancreatitis/alcohol abuse (drinks daily but did not do so today bc vomiting).  no diarrhea.  no fc/cough.  no prior abd surg.  admitted to [facility] for similar in July 2021 for pancreatitis (possibly due to alcohol abuse), did not have endoscopy.      Pt is pale conjunctiva, +jaundice, will check labs, no prior documented hx of cirrhosis, no evidence of sbp on exam, will start protonix/drip, bl IV, hypotensive in field, fluid resuscitation to start,

## 2022-01-19 LAB
A1C WITH ESTIMATED AVERAGE GLUCOSE RESULT: <4.2 % — SIGNIFICANT CHANGE UP (ref 4–5.6)
ALBUMIN SERPL ELPH-MCNC: 3.4 G/DL — SIGNIFICANT CHANGE UP (ref 3.3–5)
ALP SERPL-CCNC: 89 U/L — SIGNIFICANT CHANGE UP (ref 40–120)
ALT FLD-CCNC: 132 U/L — HIGH (ref 10–45)
ANION GAP SERPL CALC-SCNC: 21 MMOL/L — HIGH (ref 5–17)
ANION GAP SERPL CALC-SCNC: 24 MMOL/L — HIGH (ref 5–17)
AST SERPL-CCNC: 298 U/L — HIGH (ref 10–40)
BASOPHILS # BLD AUTO: 0 K/UL — SIGNIFICANT CHANGE UP (ref 0–0.2)
BASOPHILS NFR BLD AUTO: 0 % — SIGNIFICANT CHANGE UP (ref 0–2)
BILIRUB SERPL-MCNC: 3.3 MG/DL — HIGH (ref 0.2–1.2)
BUN SERPL-MCNC: 10 MG/DL — SIGNIFICANT CHANGE UP (ref 7–23)
BUN SERPL-MCNC: 10 MG/DL — SIGNIFICANT CHANGE UP (ref 7–23)
CALCIUM SERPL-MCNC: 8 MG/DL — LOW (ref 8.4–10.5)
CALCIUM SERPL-MCNC: 8.3 MG/DL — LOW (ref 8.4–10.5)
CHLORIDE SERPL-SCNC: 95 MMOL/L — LOW (ref 96–108)
CHLORIDE SERPL-SCNC: 96 MMOL/L — SIGNIFICANT CHANGE UP (ref 96–108)
CO2 SERPL-SCNC: 21 MMOL/L — LOW (ref 22–31)
CO2 SERPL-SCNC: 21 MMOL/L — LOW (ref 22–31)
CREAT SERPL-MCNC: 0.71 MG/DL — SIGNIFICANT CHANGE UP (ref 0.5–1.3)
CREAT SERPL-MCNC: 0.86 MG/DL — SIGNIFICANT CHANGE UP (ref 0.5–1.3)
CULTURE RESULTS: NO GROWTH — SIGNIFICANT CHANGE UP
EOSINOPHIL # BLD AUTO: 0 K/UL — SIGNIFICANT CHANGE UP (ref 0–0.5)
EOSINOPHIL NFR BLD AUTO: 0 % — SIGNIFICANT CHANGE UP (ref 0–6)
ESTIMATED AVERAGE GLUCOSE: <74 MG/DL — SIGNIFICANT CHANGE UP (ref 68–114)
FOLATE SERPL-MCNC: 3.7 NG/ML — LOW
GIANT PLATELETS BLD QL SMEAR: PRESENT — SIGNIFICANT CHANGE UP
GLUCOSE SERPL-MCNC: 226 MG/DL — HIGH (ref 70–99)
GLUCOSE SERPL-MCNC: 277 MG/DL — HIGH (ref 70–99)
HCT VFR BLD CALC: 24.1 % — LOW (ref 34.5–45)
HGB BLD-MCNC: 8 G/DL — LOW (ref 11.5–15.5)
HIV 1+2 AB+HIV1 P24 AG SERPL QL IA: SIGNIFICANT CHANGE UP
HYPOCHROMIA BLD QL: SLIGHT — SIGNIFICANT CHANGE UP
LYMPHOCYTES # BLD AUTO: 0.24 K/UL — LOW (ref 1–3.3)
LYMPHOCYTES # BLD AUTO: 5.6 % — LOW (ref 13–44)
MAGNESIUM SERPL-MCNC: 1.5 MG/DL — LOW (ref 1.6–2.6)
MAGNESIUM SERPL-MCNC: 1.7 MG/DL — SIGNIFICANT CHANGE UP (ref 1.6–2.6)
MANUAL SMEAR VERIFICATION: SIGNIFICANT CHANGE UP
MCHC RBC-ENTMCNC: 32.8 PG — SIGNIFICANT CHANGE UP (ref 27–34)
MCHC RBC-ENTMCNC: 33.2 GM/DL — SIGNIFICANT CHANGE UP (ref 32–36)
MCV RBC AUTO: 98.8 FL — SIGNIFICANT CHANGE UP (ref 80–100)
MONOCYTES # BLD AUTO: 0.12 K/UL — SIGNIFICANT CHANGE UP (ref 0–0.9)
MONOCYTES NFR BLD AUTO: 2.8 % — SIGNIFICANT CHANGE UP (ref 2–14)
NEUTROPHILS # BLD AUTO: 3.98 K/UL — SIGNIFICANT CHANGE UP (ref 1.8–7.4)
NEUTROPHILS NFR BLD AUTO: 89.8 % — HIGH (ref 43–77)
NEUTS BAND # BLD: 1.8 % — SIGNIFICANT CHANGE UP (ref 0–8)
OSMOLALITY UR: 305 MOSM/KG — SIGNIFICANT CHANGE UP (ref 50–1200)
PHOSPHATE SERPL-MCNC: 1.2 MG/DL — LOW (ref 2.5–4.5)
PLAT MORPH BLD: ABNORMAL
PLATELET # BLD AUTO: 52 K/UL — LOW (ref 150–400)
POTASSIUM SERPL-MCNC: 3.1 MMOL/L — LOW (ref 3.5–5.3)
POTASSIUM SERPL-MCNC: 4 MMOL/L — SIGNIFICANT CHANGE UP (ref 3.5–5.3)
POTASSIUM SERPL-SCNC: 3.1 MMOL/L — LOW (ref 3.5–5.3)
POTASSIUM SERPL-SCNC: 4 MMOL/L — SIGNIFICANT CHANGE UP (ref 3.5–5.3)
PROT SERPL-MCNC: 5.6 G/DL — LOW (ref 6–8.3)
RBC # BLD: 2.44 M/UL — LOW (ref 3.8–5.2)
RBC # FLD: 15.4 % — HIGH (ref 10.3–14.5)
RBC BLD AUTO: ABNORMAL
SMUDGE CELLS # BLD: PRESENT — SIGNIFICANT CHANGE UP
SODIUM SERPL-SCNC: 138 MMOL/L — SIGNIFICANT CHANGE UP (ref 135–145)
SODIUM SERPL-SCNC: 140 MMOL/L — SIGNIFICANT CHANGE UP (ref 135–145)
SPECIMEN SOURCE: SIGNIFICANT CHANGE UP
T PALLIDUM AB TITR SER: NEGATIVE — SIGNIFICANT CHANGE UP
TSH SERPL-MCNC: 0.53 UIU/ML — SIGNIFICANT CHANGE UP (ref 0.27–4.2)
VIT B12 SERPL-MCNC: >2000 PG/ML — HIGH (ref 232–1245)
WBC # BLD: 4.35 K/UL — SIGNIFICANT CHANGE UP (ref 3.8–10.5)
WBC # FLD AUTO: 4.35 K/UL — SIGNIFICANT CHANGE UP (ref 3.8–10.5)

## 2022-01-19 PROCEDURE — 43235 EGD DIAGNOSTIC BRUSH WASH: CPT

## 2022-01-19 PROCEDURE — 99233 SBSQ HOSP IP/OBS HIGH 50: CPT | Mod: GC

## 2022-01-19 PROCEDURE — 93010 ELECTROCARDIOGRAM REPORT: CPT

## 2022-01-19 RX ORDER — POTASSIUM CHLORIDE 20 MEQ
40 PACKET (EA) ORAL ONCE
Refills: 0 | Status: COMPLETED | OUTPATIENT
Start: 2022-01-19 | End: 2022-01-19

## 2022-01-19 RX ORDER — POTASSIUM PHOSPHATE, MONOBASIC POTASSIUM PHOSPHATE, DIBASIC 236; 224 MG/ML; MG/ML
30 INJECTION, SOLUTION INTRAVENOUS ONCE
Refills: 0 | Status: COMPLETED | OUTPATIENT
Start: 2022-01-19 | End: 2022-01-19

## 2022-01-19 RX ORDER — MAGNESIUM SULFATE 500 MG/ML
4 VIAL (ML) INJECTION ONCE
Refills: 0 | Status: COMPLETED | OUTPATIENT
Start: 2022-01-19 | End: 2022-01-19

## 2022-01-19 RX ORDER — PANTOPRAZOLE SODIUM 20 MG/1
40 TABLET, DELAYED RELEASE ORAL
Refills: 0 | Status: DISCONTINUED | OUTPATIENT
Start: 2022-01-19 | End: 2022-01-24

## 2022-01-19 RX ORDER — SUCRALFATE 1 G
1 TABLET ORAL EVERY 6 HOURS
Refills: 0 | Status: DISCONTINUED | OUTPATIENT
Start: 2022-01-19 | End: 2022-01-25

## 2022-01-19 RX ORDER — POTASSIUM CHLORIDE 20 MEQ
40 PACKET (EA) ORAL
Refills: 0 | Status: COMPLETED | OUTPATIENT
Start: 2022-01-19 | End: 2022-01-19

## 2022-01-19 RX ORDER — POTASSIUM CHLORIDE 20 MEQ
10 PACKET (EA) ORAL
Refills: 0 | Status: COMPLETED | OUTPATIENT
Start: 2022-01-19 | End: 2022-01-19

## 2022-01-19 RX ORDER — SODIUM CHLORIDE 9 MG/ML
1000 INJECTION, SOLUTION INTRAVENOUS ONCE
Refills: 0 | Status: COMPLETED | OUTPATIENT
Start: 2022-01-19 | End: 2022-01-19

## 2022-01-19 RX ORDER — MAGNESIUM SULFATE 500 MG/ML
2 VIAL (ML) INJECTION ONCE
Refills: 0 | Status: DISCONTINUED | OUTPATIENT
Start: 2022-01-19 | End: 2022-01-21

## 2022-01-19 RX ADMIN — Medication 1 TABLET(S): at 11:12

## 2022-01-19 RX ADMIN — PANTOPRAZOLE SODIUM 10 MG/HR: 20 TABLET, DELAYED RELEASE ORAL at 05:02

## 2022-01-19 RX ADMIN — Medication 105 MILLIGRAM(S): at 13:23

## 2022-01-19 RX ADMIN — Medication 100 MILLIEQUIVALENT(S): at 02:50

## 2022-01-19 RX ADMIN — Medication 40 MILLIEQUIVALENT(S): at 01:38

## 2022-01-19 RX ADMIN — Medication 100 MILLIEQUIVALENT(S): at 01:36

## 2022-01-19 RX ADMIN — Medication 100 MILLIEQUIVALENT(S): at 04:04

## 2022-01-19 RX ADMIN — SODIUM CHLORIDE 2000 MILLILITER(S): 9 INJECTION, SOLUTION INTRAVENOUS at 22:40

## 2022-01-19 RX ADMIN — Medication 105 MILLIGRAM(S): at 05:42

## 2022-01-19 RX ADMIN — Medication 25 GRAM(S): at 09:03

## 2022-01-19 RX ADMIN — Medication 1 MILLIGRAM(S): at 11:12

## 2022-01-19 RX ADMIN — Medication 650 MILLIGRAM(S): at 01:06

## 2022-01-19 RX ADMIN — Medication 100 MILLIGRAM(S): at 01:37

## 2022-01-19 RX ADMIN — Medication 105 MILLIGRAM(S): at 21:56

## 2022-01-19 RX ADMIN — OCTREOTIDE ACETATE 10 MICROGRAM(S)/HR: 200 INJECTION, SOLUTION INTRAVENOUS; SUBCUTANEOUS at 04:36

## 2022-01-19 RX ADMIN — Medication 40 MILLIEQUIVALENT(S): at 10:19

## 2022-01-19 RX ADMIN — PANTOPRAZOLE SODIUM 40 MILLIGRAM(S): 20 TABLET, DELAYED RELEASE ORAL at 17:58

## 2022-01-19 RX ADMIN — Medication 40 MILLIEQUIVALENT(S): at 08:33

## 2022-01-19 RX ADMIN — POTASSIUM PHOSPHATE, MONOBASIC POTASSIUM PHOSPHATE, DIBASIC 83.33 MILLIMOLE(S): 236; 224 INJECTION, SOLUTION INTRAVENOUS at 09:03

## 2022-01-19 RX ADMIN — Medication 1 GRAM(S): at 17:58

## 2022-01-19 RX ADMIN — Medication 105 MILLIGRAM(S): at 00:41

## 2022-01-19 NOTE — CHART NOTE - NSCHARTNOTEFT_GEN_A_CORE
Patient had a/an EGD with Dr. Man in the Endoscopy suite for coffee ground emesis and anemia. Please refer to "Results" tab or paper chart for full report.    RECOMMENDATIONS  Resume soft diet as tolerated  Continue PPI BID and Carafate solution 1g Q6H   Patient should have outpatient follow-up for repeat EGD in 8 weeks  Care otherwise as per primary medical team

## 2022-01-19 NOTE — PROGRESS NOTE ADULT - SUBJECTIVE AND OBJECTIVE BOX
**INCOMPLETE NOTE    OVERNIGHT EVENTS:    SUBJECTIVE:  Patient seen and examined at bedside.    Vital Signs Last 12 Hrs  T(F): 99.1 (22 @ 06:02), Max: 100.8 (22 @ 22:08)  HR: 78 (22 @ 04:12) (78 - 108)  BP: 117/78 (22 @ 04:12) (85/55 - 130/79)  BP(mean): 93 (22 @ 04:12) (65 - 99)  RR: 18 (22 @ 04:12) (18 - 18)  SpO2: 97% (22 @ 04:12) (96% - 99%)  I&O's Summary    2022 07:01  -  2022 07:00  --------------------------------------------------------  IN: 1508 mL / OUT: 3300 mL / NET: -1792 mL        PHYSICAL EXAM:  Constitutional: NAD, comfortable in bed.  HEENT: NC/AT, PERRLA, EOMI, no conjunctival pallor or scleral icterus, MMM  Neck: Supple, no JVD  Respiratory: CTA B/L. No w/r/r.   Cardiovascular: RRR, normal S1 and S2, no m/r/g.   Gastrointestinal: +BS, soft NTND, no guarding or rebound tenderness, no palpable masses   Extremities: wwp; no cyanosis, clubbing or edema.   Vascular: Pulses equal and strong throughout.   Neurological: AAOx3, no CN deficits, strength and sensation intact throughout.   Skin: No gross skin abnormalities or rashes        LABS:                        8.0    4.35  )-----------( 52       ( 2022 06:35 )             24.1         140  |  95<L>  |  10  ----------------------------<  277<H>  3.1<L>   |  21<L>  |  0.86    Ca    8.0<L>      2022 06:35  Phos  1.2       Mg     1.5         TPro  5.6<L>  /  Alb  3.4  /  TBili  3.3<H>  /  DBili  x   /  AST  298<H>  /  ALT  132<H>  /  AlkPhos  89      PT/INR - ( 2022 16:46 )   PT: 15.5 sec;   INR: 1.31          PTT - ( 2022 16:46 )  PTT:26.3 sec  Urinalysis Basic - ( 2022 08:41 )    Color: Yellow / Appearance: Clear / S.020 / pH: x  Gluc: x / Ketone: >=80 mg/dL  / Bili: Small / Urobili: 0.2 E.U./dL   Blood: x / Protein: 100 mg/dL / Nitrite: NEGATIVE   Leuk Esterase: NEGATIVE / RBC: < 5 /HPF / WBC 5-10 /HPF   Sq Epi: x / Non Sq Epi: 5-10 /HPF / Bacteria: Present /HPF          RADIOLOGY & ADDITIONAL TESTS:    MEDICATIONS  (STANDING):  folic acid 1 milliGRAM(s) Oral daily  influenza   Vaccine 0.5 milliLiter(s) IntraMuscular once  magnesium sulfate  IVPB 4 Gram(s) IV Intermittent once  multivitamin/minerals 1 Tablet(s) Oral daily  octreotide  Infusion 50 MICROgram(s)/Hr (10 mL/Hr) IV Continuous <Continuous>  pantoprazole Infusion 8 mG/Hr (10 mL/Hr) IV Continuous <Continuous>  potassium chloride   Powder 40 milliEquivalent(s) Oral every 2 hours  potassium phosphate IVPB 30 milliMole(s) IV Intermittent once  thiamine IVPB 500 milliGRAM(s) IV Intermittent every 8 hours  trimethobenzamide Injectable 200 milliGRAM(s) IntraMuscular every 6 hours    MEDICATIONS  (PRN):  acetaminophen     Tablet .. 650 milliGRAM(s) Oral every 6 hours PRN Temp greater or equal to 38C (100.4F), Mild Pain (1 - 3)  benzonatate 100 milliGRAM(s) Oral every 8 hours PRN Cough  LORazepam   Injectable 2 milliGRAM(s) IV Push every 2 hours PRN CIWA-Ar score 8 or greater   OVERNIGHT EVENTS: ordered labs for missing osmoles given osmolarity gap; started thiamine, folate, MV; T 100.8 oral, gave tylenol, cultures already pending, gave 1L of LR for BP 90s and dry; tessalon perles for cough; ordered PT for AM as pt having leg discomfort and wld like exercise; repeat K 2.6 ordered another 30 IV K and 40 PO K; ordered tigan for nausea; ordered dementia labs syphilis, hiv for AM for unsteadiness, RUQ US due to elevated direct billirubin, potassium 40 meq solution x2 doses, Magnesium 4 mg IV, potassium phosphate 30 meq IV x1.     SUBJECTIVE:  Patient seen and examined at bedside.  C/o epigastric pain (from prior vomiting) & back pain (from frequent positioning to vomit)    Vital Signs Last 12 Hrs  T(F): 99.1 (22 @ 06:02), Max: 100.8 (22 @ 22:08)  HR: 78 (22 @ 04:12) (78 - 108)  BP: 117/78 (22 @ 04:12) (85/55 - 130/79)  BP(mean): 93 (22 @ 04:12) (65 - 99)  RR: 18 (22 @ 04:12) (18 - 18)  SpO2: 97% (22 @ 04:12) (96% - 99%)  I&O's Summary    2022 07:01  -  2022 07:00  --------------------------------------------------------  IN: 1508 mL / OUT: 3300 mL / NET: -1792 mL    PHYSICAL EXAM:  Constitutional: NAD, comfortable in bed.  HEENT: NC/AT, PERRLA, EOMI, no conjunctival pallor. Scleral icterus, MMM  Neck: Supple, no JVD  Respiratory: CTA B/L. No w/r/r.   Cardiovascular: RRR, normal S1 and S2, no m/r/g.   Gastrointestinal: +BS, soft NTND, no guarding or rebound tenderness, no palpable masses   Extremities: wwp; no cyanosis, clubbing or edema.   Vascular: Pulses equal and strong throughout.   Neurological: AAOx3, no CN deficits, strength and sensation intact throughout.   Skin: Jaundiced, diffuse ecchymosis 2/2 trauma    LABS:                        8.0    4.35  )-----------( 52       ( 2022 06:35 )             24.1     -    140  |  95<L>  |  10  ----------------------------<  277<H>  3.1<L>   |  21<L>  |  0.86    Ca    8.0<L>      2022 06:35  Phos  1.2       Mg     1.5         TPro  5.6<L>  /  Alb  3.4  /  TBili  3.3<H>  /  DBili  x   /  AST  298<H>  /  ALT  132<H>  /  AlkPhos  89      PT/INR - ( 2022 16:46 )   PT: 15.5 sec;   INR: 1.31          PTT - ( 2022 16:46 )  PTT:26.3 sec  Urinalysis Basic - ( 2022 08:41 )    Color: Yellow / Appearance: Clear / S.020 / pH: x  Gluc: x / Ketone: >=80 mg/dL  / Bili: Small / Urobili: 0.2 E.U./dL   Blood: x / Protein: 100 mg/dL / Nitrite: NEGATIVE   Leuk Esterase: NEGATIVE / RBC: < 5 /HPF / WBC 5-10 /HPF   Sq Epi: x / Non Sq Epi: 5-10 /HPF / Bacteria: Present /HPF    RADIOLOGY & ADDITIONAL TESTS:    MEDICATIONS  (STANDING):  folic acid 1 milliGRAM(s) Oral daily  influenza   Vaccine 0.5 milliLiter(s) IntraMuscular once  magnesium sulfate  IVPB 4 Gram(s) IV Intermittent once  multivitamin/minerals 1 Tablet(s) Oral daily  octreotide  Infusion 50 MICROgram(s)/Hr (10 mL/Hr) IV Continuous <Continuous>  pantoprazole Infusion 8 mG/Hr (10 mL/Hr) IV Continuous <Continuous>  potassium chloride   Powder 40 milliEquivalent(s) Oral every 2 hours  potassium phosphate IVPB 30 milliMole(s) IV Intermittent once  thiamine IVPB 500 milliGRAM(s) IV Intermittent every 8 hours  trimethobenzamide Injectable 200 milliGRAM(s) IntraMuscular every 6 hours    MEDICATIONS  (PRN):  acetaminophen     Tablet .. 650 milliGRAM(s) Oral every 6 hours PRN Temp greater or equal to 38C (100.4F), Mild Pain (1 - 3)  benzonatate 100 milliGRAM(s) Oral every 8 hours PRN Cough  LORazepam   Injectable 2 milliGRAM(s) IV Push every 2 hours PRN CIWA-Ar score 8 or greater

## 2022-01-19 NOTE — DIETITIAN NUTRITION RISK NOTIFICATION - TREATMENT: THE FOLLOWING DIET HAS BEEN RECOMMENDED
1. As medically feasible, please start CLD. Advance to Regular with Ensure Max BID as tolerated 2. Monitor lytes and replete prn. POC BG q6hrs 3. Pain and bowel regimens per team 4. RD to provide diet ed at f/u as appropriate   1. As medically feasible, please start CLD. Advance to Regular with Ensure Max BID as tolerated   >>Will attempt to optimize pt's nutritional status w/in 72hrs as she is at high risk for severe protein-calorie malnutrition   2. Monitor lytes and replete prn. POC BG q6hrs   3. Pain and bowel regimens per team   4. RD to provide diet ed at f/u as appropriate   1. As medically feasible, please start CLD. Advance to Regular with Ensure Max BID as tolerated   >>Will aim to optimize pt's nutritional status w/in 72hrs as she is at high risk for severe protein-calorie malnutrition  >>If unable to tolerate PO w/in 72hrs, please consider TPN    2. Monitor lytes and replete prn. POC BG q6hrs   3. Pain and bowel regimens per team   4. RD to provide diet ed at f/u as appropriate

## 2022-01-19 NOTE — DIETITIAN INITIAL EVALUATION ADULT. - PERTINENT LABORATORY DATA
Na 140, K 3.1 (L), Mg 1.5 (L), Phos 1.2 (L), LFTs elevated but trending down, , T.Bili 3.3 (H, trending down)

## 2022-01-19 NOTE — DIETITIAN INITIAL EVALUATION ADULT. - PROBLEM SELECTOR PLAN 1
Few day history of nausea and coffee ground emesis with limited PO intake. Macrocytic anemia.   - On Octreotide & Protonix ggt  - EGD in AM

## 2022-01-19 NOTE — DIETITIAN INITIAL EVALUATION ADULT. - OTHER INFO
47F PMHx of chronic alcoholism (no hx ICU adm or seizures), anxiety, depression presenting alcohol w/d with N/V of coffee ground emesis. Pt seen in room this AM, awake, alert, very pleasant. She reported inconsistent and decreased oral intake over past year or so, sometimes only consuming 1-2 meals per day. Prior to this, her appetite was generally good and she consumed a healthy diet. Her decrease in oral intake is due in part of alcohol consumption, as well as stress at home/marital issues. She endorsed significant wt loss over past year but she was unable to state UBW (per EMR chart review, pt weighed ~60kg in Aug 2020). Currently she is NPO, pending EGD today. Noted on octreotide and pantoprazole gtts. She denied current N/V or pain. BM 1/18. No usual difficulty chewing or swallowing. Skin noted with sacrum stage II pressure injury, no edema. Tmax 100.8F overnight. NFPE completed. Discussed diet advancement pending GI evaluation. No formal diet education provided, but discussed that RD will return to provide diet ed/wt management tips. Will continue to follow per RD protocol.

## 2022-01-19 NOTE — PROGRESS NOTE ADULT - PROBLEM SELECTOR PLAN 7
Acute GI blood loss anemia coupled with nutritional deficiencies. Has had similar episodes in the past though cannot recall last upper endoscopy. Presented with Hgb of 11.5 but has now decreased to 6.8, likely reflective of hydration given for hypothermia and hypotension. Repeat without transfusion of 9.2, more consistent with patient's baseline, though still lower than previous. Etiology presumably MW tear vs erosive esophagitis or gastritis, less likely varices as no clear evidence of cirrhosis, but may be masked by element of alc hep. Prior and current imaging reviewed. No ascites, but hepatomegaly with normal spleen size.  - B12 and folic acid levels  - Thiamine, Folate, MVT supplementation  - Given concern for portal hypertension in the setting of repeat episodes of alcoholic hepatitis, would treat empirically for varices  - PPI infusion and Octreotide infusion  - Maintain active T&S, large bore IV access  - Transfusion threshold per primary team  - Clear liquid diet tonight with NPO for tentative EGD tomorrow morning  - CIWA 0 as of now, frequent CIWA checks, though last drink reportedly earlier today (EtOH <3?) Acute GI blood loss anemia coupled with nutritional deficiencies. Presented with Hgb of 11.5 but has now decreased to 6.8, likely reflective of hydration given for hypothermia and hypotension. Repeat without transfusion of 9.2, more consistent with patient's baseline, though still lower than previous. Etiology presumably MW tear vs erosive esophagitis or gastritis, less likely varices as no clear evidence of cirrhosis, but may be masked by element of alc hep. Prior and current imaging reviewed. No ascites, but hepatomegaly with normal spleen size. B12 elevate, low folate.  - Thiamine, Folate, MVT supplementation  - Given concern for portal hypertension in the setting of repeat episodes of alcoholic hepatitis, would treat empirically for varices  - PPI infusion and Octreotide infusion  - Maintain active T&S, large bore IV access  - Transfusion threshold per primary team  - Clear liquid diet tonight with NPO for tentative EGD tomorrow morning  - CIWA 0 as of now, frequent CIWA checks, though last drink reportedly earlier today (EtOH <3?)

## 2022-01-19 NOTE — DIETITIAN INITIAL EVALUATION ADULT. - OTHER CALCULATIONS
ActualBW used for calculations as pt between % of IBW (86% IBW). Needs estimated for age and adjusted for protein-calorie malnutrition, wound healing.

## 2022-01-19 NOTE — DIETITIAN INITIAL EVALUATION ADULT. - ADD RECOMMEND
1. As medically feasible, please start CLD. Advance to Regular with Ensure Max BID as tolerated 2. Monitor lytes and replete prn. POC BG q6hrs 3. Pain and bowel regimens per team 4. RD to provide diet ed at f/u as appropriate 1. As medically feasible, please start CLD. Advance to Regular with Ensure Max BID as tolerated >>If unable to tolerate PO w/in 72hrs, please consider TPN  2. Monitor lytes and replete prn. POC BG q6hrs 3. Pain and bowel regimens per team 4. RD to provide diet ed at f/u as appropriate

## 2022-01-19 NOTE — PROGRESS NOTE ADULT - PROBLEM SELECTOR PLAN 4
In the setting of active alcohol use. Question of underlying steatosis vs cirrhosis, but no clear evidence of decompensation (no ascites, no known history of variceal hemorrhage, no documented hepatic encephalopathy, no splenomegaly on CT). Current elevation with AST>ALT, elevated bili, and coagulopathy. CT: Hepatic steatosis.  - Maddrey Discriminant Function score of 15 on admission suggestive of good prognosis  - INR and CMP daily  - SW for EtOH cessation  - Nutrition evaluation  - UTox  - Trend CK given AST elevation and UA findings suggestive of possible Rhabdomyolysis after fall In the setting of active alcohol use. Underlying steatosis vs cirrhosis, but no clear evidence of decompensation (no ascites, no known history of variceal hemorrhage, no documented hepatic encephalopathy, no splenomegaly on CT). Current elevation with AST>ALT, elevated bili, and coagulopathy. CT: Hepatic steatosis. Utox negative.  - Maddrey Discriminant Function score of 15 on admission suggestive of good prognosis  - INR and CMP daily  - Trend CK given AST elevation and UA findings suggestive of possible Rhabdomyolysis after fall  -  for EtOH cessation  - Nutrition evaluation

## 2022-01-20 LAB
ALBUMIN SERPL ELPH-MCNC: 3.5 G/DL — SIGNIFICANT CHANGE UP (ref 3.3–5)
ALP SERPL-CCNC: 93 U/L — SIGNIFICANT CHANGE UP (ref 40–120)
ALT FLD-CCNC: 116 U/L — HIGH (ref 10–45)
ANION GAP SERPL CALC-SCNC: 15 MMOL/L — SIGNIFICANT CHANGE UP (ref 5–17)
APTT BLD: 26.3 SEC — LOW (ref 27.5–35.5)
AST SERPL-CCNC: 228 U/L — HIGH (ref 10–40)
BASOPHILS # BLD AUTO: 0.02 K/UL — SIGNIFICANT CHANGE UP (ref 0–0.2)
BASOPHILS NFR BLD AUTO: 0.4 % — SIGNIFICANT CHANGE UP (ref 0–2)
BILIRUB SERPL-MCNC: 4 MG/DL — HIGH (ref 0.2–1.2)
BUN SERPL-MCNC: 7 MG/DL — SIGNIFICANT CHANGE UP (ref 7–23)
CALCIUM SERPL-MCNC: 8.1 MG/DL — LOW (ref 8.4–10.5)
CHLORIDE SERPL-SCNC: 93 MMOL/L — LOW (ref 96–108)
CK SERPL-CCNC: 219 U/L — HIGH (ref 25–170)
CO2 SERPL-SCNC: 27 MMOL/L — SIGNIFICANT CHANGE UP (ref 22–31)
CREAT SERPL-MCNC: 0.53 MG/DL — SIGNIFICANT CHANGE UP (ref 0.5–1.3)
EOSINOPHIL # BLD AUTO: 0.05 K/UL — SIGNIFICANT CHANGE UP (ref 0–0.5)
EOSINOPHIL NFR BLD AUTO: 1.1 % — SIGNIFICANT CHANGE UP (ref 0–6)
GLUCOSE SERPL-MCNC: 108 MG/DL — HIGH (ref 70–99)
HCT VFR BLD CALC: 25 % — LOW (ref 34.5–45)
HGB BLD-MCNC: 8.2 G/DL — LOW (ref 11.5–15.5)
IMM GRANULOCYTES NFR BLD AUTO: 1.1 % — SIGNIFICANT CHANGE UP (ref 0–1.5)
INR BLD: 1.21 — HIGH (ref 0.88–1.16)
LYMPHOCYTES # BLD AUTO: 1.21 K/UL — SIGNIFICANT CHANGE UP (ref 1–3.3)
LYMPHOCYTES # BLD AUTO: 25.5 % — SIGNIFICANT CHANGE UP (ref 13–44)
MAGNESIUM SERPL-MCNC: 2 MG/DL — SIGNIFICANT CHANGE UP (ref 1.6–2.6)
MCHC RBC-ENTMCNC: 32.5 PG — SIGNIFICANT CHANGE UP (ref 27–34)
MCHC RBC-ENTMCNC: 32.8 GM/DL — SIGNIFICANT CHANGE UP (ref 32–36)
MCV RBC AUTO: 99.2 FL — SIGNIFICANT CHANGE UP (ref 80–100)
MELD SCORE WITH DIALYSIS: 28 POINTS — SIGNIFICANT CHANGE UP
MELD SCORE WITHOUT DIALYSIS: 16 POINTS — SIGNIFICANT CHANGE UP
MONOCYTES # BLD AUTO: 0.26 K/UL — SIGNIFICANT CHANGE UP (ref 0–0.9)
MONOCYTES NFR BLD AUTO: 5.5 % — SIGNIFICANT CHANGE UP (ref 2–14)
NEUTROPHILS # BLD AUTO: 3.15 K/UL — SIGNIFICANT CHANGE UP (ref 1.8–7.4)
NEUTROPHILS NFR BLD AUTO: 66.4 % — SIGNIFICANT CHANGE UP (ref 43–77)
NRBC # BLD: 0 /100 WBCS — SIGNIFICANT CHANGE UP (ref 0–0)
PHOSPHATE SERPL-MCNC: 1.1 MG/DL — LOW (ref 2.5–4.5)
PHOSPHATE SERPL-MCNC: 1.7 MG/DL — LOW (ref 2.5–4.5)
PLATELET # BLD AUTO: 66 K/UL — LOW (ref 150–400)
POTASSIUM SERPL-MCNC: 3.3 MMOL/L — LOW (ref 3.5–5.3)
POTASSIUM SERPL-SCNC: 3.3 MMOL/L — LOW (ref 3.5–5.3)
PROT SERPL-MCNC: 5.6 G/DL — LOW (ref 6–8.3)
PROTHROM AB SERPL-ACNC: 14.4 SEC — HIGH (ref 10.6–13.6)
RBC # BLD: 2.52 M/UL — LOW (ref 3.8–5.2)
RBC # FLD: 15.6 % — HIGH (ref 10.3–14.5)
SODIUM SERPL-SCNC: 135 MMOL/L — SIGNIFICANT CHANGE UP (ref 135–145)
WBC # BLD: 4.74 K/UL — SIGNIFICANT CHANGE UP (ref 3.8–10.5)
WBC # FLD AUTO: 4.74 K/UL — SIGNIFICANT CHANGE UP (ref 3.8–10.5)

## 2022-01-20 PROCEDURE — 99232 SBSQ HOSP IP/OBS MODERATE 35: CPT

## 2022-01-20 PROCEDURE — 76705 ECHO EXAM OF ABDOMEN: CPT | Mod: 26

## 2022-01-20 PROCEDURE — 99233 SBSQ HOSP IP/OBS HIGH 50: CPT | Mod: GC

## 2022-01-20 RX ORDER — SODIUM,POTASSIUM PHOSPHATES 278-250MG
1 POWDER IN PACKET (EA) ORAL
Refills: 0 | Status: COMPLETED | OUTPATIENT
Start: 2022-01-20 | End: 2022-01-20

## 2022-01-20 RX ORDER — POTASSIUM PHOSPHATE, MONOBASIC POTASSIUM PHOSPHATE, DIBASIC 236; 224 MG/ML; MG/ML
30 INJECTION, SOLUTION INTRAVENOUS ONCE
Refills: 0 | Status: COMPLETED | OUTPATIENT
Start: 2022-01-20 | End: 2022-01-20

## 2022-01-20 RX ORDER — SODIUM,POTASSIUM PHOSPHATES 278-250MG
1 POWDER IN PACKET (EA) ORAL
Refills: 0 | Status: DISCONTINUED | OUTPATIENT
Start: 2022-01-20 | End: 2022-01-20

## 2022-01-20 RX ADMIN — Medication 1 GRAM(S): at 12:08

## 2022-01-20 RX ADMIN — Medication 1 PACKET(S): at 12:09

## 2022-01-20 RX ADMIN — Medication 1 PACKET(S): at 07:26

## 2022-01-20 RX ADMIN — Medication 1 MILLIGRAM(S): at 12:08

## 2022-01-20 RX ADMIN — Medication 1 GRAM(S): at 06:20

## 2022-01-20 RX ADMIN — Medication 650 MILLIGRAM(S): at 02:08

## 2022-01-20 RX ADMIN — Medication 2 MILLIGRAM(S): at 18:13

## 2022-01-20 RX ADMIN — Medication 2 MILLIGRAM(S): at 15:12

## 2022-01-20 RX ADMIN — Medication 105 MILLIGRAM(S): at 21:30

## 2022-01-20 RX ADMIN — Medication 650 MILLIGRAM(S): at 01:08

## 2022-01-20 RX ADMIN — POTASSIUM PHOSPHATE, MONOBASIC POTASSIUM PHOSPHATE, DIBASIC 83.33 MILLIMOLE(S): 236; 224 INJECTION, SOLUTION INTRAVENOUS at 07:49

## 2022-01-20 RX ADMIN — Medication 1 TABLET(S): at 12:08

## 2022-01-20 RX ADMIN — PANTOPRAZOLE SODIUM 40 MILLIGRAM(S): 20 TABLET, DELAYED RELEASE ORAL at 06:20

## 2022-01-20 RX ADMIN — Medication 105 MILLIGRAM(S): at 06:19

## 2022-01-20 RX ADMIN — Medication 1 GRAM(S): at 01:26

## 2022-01-20 NOTE — PROGRESS NOTE ADULT - PROBLEM SELECTOR PLAN 1
Few day history of nausea and coffee ground emesis with limited PO intake. Macrocytic anemia.   - On Octreotide & Protonix ggt  - EGD 1/19 x2d hx nausea and coffee ground emesis with limited PO intake. Macrocytic anemia. s/p Octreotide & Protonix ggt  - EGD 1/19: erosive esophagitis  - GI PPx: PO Protonix 40mg Qd, Carafate 1g q6h  - Nausea PRN: Tigan 200mg q6h

## 2022-01-20 NOTE — PHYSICAL THERAPY INITIAL EVALUATION ADULT - GENERAL OBSERVATIONS, REHAB EVAL
PT IE Completed. Patient received slid down in bed with legs hanging off the R side, +heplock IV, +tito, NAD, willing to work with PT.

## 2022-01-20 NOTE — PHYSICAL THERAPY INITIAL EVALUATION ADULT - PERTINENT HX OF CURRENT PROBLEM, REHAB EVAL
47F PMHx of chronic alcoholism (no hx ICU adm or seizures), anxiety, depression presenting alcohol w/d with N/V of coffee ground emesis.

## 2022-01-20 NOTE — PHYSICAL THERAPY INITIAL EVALUATION ADULT - IMPAIRMENTS FOUND, PT EVAL
aerobic capacity/endurance/cognitive impairment/gait, locomotion, and balance/muscle strength/poor safety awareness/posture

## 2022-01-20 NOTE — PROGRESS NOTE ADULT - PROBLEM SELECTOR PLAN 7
Acute GI blood loss anemia coupled with nutritional deficiencies. Presented with Hgb of 11.5 but has now decreased to 6.8, likely reflective of hydration given for hypothermia and hypotension. Repeat without transfusion of 9.2, more consistent with patient's baseline, though still lower than previous. Etiology presumably MW tear vs erosive esophagitis or gastritis, less likely varices as no clear evidence of cirrhosis, but may be masked by element of alc hep. Prior and current imaging reviewed. No ascites, but hepatomegaly with normal spleen size. B12 elevate, low folate.  - Thiamine, Folate, MVT supplementation  - Given concern for portal hypertension in the setting of repeat episodes of alcoholic hepatitis, would treat empirically for varices  - PPI infusion and Octreotide infusion  - Maintain active T&S, large bore IV access  - Transfusion threshold per primary team  - Clear liquid diet tonight with NPO for tentative EGD tomorrow morning  - CIWA 0 as of now, frequent CIWA checks, though last drink reportedly earlier today (EtOH <3?) Acute GI blood loss anemia coupled with nutritional deficiencies. Presented with Hgb of 11.5 but has now decreased to 6.8, likely reflective of hydration given for hypothermia and hypotension. Repeat without transfusion of 9.2, more consistent with patient's baseline, though still lower than previous. Etiology presumably MW tear vs erosive esophagitis or gastritis, less likely varices as no clear evidence of cirrhosis, but may be masked by element of alc hep. B12 elevated, low folate.  - Given concern for portal hypertension in the setting of repeat episodes of alcoholic hepatitis, would treat empirically for varices  - Maintain active T&S, large bore IV access  - Clear liquid diet tonight with NPO for tentative EGD tomorrow morning Acute GI blood loss anemia + nutritional deficiencies. EGD: Erosive esophagitis. B12 elevated, low folate.  - Maintain active T&S, large bore IV access, transfuse hgb<7

## 2022-01-20 NOTE — PROGRESS NOTE ADULT - PROBLEM SELECTOR PLAN 2
Chronic use of 0.5-1L of vodka daily. Denies a history of alcohol withdrawal seizures or ICU stays.   - PRN Ativan for CIWA >8 Chronic use of 0.5-1L of vodka daily. Denies a history of alcohol withdrawal seizures or ICU stays.   - Thiamine, Folate, MVT supplementation  - PRN Ativan for CIWA >8

## 2022-01-20 NOTE — PROGRESS NOTE ADULT - SUBJECTIVE AND OBJECTIVE BOX
GASTROENTEROLOGY PROGRESS NOTE  Patient seen and examined at bedside. Patient reports feeling better today. No melena, hematochezia reported. No chest or throat pain, dysphagia or odynophagia.     PERTINENT REVIEW OF SYSTEMS:  CONSTITUTIONAL: No weakness, fevers or chills  HEENT: No visual changes; No vertigo or throat pain   GASTROINTESTINAL: No abdominal or epigastric pain. No nausea, vomiting, or hematemesis; No diarrhea or constipation. No melena or hematochezia.  NEUROLOGICAL: No numbness or weakness  SKIN: No itching, burning, rashes, or lesions     Allergies    No Known Allergies    Intolerances      MEDICATIONS:  MEDICATIONS  (STANDING):  folic acid 1 milliGRAM(s) Oral daily  influenza   Vaccine 0.5 milliLiter(s) IntraMuscular once  magnesium sulfate  IVPB 2 Gram(s) IV Intermittent once  multivitamin/minerals 1 Tablet(s) Oral daily  pantoprazole    Tablet 40 milliGRAM(s) Oral two times a day  potassium phosphate / sodium phosphate Powder (PHOS-NaK) 1 Packet(s) Oral three times a day with meals  sucralfate 1 Gram(s) Oral every 6 hours  thiamine IVPB 500 milliGRAM(s) IV Intermittent every 8 hours    MEDICATIONS  (PRN):  acetaminophen     Tablet .. 650 milliGRAM(s) Oral every 6 hours PRN Temp greater or equal to 38C (100.4F), Mild Pain (1 - 3)  benzonatate 100 milliGRAM(s) Oral every 8 hours PRN Cough  LORazepam   Injectable 2 milliGRAM(s) IV Push every 2 hours PRN CIWA-Ar score 8 or greater  trimethobenzamide Injectable 200 milliGRAM(s) IntraMuscular every 6 hours PRN Nausea and/or Vomiting    Vital Signs Last 24 Hrs  T(C): 36.7 (20 Jan 2022 09:00), Max: 37.8 (20 Jan 2022 05:39)  T(F): 98.1 (20 Jan 2022 09:00), Max: 100 (20 Jan 2022 05:39)  HR: 96 (20 Jan 2022 08:40) (82 - 102)  BP: 114/73 (20 Jan 2022 08:40) (86/62 - 124/80)  BP(mean): 88 (20 Jan 2022 08:40) (66 - 98)  RR: 18 (20 Jan 2022 08:40) (16 - 18)  SpO2: 96% (20 Jan 2022 08:40) (94% - 97%)    01-19 @ 07:01  -  01-20 @ 07:00  --------------------------------------------------------  IN: 1100 mL / OUT: 2025 mL / NET: -925 mL      PHYSICAL EXAM:    General: NAD  HEENT: MMM, conjunctiva and sclera clear  Gastrointestinal: Soft non-tender non-distended; Normal bowel sounds; No hepatosplenomegaly. No rebound or guarding  Skin: Warm and dry. No obvious rash    LABS:                        8.2    4.74  )-----------( 66       ( 20 Jan 2022 06:36 )             25.0     01-20    135  |  93<L>  |  7   ----------------------------<  108<H>  3.3<L>   |  27  |  0.53    Ca    8.1<L>      20 Jan 2022 06:36  Phos  1.1     01-20  Mg     2.0     01-20    TPro  5.6<L>  /  Alb  3.5  /  TBili  4.0<H>  /  DBili  x   /  AST  228<H>  /  ALT  116<H>  /  AlkPhos  93  01-20    PT/INR - ( 20 Jan 2022 06:36 )   PT: 14.4 sec;   INR: 1.21          PTT - ( 20 Jan 2022 06:36 )  PTT:26.3 sec                  Culture - Urine (collected 18 Jan 2022 19:00)  Source: Clean Catch Clean Catch (Midstream)  Final Report (19 Jan 2022 15:20):    No growth    Culture - Blood (collected 18 Jan 2022 10:30)  Source: .Blood Blood-Peripheral  Preliminary Report (19 Jan 2022 11:01):    No growth to date.    Culture - Blood (collected 18 Jan 2022 10:30)  Source: .Blood Blood-Peripheral  Preliminary Report (19 Jan 2022 11:01):    No growth to date.      RADIOLOGY & ADDITIONAL STUDIES:  Reviewed

## 2022-01-20 NOTE — PROGRESS NOTE ADULT - PROBLEM SELECTOR PLAN 4
In the setting of active alcohol use. Underlying steatosis vs cirrhosis, but no clear evidence of decompensation (no ascites, no known history of variceal hemorrhage, no documented hepatic encephalopathy, no splenomegaly on CT). Current elevation with AST>ALT, elevated bili, and coagulopathy. CT: Hepatic steatosis. Utox negative.  - Maddrey Discriminant Function score of 15 on admission suggestive of good prognosis  - INR and CMP daily  - Trend CK given AST elevation and UA findings suggestive of possible Rhabdomyolysis after fall  -  for EtOH cessation  - Nutrition evaluation I/s/o active alcohol use. CT & US marked hepatomegaly and steatosis. Current elevation with AST>ALT, elevated bili, and coagulopathy. Utox negative.  - Maddrey Discriminant Function 15 on admission, good prognosis  - Trend CK(rhabdo 2/2 fall), INR, CMP daily

## 2022-01-20 NOTE — PROVIDER CONTACT NOTE (CHANGE IN STATUS NOTIFICATION) - SITUATION
Pt has been difficult throughout shift and refusing medications, treatment and tele. Pt also repeatedly trying to get OOB.

## 2022-01-20 NOTE — PROGRESS NOTE ADULT - ASSESSMENT
Patient is a 47 year old female with a Hx of EtOH abuse (0.5L/day whiskey) presenting with 3 days of persistent vomiting with intermittent "coffee ground" emesis. GI consulted for concern for GI hemorrhage.    Macrocytic anemia - Likely component of acute GI blood loss anemia coupled with nutritional deficiencies. Has had similar episodes in the past though cannot recall last upper endoscopy. Presented with Hgb of 11.5 but has now decreased to 6.8, likely reflective of hydration given for hypothermia and hypotension. Repeat without transfusion of 9.2, more consistent with patient's baseline, though still lower than previous. Etiology presumably MW tear vs erosive esophagitis or gastritis, less likely varices as no clear evidence of cirrhosis, but may be masked by element of alc hep. Prior and current imaging reviewed. No ascites, but hepatomegaly with normal spleen size. Patient s/p EGD on 1/19 with Grade D esophagitis without active bleeding, gastritis.  - Thiamine, Folate, MVT supplementation  - Continue PPI 40mg PO BID and Carafate suspension 1g Q6H for 8 weeks  - Patient should follow-up for repeat endoscopy in 8 weeks to assess for mucosal healing  - Maintain active T&S, large bore IV access  - Transfusion threshold per primary team  - Advance diet as tolerated    Elevated LTs - In the setting of active alcohol use. Question of underlying steatosis vs cirrhosis, but no clear evidence of decompensation (no ascites, no known history of variceal hemorrhage, no documented hepatic encephalopathy, no splenomegaly on CT). Current elevation with AST>ALT, elevated bili, and coagulopathy. With hepatomegaly on CT constellation of findings suggestive of alcoholic hepatitis.   - Maddrey Discriminant Function score of 15 on admission suggestive of good prognosis, improving  - INR and CMP daily  - SW for EtOH cessation  - Nutrition evaluation  - CIWA protocol per primary medical team    Recommendations discussed with primary team  Case discussed with attending physician  Please recall as needed with any gastroenterological questions  Please schedule patient for follow-up at [facility] on the fourth floor of [address] with Dr. Villasenor at [phone] Patient is a 47 year old female with a Hx of EtOH abuse (0.5L/day whiskey) presenting with 3 days of persistent vomiting with intermittent "coffee ground" emesis. GI consulted for concern for GI hemorrhage.    Macrocytic anemia - Likely component of acute GI blood loss anemia coupled with nutritional deficiencies. Has had similar episodes in the past though cannot recall last upper endoscopy. Presented with Hgb of 11.5 but has now decreased to 6.8, likely reflective of hydration given for hypothermia and hypotension. Repeat without transfusion of 9.2, more consistent with patient's baseline, though still lower than previous. Etiology presumably MW tear vs erosive esophagitis or gastritis, less likely varices as no clear evidence of cirrhosis, but may be masked by element of alc hep. Prior and current imaging reviewed. No ascites, but hepatomegaly with normal spleen size. Patient s/p EGD on 1/19 with Grade D esophagitis without active bleeding, gastritis.  - Thiamine, Folate, MVT supplementation  - Continue PPI 40mg PO BID and Carafate suspension 1g Q6H for 8 weeks  - Patient should follow-up for repeat endoscopy in 8 weeks to assess for mucosal healing  - Maintain active T&S, large bore IV access  - Transfusion threshold per primary team  - Advance diet as tolerated    Elevated LTs - In the setting of active alcohol use. Question of underlying steatosis vs cirrhosis, but no clear evidence of decompensation (no ascites, no known history of variceal hemorrhage, no documented hepatic encephalopathy, no splenomegaly on CT). Current elevation with AST>ALT, elevated bili, and coagulopathy. With hepatomegaly on CT constellation of findings suggestive of alcoholic hepatitis.   - Maddrey Discriminant Function score of 15 on admission suggestive of good prognosis, improving  - INR and CMP daily  - SW for EtOH cessation  - Nutrition evaluation  - CIWA protocol per primary medical team  - Counseled on alcohol cessation and likelihood of progression to chronic liver disease or death if she does not stop  - Given hypophosphatemia, would monitor closely for development of refeeding syndrome    Recommendations discussed with primary team  Case discussed with attending physician  Please recall as needed with any gastroenterological questions  Please schedule patient for follow-up at the [facility] on the fourth floor of [address] with Dr. Villasenor at [phone]

## 2022-01-20 NOTE — PROGRESS NOTE ADULT - PROBLEM SELECTOR PLAN 3
On admission, lipase 976 and diffuse abdominal pain, CT findings negative. Lactate 7.3 > 1.9, no signs of end organ failure, patient not tolerated PO intake with nausea and vomiting  - f/u BClx and UClx On admission, lipase 976 and diffuse abdominal pain, CT findings negative. Lactate 7.3 > 1.9, no signs of end organ failure, patient not tolerated PO intake with nausea and vomiting  - BClx & UClx NGTD

## 2022-01-20 NOTE — PROGRESS NOTE ADULT - SUBJECTIVE AND OBJECTIVE BOX
**INCOMPLETE NOTE    OVERNIGHT EVENTS:    SUBJECTIVE:  Patient seen and examined at bedside.    Vital Signs Last 12 Hrs  T(F): 100 (22 @ 05:39), Max: 100 (22 @ 05:39)  HR: 82 (22 @ 04:12) (82 - 98)  BP: 114/55 (22 @ 04:12) (86/62 - 116/69)  BP(mean): 77 (22 @ 04:12) (66 - 86)  RR: 16 (22 @ 04:12) (16 - 18)  SpO2: 96% (22 @ 04:12) (94% - 97%)  I&O's Summary    2022 07:01  -  2022 07:00  --------------------------------------------------------  IN: 1100 mL / OUT: 1675 mL / NET: -575 mL        PHYSICAL EXAM:  Constitutional: NAD, comfortable in bed.  HEENT: NC/AT, PERRLA, EOMI, no conjunctival pallor or scleral icterus, MMM  Neck: Supple, no JVD  Respiratory: CTA B/L. No w/r/r.   Cardiovascular: RRR, normal S1 and S2, no m/r/g.   Gastrointestinal: +BS, soft NTND, no guarding or rebound tenderness, no palpable masses   Extremities: wwp; no cyanosis, clubbing or edema.   Vascular: Pulses equal and strong throughout.   Neurological: AAOx3, no CN deficits, strength and sensation intact throughout.   Skin: No gross skin abnormalities or rashes        LABS:                        8.2    4.74  )-----------( 66       ( 2022 06:36 )             25.0         135  |  93<L>  |  7   ----------------------------<  108<H>  3.3<L>   |  27  |  0.53    Ca    8.1<L>      2022 06:36  Phos  1.1       Mg     2.0         TPro  5.6<L>  /  Alb  3.5  /  TBili  4.0<H>  /  DBili  x   /  AST  228<H>  /  ALT  116<H>  /  AlkPhos  93      PT/INR - ( 2022 06:36 )   PT: 14.4 sec;   INR: 1.21          PTT - ( 2022 06:36 )  PTT:26.3 sec  Urinalysis Basic - ( 2022 08:41 )    Color: Yellow / Appearance: Clear / S.020 / pH: x  Gluc: x / Ketone: >=80 mg/dL  / Bili: Small / Urobili: 0.2 E.U./dL   Blood: x / Protein: 100 mg/dL / Nitrite: NEGATIVE   Leuk Esterase: NEGATIVE / RBC: < 5 /HPF / WBC 5-10 /HPF   Sq Epi: x / Non Sq Epi: 5-10 /HPF / Bacteria: Present /HPF          RADIOLOGY & ADDITIONAL TESTS:    MEDICATIONS  (STANDING):  folic acid 1 milliGRAM(s) Oral daily  influenza   Vaccine 0.5 milliLiter(s) IntraMuscular once  magnesium sulfate  IVPB 2 Gram(s) IV Intermittent once  multivitamin/minerals 1 Tablet(s) Oral daily  pantoprazole    Tablet 40 milliGRAM(s) Oral two times a day  potassium phosphate / sodium phosphate Powder (PHOS-NaK) 1 Packet(s) Oral three times a day with meals  potassium phosphate IVPB 30 milliMole(s) IV Intermittent once  sucralfate 1 Gram(s) Oral every 6 hours  thiamine IVPB 500 milliGRAM(s) IV Intermittent every 8 hours    MEDICATIONS  (PRN):  acetaminophen     Tablet .. 650 milliGRAM(s) Oral every 6 hours PRN Temp greater or equal to 38C (100.4F), Mild Pain (1 - 3)  benzonatate 100 milliGRAM(s) Oral every 8 hours PRN Cough  LORazepam   Injectable 2 milliGRAM(s) IV Push every 2 hours PRN CIWA-Ar score 8 or greater  trimethobenzamide Injectable 200 milliGRAM(s) IntraMuscular every 6 hours PRN Nausea and/or Vomiting   ***Transfer from [facility] to [facility]***  Hospital Course:  47F PMHx of chronic alcoholism (0.5-1L whiskey/day, w/o alcohol w/d seizures or ICU stays), anxiety, depression presenting with nausea and vomiting (some coffee brown emesis) every 2 hours from - with decreased PO intake, associated with intermittent lower abdominal pain. On admission found to be persistently hypokalemic requiring aggressive repletion. Placed on IV Protonix and Octreotide. EGD performed:       OVERNIGHT EVENTS:    SUBJECTIVE:  Patient seen and examined at bedside.    Vital Signs Last 12 Hrs  T(F): 100 (22 @ 05:39), Max: 100 (22 @ 05:39)  HR: 82 (22 @ 04:12) (82 - 98)  BP: 114/55 (22 @ 04:12) (86/62 - 116/69)  BP(mean): 77 (22 @ 04:12) (66 - 86)  RR: 16 (22 @ 04:12) (16 - 18)  SpO2: 96% (22 @ 04:12) (94% - 97%)  I&O's Summary    2022 07:01  -  2022 07:00  --------------------------------------------------------  IN: 1100 mL / OUT: 1675 mL / NET: -575 mL        PHYSICAL EXAM:  Constitutional: NAD, comfortable in bed.  HEENT: NC/AT, PERRLA, EOMI, no conjunctival pallor or scleral icterus, MMM  Neck: Supple, no JVD  Respiratory: CTA B/L. No w/r/r.   Cardiovascular: RRR, normal S1 and S2, no m/r/g.   Gastrointestinal: +BS, soft NTND, no guarding or rebound tenderness, no palpable masses   Extremities: wwp; no cyanosis, clubbing or edema.   Vascular: Pulses equal and strong throughout.   Neurological: AAOx3, no CN deficits, strength and sensation intact throughout.   Skin: No gross skin abnormalities or rashes        LABS:                        8.2    4.74  )-----------( 66       ( 2022 06:36 )             25.0     01-    135  |  93<L>  |  7   ----------------------------<  108<H>  3.3<L>   |  27  |  0.53    Ca    8.1<L>      2022 06:36  Phos  1.1       Mg     2.0         TPro  5.6<L>  /  Alb  3.5  /  TBili  4.0<H>  /  DBili  x   /  AST  228<H>  /  ALT  116<H>  /  AlkPhos  93      PT/INR - ( 2022 06:36 )   PT: 14.4 sec;   INR: 1.21          PTT - ( 2022 06:36 )  PTT:26.3 sec  Urinalysis Basic - ( 2022 08:41 )    Color: Yellow / Appearance: Clear / S.020 / pH: x  Gluc: x / Ketone: >=80 mg/dL  / Bili: Small / Urobili: 0.2 E.U./dL   Blood: x / Protein: 100 mg/dL / Nitrite: NEGATIVE   Leuk Esterase: NEGATIVE / RBC: < 5 /HPF / WBC 5-10 /HPF   Sq Epi: x / Non Sq Epi: 5-10 /HPF / Bacteria: Present /HPF          RADIOLOGY & ADDITIONAL TESTS:    MEDICATIONS  (STANDING):  folic acid 1 milliGRAM(s) Oral daily  influenza   Vaccine 0.5 milliLiter(s) IntraMuscular once  magnesium sulfate  IVPB 2 Gram(s) IV Intermittent once  multivitamin/minerals 1 Tablet(s) Oral daily  pantoprazole    Tablet 40 milliGRAM(s) Oral two times a day  potassium phosphate / sodium phosphate Powder (PHOS-NaK) 1 Packet(s) Oral three times a day with meals  potassium phosphate IVPB 30 milliMole(s) IV Intermittent once  sucralfate 1 Gram(s) Oral every 6 hours  thiamine IVPB 500 milliGRAM(s) IV Intermittent every 8 hours    MEDICATIONS  (PRN):  acetaminophen     Tablet .. 650 milliGRAM(s) Oral every 6 hours PRN Temp greater or equal to 38C (100.4F), Mild Pain (1 - 3)  benzonatate 100 milliGRAM(s) Oral every 8 hours PRN Cough  LORazepam   Injectable 2 milliGRAM(s) IV Push every 2 hours PRN CIWA-Ar score 8 or greater  trimethobenzamide Injectable 200 milliGRAM(s) IntraMuscular every 6 hours PRN Nausea and/or Vomiting     ***Transfer from [facility] to [facility]***  Hospital Course:  47F PMHx of chronic alcoholism (0.5-1L whiskey/day, w/o alcohol w/d seizures or ICU stays), anxiety, depression presenting with nausea and vomiting (some coffee brown emesis) every 2 hours from - with decreased PO intake, associated with intermittent lower abdominal pain. On admission found to be persistently hypokalemic requiring aggressive repletion. Placed on IV Protonix and Octreotide. EGD performed       OVERNIGHT EVENTS:    SUBJECTIVE:  Patient seen and examined at bedside.    Vital Signs Last 12 Hrs  T(F): 100 (22 @ 05:39), Max: 100 (22 @ 05:39)  HR: 82 (22 @ 04:12) (82 - 98)  BP: 114/55 (22 @ 04:12) (86/62 - 116/69)  BP(mean): 77 (22 @ 04:12) (66 - 86)  RR: 16 (22 @ 04:12) (16 - 18)  SpO2: 96% (22 @ 04:12) (94% - 97%)  I&O's Summary    2022 07:01  -  2022 07:00  --------------------------------------------------------  IN: 1100 mL / OUT: 1675 mL / NET: -575 mL        PHYSICAL EXAM:  Constitutional: NAD, comfortable in bed.  HEENT: NC/AT, PERRLA, EOMI, no conjunctival pallor or scleral icterus, MMM  Neck: Supple, no JVD  Respiratory: CTA B/L. No w/r/r.   Cardiovascular: RRR, normal S1 and S2, no m/r/g.   Gastrointestinal: +BS, soft NTND, no guarding or rebound tenderness, no palpable masses   Extremities: wwp; no cyanosis, clubbing or edema.   Vascular: Pulses equal and strong throughout.   Neurological: AAOx3, no CN deficits, strength and sensation intact throughout.   Skin: No gross skin abnormalities or rashes        LABS:                        8.2    4.74  )-----------( 66       ( 2022 06:36 )             25.0     -    135  |  93<L>  |  7   ----------------------------<  108<H>  3.3<L>   |  27  |  0.53    Ca    8.1<L>      2022 06:36  Phos  1.1       Mg     2.0         TPro  5.6<L>  /  Alb  3.5  /  TBili  4.0<H>  /  DBili  x   /  AST  228<H>  /  ALT  116<H>  /  AlkPhos  93      PT/INR - ( 2022 06:36 )   PT: 14.4 sec;   INR: 1.21          PTT - ( 2022 06:36 )  PTT:26.3 sec  Urinalysis Basic - ( 2022 08:41 )    Color: Yellow / Appearance: Clear / S.020 / pH: x  Gluc: x / Ketone: >=80 mg/dL  / Bili: Small / Urobili: 0.2 E.U./dL   Blood: x / Protein: 100 mg/dL / Nitrite: NEGATIVE   Leuk Esterase: NEGATIVE / RBC: < 5 /HPF / WBC 5-10 /HPF   Sq Epi: x / Non Sq Epi: 5-10 /HPF / Bacteria: Present /HPF          RADIOLOGY & ADDITIONAL TESTS:    MEDICATIONS  (STANDING):  folic acid 1 milliGRAM(s) Oral daily  influenza   Vaccine 0.5 milliLiter(s) IntraMuscular once  magnesium sulfate  IVPB 2 Gram(s) IV Intermittent once  multivitamin/minerals 1 Tablet(s) Oral daily  pantoprazole    Tablet 40 milliGRAM(s) Oral two times a day  potassium phosphate / sodium phosphate Powder (PHOS-NaK) 1 Packet(s) Oral three times a day with meals  potassium phosphate IVPB 30 milliMole(s) IV Intermittent once  sucralfate 1 Gram(s) Oral every 6 hours  thiamine IVPB 500 milliGRAM(s) IV Intermittent every 8 hours    MEDICATIONS  (PRN):  acetaminophen     Tablet .. 650 milliGRAM(s) Oral every 6 hours PRN Temp greater or equal to 38C (100.4F), Mild Pain (1 - 3)  benzonatate 100 milliGRAM(s) Oral every 8 hours PRN Cough  LORazepam   Injectable 2 milliGRAM(s) IV Push every 2 hours PRN CIWA-Ar score 8 or greater  trimethobenzamide Injectable 200 milliGRAM(s) IntraMuscular every 6 hours PRN Nausea and/or Vomiting     ***Transfer from [facility] to [facility]***  Hospital Course:  47F PMHx of chronic alcoholism (0.5-1L whiskey/day, w/o alcohol w/d seizures or ICU stays), anxiety, depression presenting with nausea and vomiting (some coffee brown emesis) every 2 hours from - with decreased PO intake, associated with intermittent lower abdominal pain. On admission and during [facility] stay found to be persistently hypokalemic requiring aggressive repletion. Placed on IV Protonix and Octreotide. EGD performed  showed erosive esophagitis. GI ppx switched to PO protonix & carafate.      OVERNIGHT EVENTS:    SUBJECTIVE:  Patient seen and examined at bedside.    Vital Signs Last 12 Hrs  T(F): 100 (22 @ 05:39), Max: 100 (22 @ 05:39)  HR: 82 (22 @ 04:12) (82 - 98)  BP: 114/55 (22 @ 04:12) (86/62 - 116/69)  BP(mean): 77 (22 @ 04:12) (66 - 86)  RR: 16 (22 @ 04:12) (16 - 18)  SpO2: 96% (22 @ 04:12) (94% - 97%)  I&O's Summary    2022 07:01  -  2022 07:00  --------------------------------------------------------  IN: 1100 mL / OUT: 1675 mL / NET: -575 mL        PHYSICAL EXAM:  Constitutional: NAD, comfortable in bed.  HEENT: NC/AT, PERRLA, EOMI, no conjunctival pallor or scleral icterus, MMM  Neck: Supple, no JVD  Respiratory: CTA B/L. No w/r/r.   Cardiovascular: RRR, normal S1 and S2, no m/r/g.   Gastrointestinal: +BS, soft NTND, no guarding or rebound tenderness, no palpable masses   Extremities: wwp; no cyanosis, clubbing or edema.   Vascular: Pulses equal and strong throughout.   Neurological: AAOx3, no CN deficits, strength and sensation intact throughout.   Skin: No gross skin abnormalities or rashes        LABS:                        8.2    4.74  )-----------( 66       ( 2022 06:36 )             25.0     01-20    135  |  93<L>  |  7   ----------------------------<  108<H>  3.3<L>   |  27  |  0.53    Ca    8.1<L>      2022 06:36  Phos  1.1     -  Mg     2.0     -    TPro  5.6<L>  /  Alb  3.5  /  TBili  4.0<H>  /  DBili  x   /  AST  228<H>  /  ALT  116<H>  /  AlkPhos  93  01-20    PT/INR - ( 2022 06:36 )   PT: 14.4 sec;   INR: 1.21          PTT - ( 2022 06:36 )  PTT:26.3 sec  Urinalysis Basic - ( 2022 08:41 )    Color: Yellow / Appearance: Clear / S.020 / pH: x  Gluc: x / Ketone: >=80 mg/dL  / Bili: Small / Urobili: 0.2 E.U./dL   Blood: x / Protein: 100 mg/dL / Nitrite: NEGATIVE   Leuk Esterase: NEGATIVE / RBC: < 5 /HPF / WBC 5-10 /HPF   Sq Epi: x / Non Sq Epi: 5-10 /HPF / Bacteria: Present /HPF          RADIOLOGY & ADDITIONAL TESTS:    MEDICATIONS  (STANDING):  folic acid 1 milliGRAM(s) Oral daily  influenza   Vaccine 0.5 milliLiter(s) IntraMuscular once  magnesium sulfate  IVPB 2 Gram(s) IV Intermittent once  multivitamin/minerals 1 Tablet(s) Oral daily  pantoprazole    Tablet 40 milliGRAM(s) Oral two times a day  potassium phosphate / sodium phosphate Powder (PHOS-NaK) 1 Packet(s) Oral three times a day with meals  potassium phosphate IVPB 30 milliMole(s) IV Intermittent once  sucralfate 1 Gram(s) Oral every 6 hours  thiamine IVPB 500 milliGRAM(s) IV Intermittent every 8 hours    MEDICATIONS  (PRN):  acetaminophen     Tablet .. 650 milliGRAM(s) Oral every 6 hours PRN Temp greater or equal to 38C (100.4F), Mild Pain (1 - 3)  benzonatate 100 milliGRAM(s) Oral every 8 hours PRN Cough  LORazepam   Injectable 2 milliGRAM(s) IV Push every 2 hours PRN CIWA-Ar score 8 or greater  trimethobenzamide Injectable 200 milliGRAM(s) IntraMuscular every 6 hours PRN Nausea and/or Vomiting     ***Transfer [facility] to [facility]***  Hospital Course:  47F PMHx of chronic alcoholism (0.5-1L whiskey/day, w/o alcohol w/d seizures or ICU stays), anxiety, depression presenting with nausea and vomiting (some coffee brown emesis) every 2 hours from - with decreased PO intake, associated with intermittent lower abdominal pain. On admission and during [facility] stay found to be persistently hypokalemic requiring aggressive repletion. Also had macrocytic anemia, transaminitis, elevated DBili, lipase & CK. Imaging revealed acute comminuted fracture posterior right iliac crest, no surgical intervention. Placed on IV Protonix and Octreotide. EGD performed  showed erosive esophagitis. GI ppx switched to PO protonix & carafate. Abdomen US significant for enlarged liver, measuring 21.1 cm, increased echogenicity. Previously seen liver masses are not visualized on this study.      OVERNIGHT EVENTS:    SUBJECTIVE:  Patient seen and examined at bedside.    Vital Signs Last 12 Hrs  T(F): 100 (22 @ 05:39), Max: 100 (22 @ 05:39)  HR: 82 (22 @ 04:12) (82 - 98)  BP: 114/55 (22 @ 04:12) (86/62 - 116/69)  BP(mean): 77 (22 @ 04:12) (66 - 86)  RR: 16 (22 @ 04:12) (16 - 18)  SpO2: 96% (22 @ 04:12) (94% - 97%)  I&O's Summary    2022 07:01  -  2022 07:00  --------------------------------------------------------  IN: 1100 mL / OUT: 1675 mL / NET: -575 mL    PHYSICAL EXAM:  Constitutional: NAD, comfortable in bed.  HEENT: NC/AT, PERRLA, EOMI, no conjunctival pallor or scleral icterus, MMM  Neck: Supple, no JVD  Respiratory: CTA B/L. No w/r/r.   Cardiovascular: RRR, normal S1 and S2, no m/r/g.   Gastrointestinal: +BS, soft NTND, no guarding or rebound tenderness, no palpable masses   Extremities: wwp; no cyanosis, clubbing or edema.   Vascular: Pulses equal and strong throughout.   Neurological: AAOx3, no CN deficits, strength and sensation intact throughout.   Skin: No gross skin abnormalities or rashes        LABS:                        8.2    4.74  )-----------( 66       ( 2022 06:36 )             25.0     01-20    135  |  93<L>  |  7   ----------------------------<  108<H>  3.3<L>   |  27  |  0.53    Ca    8.1<L>      2022 06:36  Phos  1.1     01-20  Mg     2.0     01-20    TPro  5.6<L>  /  Alb  3.5  /  TBili  4.0<H>  /  DBili  x   /  AST  228<H>  /  ALT  116<H>  /  AlkPhos  93  01-20    PT/INR - ( 2022 06:36 )   PT: 14.4 sec;   INR: 1.21          PTT - ( 2022 06:36 )  PTT:26.3 sec  Urinalysis Basic - ( 2022 08:41 )    Color: Yellow / Appearance: Clear / S.020 / pH: x  Gluc: x / Ketone: >=80 mg/dL  / Bili: Small / Urobili: 0.2 E.U./dL   Blood: x / Protein: 100 mg/dL / Nitrite: NEGATIVE   Leuk Esterase: NEGATIVE / RBC: < 5 /HPF / WBC 5-10 /HPF   Sq Epi: x / Non Sq Epi: 5-10 /HPF / Bacteria: Present /HPF          RADIOLOGY & ADDITIONAL TESTS:    MEDICATIONS  (STANDING):  folic acid 1 milliGRAM(s) Oral daily  influenza   Vaccine 0.5 milliLiter(s) IntraMuscular once  magnesium sulfate  IVPB 2 Gram(s) IV Intermittent once  multivitamin/minerals 1 Tablet(s) Oral daily  pantoprazole    Tablet 40 milliGRAM(s) Oral two times a day  potassium phosphate / sodium phosphate Powder (PHOS-NaK) 1 Packet(s) Oral three times a day with meals  potassium phosphate IVPB 30 milliMole(s) IV Intermittent once  sucralfate 1 Gram(s) Oral every 6 hours  thiamine IVPB 500 milliGRAM(s) IV Intermittent every 8 hours    MEDICATIONS  (PRN):  acetaminophen     Tablet .. 650 milliGRAM(s) Oral every 6 hours PRN Temp greater or equal to 38C (100.4F), Mild Pain (1 - 3)  benzonatate 100 milliGRAM(s) Oral every 8 hours PRN Cough  LORazepam   Injectable 2 milliGRAM(s) IV Push every 2 hours PRN CIWA-Ar score 8 or greater  trimethobenzamide Injectable 200 milliGRAM(s) IntraMuscular every 6 hours PRN Nausea and/or Vomiting     ***Transfer from [facility] to [facility]***  Hospital Course:  47F PMHx of chronic alcoholism (0.5-1L whiskey/day, w/o alcohol w/d seizures or ICU stays), anxiety, depression presenting with nausea and vomiting (some coffee brown emesis) every 2 hours from - with decreased PO intake, associated with intermittent lower abdominal pain. On admission and during [facility] stay found to be persistently hypokalemic requiring aggressive repletion. Also had macrocytic anemia, transaminitis, elevated DBili, lipase & CK. Imaging revealed acute comminuted fracture posterior right iliac crest, no surgical intervention. Placed on IV Protonix and Octreotide for UGIB. EGD performed  showed erosive esophagitis. GI ppx deescalated to PO protonix & carafate. Abdomen US hepatomegaly 21.1 cm, increased echogenicity, previously liver masses not visualized on this study. Step down to [facility] for PT.    OVERNIGHT EVENTS: magnesium 2gIV; ~930pm paged for sBP 80s and sternal chest pain reproducible on exam, worse with leaning forwards, no friction rub on exam, repeat BP w/o intervention 99 systolic; pt saying chest pain similar to when came in to hospital but more severe; EKG no ischemic changes, no [place] depression; ordered 1L LR bolus, BPs improved more afterwards; Phosphate 1.7 orered Potassium Phosophate 30 mEq IV x1 and Pot-NaK powder x2 doses with meals; complained of double vision o/n, neuro exam normal     SUBJECTIVE:  Patient seen and examined at bedside.  c/o feeling weak, chest pain unalleviated.  remaining ROS negative.    Vital Signs Last 12 Hrs  T(F): 100 (22 @ 05:39), Max: 100 (22 @ 05:39)  HR: 82 (22 @ 04:12) (82 - 98)  BP: 114/55 (22 @ 04:12) (86/62 - 116/69)  BP(mean): 77 (22 @ 04:12) (66 - 86)  RR: 16 (22 @ 04:12) (16 - 18)  SpO2: 96% (22 @ 04:12) (94% - 97%)  I&O's Summary    2022 07:01  -  2022 07:00  --------------------------------------------------------  IN: 1100 mL / OUT: 1675 mL / NET: -575 mL    PHYSICAL EXAM:  Constitutional: NAD, comfortable in bed.  HEENT: NC/AT, PERRLA, EOMI. Scleral icterus, MMM  Neck: Supple, no JVD  Respiratory: CTA B/L. No w/r/r.   Cardiovascular: RRR, normal S1 and S2, no m/r/g.   Gastrointestinal: +BS, soft NTND, no guarding or rebound tenderness, no palpable masses   Extremities: wwp; no cyanosis, clubbing or edema.   Vascular: Pulses equal and strong throughout.   Neurological: AAOx3, no CN deficits, strength and sensation intact throughout.   Skin: Jaundiced, diffuse ecchymosis 2/2 trauma    LABS:                        8.2    4.74  )-----------( 66       ( 2022 06:36 )             25.0     01-20    135  |  93<L>  |  7   ----------------------------<  108<H>  3.3<L>   |  27  |  0.53    Ca    8.1<L>      2022 06:36  Phos  1.1     01-20  Mg     2.0     -20    TPro  5.6<L>  /  Alb  3.5  /  TBili  4.0<H>  /  DBili  x   /  AST  228<H>  /  ALT  116<H>  /  AlkPhos  93  01-20    PT/INR - ( 2022 06:36 )   PT: 14.4 sec;   INR: 1.21       PTT - ( 2022 06:36 )  PTT:26.3 sec  Urinalysis Basic - ( 2022 08:41 )    Color: Yellow / Appearance: Clear / S.020 / pH: x  Gluc: x / Ketone: >=80 mg/dL  / Bili: Small / Urobili: 0.2 E.U./dL   Blood: x / Protein: 100 mg/dL / Nitrite: NEGATIVE   Leuk Esterase: NEGATIVE / RBC: < 5 /HPF / WBC 5-10 /HPF   Sq Epi: x / Non Sq Epi: 5-10 /HPF / Bacteria: Present /HPF    RADIOLOGY & ADDITIONAL TESTS:    MEDICATIONS  (STANDING):  folic acid 1 milliGRAM(s) Oral daily  influenza   Vaccine 0.5 milliLiter(s) IntraMuscular once  magnesium sulfate  IVPB 2 Gram(s) IV Intermittent once  multivitamin/minerals 1 Tablet(s) Oral daily  pantoprazole    Tablet 40 milliGRAM(s) Oral two times a day  potassium phosphate / sodium phosphate Powder (PHOS-NaK) 1 Packet(s) Oral three times a day with meals  potassium phosphate IVPB 30 milliMole(s) IV Intermittent once  sucralfate 1 Gram(s) Oral every 6 hours  thiamine IVPB 500 milliGRAM(s) IV Intermittent every 8 hours    MEDICATIONS  (PRN):  acetaminophen     Tablet .. 650 milliGRAM(s) Oral every 6 hours PRN Temp greater or equal to 38C (100.4F), Mild Pain (1 - 3)  benzonatate 100 milliGRAM(s) Oral every 8 hours PRN Cough  LORazepam   Injectable 2 milliGRAM(s) IV Push every 2 hours PRN CIWA-Ar score 8 or greater  trimethobenzamide Injectable 200 milliGRAM(s) IntraMuscular every 6 hours PRN Nausea and/or Vomiting     ***Transfer from [facility] to [facility]***  Hospital Course:  47F PMHx of chronic alcoholism (0.5-1L whiskey/day, w/o alcohol w/d seizures or ICU stays), anxiety, depression presenting with nausea and vomiting (some coffee brown emesis) every 2 hours from - with decreased PO intake, associated with intermittent lower abdominal pain. On admission and during [facility] stay found to be persistently hypokalemic requiring aggressive repletion. Also had macrocytic anemia, transaminitis, elevated DBili, lipase & CK. Imaging revealed acute comminuted fracture posterior right iliac crest, no surgical intervention. Placed on IV Protonix and Octreotide for UGIB. EGD performed  showed erosive esophagitis. GI ppx deescalated to PO protonix & carafate. Abdomen US hepatomegaly 21.1 cm, increased echogenicity, previously liver masses not visualized on this study. Step down to [facility] for PT. Monitor phosphate for refeeding syndrome. Consider Psych consult for depression.    OVERNIGHT EVENTS: magnesium 2gIV; ~930pm paged for sBP 80s and sternal chest pain reproducible on exam, worse with leaning forwards, no friction rub on exam, repeat BP w/o intervention 99 systolic; pt saying chest pain similar to when came in to hospital but more severe; EKG no ischemic changes, no [place] depression; ordered 1L LR bolus, BPs improved more afterwards; Phosphate 1.7 orered Potassium Phosophate 30 mEq IV x1 and Pot-NaK powder x2 doses with meals; complained of double vision o/n, neuro exam normal     SUBJECTIVE:  Patient seen and examined at bedside.  c/o feeling weak, chest pain unalleviated.  remaining ROS negative.    Vital Signs Last 12 Hrs  T(F): 100 (22 @ 05:39), Max: 100 (22 @ 05:39)  HR: 82 (22 @ 04:12) (82 - 98)  BP: 114/55 (22 @ 04:12) (86/62 - 116/69)  BP(mean): 77 (22 @ 04:12) (66 - 86)  RR: 16 (22 @ 04:12) (16 - 18)  SpO2: 96% (22 @ 04:12) (94% - 97%)  I&O's Summary    2022 07:01  -  2022 07:00  --------------------------------------------------------  IN: 1100 mL / OUT: 1675 mL / NET: -575 mL    PHYSICAL EXAM:  Constitutional: NAD, comfortable in bed.  HEENT: NC/AT, PERRLA, EOMI. Scleral icterus, MMM  Neck: Supple, no JVD  Respiratory: CTA B/L. No w/r/r.   Cardiovascular: RRR, normal S1 and S2, no m/r/g.   Gastrointestinal: +BS, soft NTND, no guarding or rebound tenderness, no palpable masses   Extremities: wwp; no cyanosis, clubbing or edema.   Vascular: Pulses equal and strong throughout.   Neurological: AAOx3, no CN deficits, strength and sensation intact throughout.   Skin: Jaundiced, diffuse ecchymosis 2/2 trauma    LABS:                        8.2    4.74  )-----------( 66       ( 2022 06:36 )             25.0     01-20    135  |  93<L>  |  7   ----------------------------<  108<H>  3.3<L>   |  27  |  0.53    Ca    8.1<L>      2022 06:36  Phos  1.1     -20  Mg     2.0     -20    TPro  5.6<L>  /  Alb  3.5  /  TBili  4.0<H>  /  DBili  x   /  AST  228<H>  /  ALT  116<H>  /  AlkPhos  93  01-20    PT/INR - ( 2022 06:36 )   PT: 14.4 sec;   INR: 1.21       PTT - ( 2022 06:36 )  PTT:26.3 sec  Urinalysis Basic - ( 2022 08:41 )    Color: Yellow / Appearance: Clear / S.020 / pH: x  Gluc: x / Ketone: >=80 mg/dL  / Bili: Small / Urobili: 0.2 E.U./dL   Blood: x / Protein: 100 mg/dL / Nitrite: NEGATIVE   Leuk Esterase: NEGATIVE / RBC: < 5 /HPF / WBC 5-10 /HPF   Sq Epi: x / Non Sq Epi: 5-10 /HPF / Bacteria: Present /HPF    RADIOLOGY & ADDITIONAL TESTS:    MEDICATIONS  (STANDING):  folic acid 1 milliGRAM(s) Oral daily  influenza   Vaccine 0.5 milliLiter(s) IntraMuscular once  magnesium sulfate  IVPB 2 Gram(s) IV Intermittent once  multivitamin/minerals 1 Tablet(s) Oral daily  pantoprazole    Tablet 40 milliGRAM(s) Oral two times a day  potassium phosphate / sodium phosphate Powder (PHOS-NaK) 1 Packet(s) Oral three times a day with meals  potassium phosphate IVPB 30 milliMole(s) IV Intermittent once  sucralfate 1 Gram(s) Oral every 6 hours  thiamine IVPB 500 milliGRAM(s) IV Intermittent every 8 hours    MEDICATIONS  (PRN):  acetaminophen     Tablet .. 650 milliGRAM(s) Oral every 6 hours PRN Temp greater or equal to 38C (100.4F), Mild Pain (1 - 3)  benzonatate 100 milliGRAM(s) Oral every 8 hours PRN Cough  LORazepam   Injectable 2 milliGRAM(s) IV Push every 2 hours PRN CIWA-Ar score 8 or greater  trimethobenzamide Injectable 200 milliGRAM(s) IntraMuscular every 6 hours PRN Nausea and/or Vomiting

## 2022-01-20 NOTE — PHYSICAL THERAPY INITIAL EVALUATION ADULT - ADDITIONAL COMMENTS
Patient is a community ambulator living alone in an elevator access apartment with 1 JYOTHI. Previously independent with all ADLs. Due to confusion and decreased lucidity, patient is an unreliable historian, stating that she lives with her .

## 2022-01-20 NOTE — PROGRESS NOTE ADULT - PROBLEM SELECTOR PLAN 10
F: None   E: Replete as necessary K>4 Mg>2  N: NPO MN, EGD AM     DVT Prophylaxis: None  GI prophylaxis: Protonix ggt  CODE STATUS: FULL F: None   E: Replete as necessary K>4 Mg>2  N: Pureed    DVT Prophylaxis: None  GI prophylaxis: PO Protonix & Carafate  CODE STATUS: FULL

## 2022-01-20 NOTE — PHYSICAL THERAPY INITIAL EVALUATION ADULT - BED MOBILITY LIMITATIONS, REHAB EVAL
Demo poor effort and self-limiting behavior with significant retropulsion while in seated position/decreased ability to use arms for pushing/pulling/decreased ability to use legs for bridging/pushing/impaired ability to control trunk for mobility

## 2022-01-21 LAB
ALBUMIN SERPL ELPH-MCNC: 3.8 G/DL — SIGNIFICANT CHANGE UP (ref 3.3–5)
ALP SERPL-CCNC: 118 U/L — SIGNIFICANT CHANGE UP (ref 40–120)
ALT FLD-CCNC: 135 U/L — HIGH (ref 10–45)
AMMONIA BLD-MCNC: 28 UMOL/L — SIGNIFICANT CHANGE UP (ref 11–55)
ANION GAP SERPL CALC-SCNC: 12 MMOL/L — SIGNIFICANT CHANGE UP (ref 5–17)
APTT BLD: 27.2 SEC — LOW (ref 27.5–35.5)
AST SERPL-CCNC: 219 U/L — HIGH (ref 10–40)
BILIRUB SERPL-MCNC: 4.3 MG/DL — HIGH (ref 0.2–1.2)
BUN SERPL-MCNC: 9 MG/DL — SIGNIFICANT CHANGE UP (ref 7–23)
CALCIUM SERPL-MCNC: 8.7 MG/DL — SIGNIFICANT CHANGE UP (ref 8.4–10.5)
CHLORIDE SERPL-SCNC: 93 MMOL/L — LOW (ref 96–108)
CK SERPL-CCNC: 150 U/L — SIGNIFICANT CHANGE UP (ref 25–170)
CO2 SERPL-SCNC: 30 MMOL/L — SIGNIFICANT CHANGE UP (ref 22–31)
CREAT SERPL-MCNC: 0.45 MG/DL — LOW (ref 0.5–1.3)
ETHYLENE GLYCOL SERPLBLD-MCNC: <5 MG/DL — SIGNIFICANT CHANGE UP
GLUCOSE SERPL-MCNC: 232 MG/DL — HIGH (ref 70–99)
HCT VFR BLD CALC: 25.5 % — LOW (ref 34.5–45)
HGB BLD-MCNC: 8.2 G/DL — LOW (ref 11.5–15.5)
INR BLD: 1.13 — SIGNIFICANT CHANGE UP (ref 0.88–1.16)
MAGNESIUM SERPL-MCNC: 1.7 MG/DL — SIGNIFICANT CHANGE UP (ref 1.6–2.6)
MCHC RBC-ENTMCNC: 32.2 GM/DL — SIGNIFICANT CHANGE UP (ref 32–36)
MCHC RBC-ENTMCNC: 32.8 PG — SIGNIFICANT CHANGE UP (ref 27–34)
MCV RBC AUTO: 102 FL — HIGH (ref 80–100)
METHANOL BLD-MCNC: <.01 G/DL — SIGNIFICANT CHANGE UP (ref 0–0.01)
NRBC # BLD: 0 /100 WBCS — SIGNIFICANT CHANGE UP (ref 0–0)
PHOSPHATE SERPL-MCNC: 2 MG/DL — LOW (ref 2.5–4.5)
PLATELET # BLD AUTO: 141 K/UL — LOW (ref 150–400)
POTASSIUM SERPL-MCNC: 3.4 MMOL/L — LOW (ref 3.5–5.3)
POTASSIUM SERPL-SCNC: 3.4 MMOL/L — LOW (ref 3.5–5.3)
PROT SERPL-MCNC: 6.2 G/DL — SIGNIFICANT CHANGE UP (ref 6–8.3)
PROTHROM AB SERPL-ACNC: 13.5 SEC — SIGNIFICANT CHANGE UP (ref 10.6–13.6)
RBC # BLD: 2.5 M/UL — LOW (ref 3.8–5.2)
RBC # FLD: 15.6 % — HIGH (ref 10.3–14.5)
SODIUM SERPL-SCNC: 135 MMOL/L — SIGNIFICANT CHANGE UP (ref 135–145)
WBC # BLD: 4.69 K/UL — SIGNIFICANT CHANGE UP (ref 3.8–10.5)
WBC # FLD AUTO: 4.69 K/UL — SIGNIFICANT CHANGE UP (ref 3.8–10.5)

## 2022-01-21 PROCEDURE — 99233 SBSQ HOSP IP/OBS HIGH 50: CPT | Mod: GC

## 2022-01-21 PROCEDURE — 99255 IP/OBS CONSLTJ NEW/EST HI 80: CPT

## 2022-01-21 RX ORDER — SODIUM CHLORIDE 9 MG/ML
1000 INJECTION, SOLUTION INTRAVENOUS
Refills: 0 | Status: DISCONTINUED | OUTPATIENT
Start: 2022-01-21 | End: 2022-01-23

## 2022-01-21 RX ORDER — POTASSIUM CHLORIDE 20 MEQ
10 PACKET (EA) ORAL
Refills: 0 | Status: COMPLETED | OUTPATIENT
Start: 2022-01-21 | End: 2022-01-21

## 2022-01-21 RX ORDER — POTASSIUM PHOSPHATE, MONOBASIC POTASSIUM PHOSPHATE, DIBASIC 236; 224 MG/ML; MG/ML
30 INJECTION, SOLUTION INTRAVENOUS ONCE
Refills: 0 | Status: COMPLETED | OUTPATIENT
Start: 2022-01-21 | End: 2022-01-21

## 2022-01-21 RX ORDER — POTASSIUM CHLORIDE 20 MEQ
40 PACKET (EA) ORAL ONCE
Refills: 0 | Status: COMPLETED | OUTPATIENT
Start: 2022-01-21 | End: 2022-01-22

## 2022-01-21 RX ORDER — POLYETHYLENE GLYCOL 3350 17 G/17G
17 POWDER, FOR SOLUTION ORAL DAILY
Refills: 0 | Status: DISCONTINUED | OUTPATIENT
Start: 2022-01-21 | End: 2022-01-26

## 2022-01-21 RX ORDER — MAGNESIUM SULFATE 500 MG/ML
2 VIAL (ML) INJECTION ONCE
Refills: 0 | Status: COMPLETED | OUTPATIENT
Start: 2022-01-21 | End: 2022-01-22

## 2022-01-21 RX ADMIN — Medication 105 MILLIGRAM(S): at 05:12

## 2022-01-21 RX ADMIN — PANTOPRAZOLE SODIUM 40 MILLIGRAM(S): 20 TABLET, DELAYED RELEASE ORAL at 17:54

## 2022-01-21 RX ADMIN — Medication 1 MILLIGRAM(S): at 11:20

## 2022-01-21 RX ADMIN — Medication 2 MILLIGRAM(S): at 06:48

## 2022-01-21 RX ADMIN — POTASSIUM PHOSPHATE, MONOBASIC POTASSIUM PHOSPHATE, DIBASIC 83.33 MILLIMOLE(S): 236; 224 INJECTION, SOLUTION INTRAVENOUS at 15:17

## 2022-01-21 RX ADMIN — Medication 105 MILLIGRAM(S): at 14:46

## 2022-01-21 RX ADMIN — Medication 1 GRAM(S): at 11:20

## 2022-01-21 RX ADMIN — SODIUM CHLORIDE 500 MILLILITER(S): 9 INJECTION, SOLUTION INTRAVENOUS at 00:58

## 2022-01-21 RX ADMIN — Medication 1 GRAM(S): at 17:54

## 2022-01-21 RX ADMIN — Medication 100 MILLIEQUIVALENT(S): at 23:40

## 2022-01-21 RX ADMIN — Medication 1 TABLET(S): at 11:20

## 2022-01-21 NOTE — PROGRESS NOTE ADULT - SUBJECTIVE AND OBJECTIVE BOX
**INCOMPLETE NOTE    OVERNIGHT EVENTS:    SUBJECTIVE:  Patient seen and examined at bedside.    Vital Signs Last 12 Hrs  T(F): 97.5 (01-21-22 @ 06:06), Max: 98.1 (01-20-22 @ 22:13)  HR: --  BP: 105/65 (01-21-22 @ 03:19) (92/57 - 105/65)  BP(mean): 80 (01-21-22 @ 03:19) (70 - 80)  RR: 18 (01-21-22 @ 03:19) (18 - 18)  SpO2: 95% (01-21-22 @ 03:19) (94% - 100%)  I&O's Summary    20 Jan 2022 07:01  -  21 Jan 2022 07:00  --------------------------------------------------------  IN: 1105 mL / OUT: 2450 mL / NET: -1345 mL        PHYSICAL EXAM:  Constitutional: NAD, comfortable in bed.  HEENT: NC/AT, PERRLA, EOMI, no conjunctival pallor or scleral icterus, MMM  Neck: Supple, no JVD  Respiratory: CTA B/L. No w/r/r.   Cardiovascular: RRR, normal S1 and S2, no m/r/g.   Gastrointestinal: +BS, soft NTND, no guarding or rebound tenderness, no palpable masses   Extremities: wwp; no cyanosis, clubbing or edema.   Vascular: Pulses equal and strong throughout.   Neurological: AAOx3, no CN deficits, strength and sensation intact throughout.   Skin: No gross skin abnormalities or rashes        LABS:                        8.2    4.74  )-----------( 66       ( 20 Jan 2022 06:36 )             25.0     01-20    135  |  93<L>  |  7   ----------------------------<  108<H>  3.3<L>   |  27  |  0.53    Ca    8.1<L>      20 Jan 2022 06:36  Phos  1.1     01-20  Mg     2.0     01-20    TPro  5.6<L>  /  Alb  3.5  /  TBili  4.0<H>  /  DBili  x   /  AST  228<H>  /  ALT  116<H>  /  AlkPhos  93  01-20    PT/INR - ( 20 Jan 2022 06:36 )   PT: 14.4 sec;   INR: 1.21          PTT - ( 20 Jan 2022 06:36 )  PTT:26.3 sec        RADIOLOGY & ADDITIONAL TESTS:    MEDICATIONS  (STANDING):  chlordiazePOXIDE 50 milliGRAM(s) Oral every 8 hours  folic acid 1 milliGRAM(s) Oral daily  influenza   Vaccine 0.5 milliLiter(s) IntraMuscular once  lactated ringers. 1000 milliLiter(s) (500 mL/Hr) IV Continuous <Continuous>  magnesium sulfate  IVPB 2 Gram(s) IV Intermittent once  multivitamin/minerals 1 Tablet(s) Oral daily  pantoprazole    Tablet 40 milliGRAM(s) Oral two times a day  sucralfate 1 Gram(s) Oral every 6 hours  thiamine IVPB 500 milliGRAM(s) IV Intermittent every 8 hours    MEDICATIONS  (PRN):  acetaminophen     Tablet .. 650 milliGRAM(s) Oral every 6 hours PRN Temp greater or equal to 38C (100.4F), Mild Pain (1 - 3)  benzonatate 100 milliGRAM(s) Oral every 8 hours PRN Cough  LORazepam   Injectable 2 milliGRAM(s) IV Push every 2 hours PRN CIWA-Ar score 8 or greater  trimethobenzamide Injectable 200 milliGRAM(s) IntraMuscular every 6 hours PRN Nausea and/or Vomiting   OVERNIGHT EVENTS: CIWA 0 at 9pm as asleep; LR bolus given over 2 hrs for sBPs 90s; trying to leave AMA in morning, CIWA ~5, given 2mg IV ativan for agitation, ordered 1 to 1    SUBJECTIVE: Patient seen and examined at bedside.  No complaints, ROS negative    Vital Signs Last 12 Hrs  T(F): 97.5 (01-21-22 @ 06:06), Max: 98.1 (01-20-22 @ 22:13)  HR: --  BP: 105/65 (01-21-22 @ 03:19) (92/57 - 105/65)  BP(mean): 80 (01-21-22 @ 03:19) (70 - 80)  RR: 18 (01-21-22 @ 03:19) (18 - 18)  SpO2: 95% (01-21-22 @ 03:19) (94% - 100%)  I&O's Summary    20 Jan 2022 07:01  -  21 Jan 2022 07:00  --------------------------------------------------------  IN: 1105 mL / OUT: 2450 mL / NET: -1345 mL    PHYSICAL EXAM:  Constitutional: NAD, comfortable in bed, CIWA0, tangential speech  HEENT: NC/AT, PERRLA, EOMI, scleral icterus, MMM  Neck: Supple, no JVD  Respiratory: CTA B/L. No w/r/r.   Cardiovascular: RRR, normal S1 and S2, no m/r/g.   Gastrointestinal: +BS, firm, NTND, no guarding or rebound tenderness, no palpable masses   Extremities: wwp; no cyanosis, clubbing or edema.   Vascular: Pulses equal and strong throughout.   Neurological: AAOx3, no CN deficits, strength and sensation intact throughout.   Skin: Diffuse ecchymoses from fall, jaundice    LABS:                        8.2    4.74  )-----------( 66       ( 20 Jan 2022 06:36 )             25.0     01-20    135  |  93<L>  |  7   ----------------------------<  108<H>  3.3<L>   |  27  |  0.53    Ca    8.1<L>      20 Jan 2022 06:36  Phos  1.1     01-20  Mg     2.0     01-20    TPro  5.6<L>  /  Alb  3.5  /  TBili  4.0<H>  /  DBili  x   /  AST  228<H>  /  ALT  116<H>  /  AlkPhos  93  01-20    PT/INR - ( 20 Jan 2022 06:36 )   PT: 14.4 sec;   INR: 1.21        PTT - ( 20 Jan 2022 06:36 )  PTT:26.3 sec    RADIOLOGY & ADDITIONAL TESTS:    MEDICATIONS  (STANDING):  chlordiazePOXIDE 50 milliGRAM(s) Oral every 8 hours  folic acid 1 milliGRAM(s) Oral daily  influenza   Vaccine 0.5 milliLiter(s) IntraMuscular once  lactated ringers. 1000 milliLiter(s) (500 mL/Hr) IV Continuous <Continuous>  magnesium sulfate  IVPB 2 Gram(s) IV Intermittent once  multivitamin/minerals 1 Tablet(s) Oral daily  pantoprazole    Tablet 40 milliGRAM(s) Oral two times a day  sucralfate 1 Gram(s) Oral every 6 hours  thiamine IVPB 500 milliGRAM(s) IV Intermittent every 8 hours    MEDICATIONS  (PRN):  acetaminophen     Tablet .. 650 milliGRAM(s) Oral every 6 hours PRN Temp greater or equal to 38C (100.4F), Mild Pain (1 - 3)  benzonatate 100 milliGRAM(s) Oral every 8 hours PRN Cough  LORazepam   Injectable 2 milliGRAM(s) IV Push every 2 hours PRN CIWA-Ar score 8 or greater  trimethobenzamide Injectable 200 milliGRAM(s) IntraMuscular every 6 hours PRN Nausea and/or Vomiting   ***Transfer from [facility] to [facility]***  Hospital Course:  47F PMHx of chronic alcoholism (0.5-1L whiskey/day, w/o alcohol w/d seizures or ICU stays), anxiety, depression presenting with nausea and vomiting (some coffee brown emesis) every 2 hours from 1/16-1/18 with decreased PO intake, associated with intermittent lower abdominal pain. On admission and during [facility] stay found to be persistently hypokalemic requiring aggressive repletion. Also had macrocytic anemia, transaminitis, elevated DBili, lipase & CK. Imaging revealed acute comminuted fracture posterior right iliac crest, no surgical intervention. Placed on IV Protonix and Octreotide for UGIB. EGD performed 1/19 showed erosive esophagitis. GI ppx deescalated to PO protonix & carafate. Abdomen US hepatomegaly 21.1 cm, increased echogenicity, previously liver masses not visualized on this study. Step down to [facility] for PT. Monitor phosphate for refeeding syndrome. Patient attempting to leave AMA, psych consulted, determinationdoes not currently have capacity to leave AMA. CIWA 0-5.    OVERNIGHT EVENTS: CIWA 0 at 9pm as asleep; LR bolus given over 2 hrs for sBPs 90s; trying to leave AMA in morning, CIWA ~5, given 2mg IV ativan for agitation, ordered 1 to 1    SUBJECTIVE: Patient seen and examined at bedside.  No complaints, ROS negative    Vital Signs Last 12 Hrs  T(F): 97.5 (01-21-22 @ 06:06), Max: 98.1 (01-20-22 @ 22:13)  HR: --  BP: 105/65 (01-21-22 @ 03:19) (92/57 - 105/65)  BP(mean): 80 (01-21-22 @ 03:19) (70 - 80)  RR: 18 (01-21-22 @ 03:19) (18 - 18)  SpO2: 95% (01-21-22 @ 03:19) (94% - 100%)  I&O's Summary    20 Jan 2022 07:01  -  21 Jan 2022 07:00  --------------------------------------------------------  IN: 1105 mL / OUT: 2450 mL / NET: -1345 mL    PHYSICAL EXAM:  Constitutional: NAD, comfortable in bed, CIWA0, tangential speech  HEENT: NC/AT, PERRLA, EOMI, scleral icterus, MMM  Neck: Supple, no JVD  Respiratory: CTA B/L. No w/r/r.   Cardiovascular: RRR, normal S1 and S2, no m/r/g.   Gastrointestinal: +BS, firm, NTND, no guarding or rebound tenderness, no palpable masses   Extremities: wwp; no cyanosis, clubbing or edema.   Vascular: Pulses equal and strong throughout.   Neurological: AAOx3, no CN deficits, strength and sensation intact throughout.   Skin: Diffuse ecchymoses from fall, jaundice    LABS:                        8.2    4.74  )-----------( 66       ( 20 Jan 2022 06:36 )             25.0     01-20    135  |  93<L>  |  7   ----------------------------<  108<H>  3.3<L>   |  27  |  0.53    Ca    8.1<L>      20 Jan 2022 06:36  Phos  1.1     01-20  Mg     2.0     01-20    TPro  5.6<L>  /  Alb  3.5  /  TBili  4.0<H>  /  DBili  x   /  AST  228<H>  /  ALT  116<H>  /  AlkPhos  93  01-20    PT/INR - ( 20 Jan 2022 06:36 )   PT: 14.4 sec;   INR: 1.21        PTT - ( 20 Jan 2022 06:36 )  PTT:26.3 sec    RADIOLOGY & ADDITIONAL TESTS:    MEDICATIONS  (STANDING):  chlordiazePOXIDE 50 milliGRAM(s) Oral every 8 hours  folic acid 1 milliGRAM(s) Oral daily  influenza   Vaccine 0.5 milliLiter(s) IntraMuscular once  lactated ringers. 1000 milliLiter(s) (500 mL/Hr) IV Continuous <Continuous>  magnesium sulfate  IVPB 2 Gram(s) IV Intermittent once  multivitamin/minerals 1 Tablet(s) Oral daily  pantoprazole    Tablet 40 milliGRAM(s) Oral two times a day  sucralfate 1 Gram(s) Oral every 6 hours  thiamine IVPB 500 milliGRAM(s) IV Intermittent every 8 hours    MEDICATIONS  (PRN):  acetaminophen     Tablet .. 650 milliGRAM(s) Oral every 6 hours PRN Temp greater or equal to 38C (100.4F), Mild Pain (1 - 3)  benzonatate 100 milliGRAM(s) Oral every 8 hours PRN Cough  LORazepam   Injectable 2 milliGRAM(s) IV Push every 2 hours PRN CIWA-Ar score 8 or greater  trimethobenzamide Injectable 200 milliGRAM(s) IntraMuscular every 6 hours PRN Nausea and/or Vomiting

## 2022-01-21 NOTE — BH CONSULTATION LIAISON ASSESSMENT NOTE - RISK ASSESSMENT
RF: substance use  PF: future oriented, denies current SI/intent/plan, no history of SI/SA/psych hospitalization, reports social support from  and neighbors.  Risk of harm to self/others considered low.

## 2022-01-21 NOTE — PROGRESS NOTE ADULT - PROBLEM SELECTOR PLAN 9
Likely from GI losses  - replete as needed Likely from GI losses vs refeeding syndrome. K , Mg, Phos low daily  - replete as needed

## 2022-01-21 NOTE — PROGRESS NOTE ADULT - PROBLEM SELECTOR PLAN 8
Not on home medication  - Psych consult on RMF? Not on home medication  - Psych consulted for capacity as patient wants to AMA  - SW needed for discharge

## 2022-01-21 NOTE — BH CONSULTATION LIAISON ASSESSMENT NOTE - SUMMARY
Ms. Vivar is a 47-year-old, unemployed, , domiciled white woman with a self-reported PPHx of anxiety, depression, and PTSD (not currently engaged in treatment), and alcohol use disorder who was admitted to the hospital after N/V with coffee ground emesis x few days. While cleared of bleeding risk per medical team, patient has been frequently falling at home and unable to ambulate independently. Likely will require rehab, but patient wants to leave AMA. Psych consulted for capacity assessment.     Upon assessment, patient exhibits tangential, illogical, and at times disorganized thought process thought to be delirium associated with alcohol withdrawal (received IV Ativan for CIWA 9 this AM). Patient is unable to demonstrate a thorough understanding of her physical state, her current inability to ambulate, nor the risks to her health/wellbeing associated with leaving AMA and as such is determined to NOT have capacity to leave AMA at this time.     RECOMMENDATIONS  -- **Patient does not currently have capacity to leave AMA.     -- Continue CIWA protocol, prn Ativan.  -- Continue IV thiamine regimen.  -- Continue 1:1 as needed for agitation/elopement/fall risk concerns.  -- **Please repeat EKG to trend QTc (on 1/18 it was 596)  -- If QTc has normalized, may use Haldol 5mg PO/IM/IV q6hour PRN for agitation.   -- Encourage substance use treatment/rehab upon discharge.  -- No psychiatric contraindications to discharge, call with questions.

## 2022-01-21 NOTE — PROGRESS NOTE ADULT - PROBLEM SELECTOR PLAN 3
On admission, lipase 976 and diffuse abdominal pain, CT findings negative. Lactate 7.3 > 1.9, no signs of end organ failure, patient not tolerated PO intake with nausea and vomiting  - BClx & UClx NGTD

## 2022-01-21 NOTE — BH CONSULTATION LIAISON ASSESSMENT NOTE - NSBHADMITCOUNSEL_PSY_A_CORE
instructions for management, treatment and follow up/importance of adherence to chosen treatment/risk factor reduction/client/family/caregiver education

## 2022-01-21 NOTE — PROCEDURE NOTE - NSICDXPROCEDURE_GEN_ALL_CORE_FT
PROCEDURES:  Insertion of intravenous catheter with ultrasound guidance 22-Jan-2022 00:54:51  Rima Sood

## 2022-01-21 NOTE — PROGRESS NOTE ADULT - PROBLEM SELECTOR PLAN 1
x2d hx nausea and coffee ground emesis with limited PO intake. Macrocytic anemia. s/p Octreotide & Protonix ggt  - EGD 1/19: erosive esophagitis  - GI PPx: PO Protonix 40mg Qd, Carafate 1g q6h  - Nausea PRN: Tigan 200mg q6h

## 2022-01-21 NOTE — PROGRESS NOTE ADULT - PROBLEM SELECTOR PLAN 7
Acute GI blood loss anemia + nutritional deficiencies. EGD: Erosive esophagitis. B12 elevated, low folate.  - Maintain active T&S, large bore IV access, transfuse hgb<7

## 2022-01-21 NOTE — BH CONSULTATION LIAISON ASSESSMENT NOTE - NSBHCHARTREVIEWVS_PSY_A_CORE FT
Vital Signs Last 24 Hrs  T(C): 36.7 (21 Jan 2022 11:12), Max: 37.2 (20 Jan 2022 18:49)  T(F): 98 (21 Jan 2022 11:12), Max: 99 (20 Jan 2022 18:49)  HR: --  BP: 109/74 (21 Jan 2022 11:08) (88/50 - 109/74)  BP(mean): 86 (21 Jan 2022 11:08) (55 - 86)  RR: 17 (21 Jan 2022 08:25) (17 - 18)  SpO2: 97% (21 Jan 2022 12:00) (94% - 100%)

## 2022-01-21 NOTE — BH CONSULTATION LIAISON ASSESSMENT NOTE - OTHER PAST PSYCHIATRIC HISTORY (INCLUDE DETAILS REGARDING ONSET, COURSE OF ILLNESS, INPATIENT/OUTPATIENT TREATMENT)
reports depression, anxiety, PTSD -- engaged in psychotherapy in the past, but not currently in treatment - denies ever being on psych medication.

## 2022-01-21 NOTE — PROGRESS NOTE ADULT - PROBLEM SELECTOR PLAN 2
Chronic use of 0.5-1L of vodka daily. Denies a history of alcohol withdrawal seizures or ICU stays.   - Thiamine, Folate, MVT supplementation  - PRN Ativan for CIWA >8

## 2022-01-21 NOTE — PROGRESS NOTE ADULT - PROBLEM SELECTOR PLAN 4
I/s/o active alcohol use. CT & US marked hepatomegaly and steatosis. Current elevation with AST>ALT, elevated bili, and coagulopathy. Utox negative.  - Maddrey Discriminant Function 15 on admission, good prognosis  - Trend CK(rhabdo 2/2 fall), INR, CMP daily

## 2022-01-21 NOTE — BH CONSULTATION LIAISON ASSESSMENT NOTE - DESCRIPTION
; currently unemployed and financially supported by  whom lives nearby. ; currently unemployed and financially supported by  whom lives nearby. reports to be from [place], moved [place] in 1999

## 2022-01-21 NOTE — BH CONSULTATION LIAISON ASSESSMENT NOTE - HPI (INCLUDE ILLNESS QUALITY, SEVERITY, DURATION, TIMING, CONTEXT, MODIFYING FACTORS, ASSOCIATED SIGNS AND SYMPTOMS)
Ms. Vivar is a 47-year-old, unemployed, , domiciled white woman with a self-reported PPHx of anxiety, depression, and PTSD (not currently engaged in treatment), and alcohol use disorder who was admitted to the hospital after N/V with coffee ground emesis x few days. While cleared of bleeding risk per medical team, patient has been frequently falling at home and unable to ambulate independently. Likely will require rehab, but patient wants to leave AMA. Psych consulted for capacity assessment. Staff report patient has tried to elope multiple times over the past 24 hours and has become agitated at times requiring 1:1. Calmer today.     Patient assessed at bedside with 1:1 present. Is calm and cooperative with assessment. Ox3, but tangential and at times illogical and disorganized. Unable to fully explain what brought her to the hospital stating merely "I've been falling a lot." States she would like to go home and doesn't feel she needs physical rehab due to an outpatient PT located in close proximity to her home. States her  lives a few blocks away (they are currently ) and he would help her and that she has some neighbors that she is close with. However, upon further inquiry, she states her  is currently in [place] on a trip and she has not reached out to any of the neighbors to see if they would be able to assist. When asked about the risks of leaving AMA, she admits she may fall again, but unable to articulate the greater risk to her well-being and seems unaware that she is unable to walk without the assistance of two people stating "I'm fine if I have a walker."     Reports psych history of anxiety/depression/PTSD for which she was prescribed Prozac in the past but decided not to take. She states she has had five therapists in the past, but is not currently in treatment. Compared to past accounts in the chart, patient minimized her alcohol use to writer stating first she only has two shots of scotch a day, then sating 1/4 bottle per day. Reports she started drinking daily three years ago after the deaths of her parents and brother. denies all other substance use. Denies admission to rehab or psych hospitalizations. No SI/HI/AVH. Reports she is  from her  (no children) and that he is still primarily supporting her financially. States multiple people have told her she needs to decrease her alcohol consumption, but doesn't have a clear plan or intention to do so.     Of note, the medical team reached out to patient's  and confirmed that he is not available to be a support for patient if she were to go home AMA.

## 2022-01-21 NOTE — BH CONSULTATION LIAISON ASSESSMENT NOTE - NSICDXBHSECONDARYDX_PSY_ALL_CORE
Pancreatitis   K85.90  Coffee ground emesis   K92.0  Transaminitis   R74.01  Macrocytic anemia   D53.9  Pancytopenia   D61.818  Anxiety and depression   F41.9  Hypokalemia   E87.6  Hepatic steatosis   K76.0  Healthcare maintenance   Z00.00

## 2022-01-21 NOTE — BH CONSULTATION LIAISON ASSESSMENT NOTE - CURRENT MEDICATION
MEDICATIONS  (STANDING):  folic acid 1 milliGRAM(s) Oral daily  influenza   Vaccine 0.5 milliLiter(s) IntraMuscular once  lactated ringers. 1000 milliLiter(s) (500 mL/Hr) IV Continuous <Continuous>  LORazepam   Injectable 2 milliGRAM(s) IV Push every 8 hours  magnesium sulfate  IVPB 2 Gram(s) IV Intermittent once  multivitamin/minerals 1 Tablet(s) Oral daily  pantoprazole    Tablet 40 milliGRAM(s) Oral two times a day  potassium chloride   Powder 40 milliEquivalent(s) Oral once  potassium phosphate IVPB 30 milliMole(s) IV Intermittent once  sucralfate 1 Gram(s) Oral every 6 hours  thiamine IVPB 500 milliGRAM(s) IV Intermittent every 8 hours    MEDICATIONS  (PRN):  acetaminophen     Tablet .. 650 milliGRAM(s) Oral every 6 hours PRN Temp greater or equal to 38C (100.4F), Mild Pain (1 - 3)  benzonatate 100 milliGRAM(s) Oral every 8 hours PRN Cough  LORazepam   Injectable 2 milliGRAM(s) IV Push every 2 hours PRN CIWA-Ar score 8 or greater  trimethobenzamide Injectable 200 milliGRAM(s) IntraMuscular every 6 hours PRN Nausea and/or Vomiting

## 2022-01-21 NOTE — BH CONSULTATION LIAISON ASSESSMENT NOTE - NSBHCONSULTFOLLOWAFTERCARE_PSY_A_CORE FT
Encourage for substance use treatment upon discharge from rehab hospital.  Encourage for substance use treatment upon discharge from rehab hospital.   Please provide pt with the following resources for alcohol treatment:    [facility]   Main: [phone]   FAX: [phone]   Site: www[facility].org    [facility]   [address] [place] [address] [id number] [phone]

## 2022-01-21 NOTE — BH CONSULTATION LIAISON ASSESSMENT NOTE - CASE SUMMARY
48yo woman with a history of alcohol use disorder, self-reported PPH of depression, anxiety and PTSD, not currently engaged in outpt mental health care or substance treatment, previously engaged in AA, who presents with disorganized thinking and fluctuations in sensorium with periods of confusion, agitation and disorientation suggestive of delirium likely secondary to ongoing alcohol withdrawal, a/w weakness, gait instability, possible GI bleed planned for EGD. Pt is grossly unable to appreciate her situation including reason for hospitalization (cites falls and vomiting but no appreciation of details or severity), benefits of [facility] placement, or risks of AMA discharge. She currently lacks capacity to leave AMA or refuse subacute rehab. Not currently able to meaningfully engage in discussion of additional substance treatment options. Agree with continued treatment with lorazepam, CIWA monitoring, nutritional supplementation including parenteral thiamine for possible Wernicke’s (given AMS, gait disturbance, +some involuntary eye movements and impaired tracking observed). Recommend discussion of substance treatment including MAT for AUD when pt is better able to participate. Will remain available as needed, please contact with questions.  DDx alcohol use d/o with withdrawal delirium, r/ Wernicke’s, r/o other neurocognitive d/o  Depression/anxiety by hx

## 2022-01-22 LAB
ALBUMIN SERPL ELPH-MCNC: 3 G/DL — LOW (ref 3.3–5)
ALP SERPL-CCNC: 161 U/L — HIGH (ref 40–120)
ALT FLD-CCNC: 137 U/L — HIGH (ref 10–45)
ANION GAP SERPL CALC-SCNC: 12 MMOL/L — SIGNIFICANT CHANGE UP (ref 5–17)
ANION GAP SERPL CALC-SCNC: 8 MMOL/L — SIGNIFICANT CHANGE UP (ref 5–17)
APTT BLD: 27.1 SEC — LOW (ref 27.5–35.5)
AST SERPL-CCNC: 224 U/L — HIGH (ref 10–40)
BILIRUB SERPL-MCNC: 3.5 MG/DL — HIGH (ref 0.2–1.2)
BUN SERPL-MCNC: 7 MG/DL — SIGNIFICANT CHANGE UP (ref 7–23)
BUN SERPL-MCNC: 8 MG/DL — SIGNIFICANT CHANGE UP (ref 7–23)
CALCIUM SERPL-MCNC: 8.2 MG/DL — LOW (ref 8.4–10.5)
CALCIUM SERPL-MCNC: 8.6 MG/DL — SIGNIFICANT CHANGE UP (ref 8.4–10.5)
CHLORIDE SERPL-SCNC: 100 MMOL/L — SIGNIFICANT CHANGE UP (ref 96–108)
CHLORIDE SERPL-SCNC: 100 MMOL/L — SIGNIFICANT CHANGE UP (ref 96–108)
CK SERPL-CCNC: 109 U/L — SIGNIFICANT CHANGE UP (ref 25–170)
CO2 SERPL-SCNC: 26 MMOL/L — SIGNIFICANT CHANGE UP (ref 22–31)
CO2 SERPL-SCNC: 30 MMOL/L — SIGNIFICANT CHANGE UP (ref 22–31)
CREAT SERPL-MCNC: 0.46 MG/DL — LOW (ref 0.5–1.3)
CREAT SERPL-MCNC: 0.47 MG/DL — LOW (ref 0.5–1.3)
GLUCOSE SERPL-MCNC: 102 MG/DL — HIGH (ref 70–99)
GLUCOSE SERPL-MCNC: 106 MG/DL — HIGH (ref 70–99)
HCT VFR BLD CALC: 23.3 % — LOW (ref 34.5–45)
HCT VFR BLD CALC: 24.7 % — LOW (ref 34.5–45)
HGB BLD-MCNC: 7.4 G/DL — LOW (ref 11.5–15.5)
HGB BLD-MCNC: 7.8 G/DL — LOW (ref 11.5–15.5)
INR BLD: 1.14 — SIGNIFICANT CHANGE UP (ref 0.88–1.16)
MAGNESIUM SERPL-MCNC: 2.1 MG/DL — SIGNIFICANT CHANGE UP (ref 1.6–2.6)
MCHC RBC-ENTMCNC: 31.6 GM/DL — LOW (ref 32–36)
MCHC RBC-ENTMCNC: 31.8 GM/DL — LOW (ref 32–36)
MCHC RBC-ENTMCNC: 33 PG — SIGNIFICANT CHANGE UP (ref 27–34)
MCHC RBC-ENTMCNC: 33.2 PG — SIGNIFICANT CHANGE UP (ref 27–34)
MCV RBC AUTO: 104 FL — HIGH (ref 80–100)
MCV RBC AUTO: 105.1 FL — HIGH (ref 80–100)
NRBC # BLD: 0 /100 WBCS — SIGNIFICANT CHANGE UP (ref 0–0)
NRBC # BLD: 0 /100 WBCS — SIGNIFICANT CHANGE UP (ref 0–0)
PHOSPHATE SERPL-MCNC: 2.4 MG/DL — LOW (ref 2.5–4.5)
PHOSPHATE SERPL-MCNC: 3.7 MG/DL — SIGNIFICANT CHANGE UP (ref 2.5–4.5)
PLATELET # BLD AUTO: 137 K/UL — LOW (ref 150–400)
PLATELET # BLD AUTO: 161 K/UL — SIGNIFICANT CHANGE UP (ref 150–400)
POTASSIUM SERPL-MCNC: 3.4 MMOL/L — LOW (ref 3.5–5.3)
POTASSIUM SERPL-MCNC: 4.3 MMOL/L — SIGNIFICANT CHANGE UP (ref 3.5–5.3)
POTASSIUM SERPL-SCNC: 3.4 MMOL/L — LOW (ref 3.5–5.3)
POTASSIUM SERPL-SCNC: 4.3 MMOL/L — SIGNIFICANT CHANGE UP (ref 3.5–5.3)
PROT SERPL-MCNC: 5.1 G/DL — LOW (ref 6–8.3)
PROTHROM AB SERPL-ACNC: 13.6 SEC — SIGNIFICANT CHANGE UP (ref 10.6–13.6)
RBC # BLD: 2.24 M/UL — LOW (ref 3.8–5.2)
RBC # BLD: 2.35 M/UL — LOW (ref 3.8–5.2)
RBC # FLD: 15.9 % — HIGH (ref 10.3–14.5)
RBC # FLD: 16.4 % — HIGH (ref 10.3–14.5)
SODIUM SERPL-SCNC: 138 MMOL/L — SIGNIFICANT CHANGE UP (ref 135–145)
SODIUM SERPL-SCNC: 138 MMOL/L — SIGNIFICANT CHANGE UP (ref 135–145)
WBC # BLD: 3.09 K/UL — LOW (ref 3.8–10.5)
WBC # BLD: 4.02 K/UL — SIGNIFICANT CHANGE UP (ref 3.8–10.5)
WBC # FLD AUTO: 3.09 K/UL — LOW (ref 3.8–10.5)
WBC # FLD AUTO: 4.02 K/UL — SIGNIFICANT CHANGE UP (ref 3.8–10.5)

## 2022-01-22 PROCEDURE — 99232 SBSQ HOSP IP/OBS MODERATE 35: CPT

## 2022-01-22 PROCEDURE — 93010 ELECTROCARDIOGRAM REPORT: CPT

## 2022-01-22 PROCEDURE — 99233 SBSQ HOSP IP/OBS HIGH 50: CPT | Mod: GC

## 2022-01-22 RX ORDER — POTASSIUM CHLORIDE 20 MEQ
10 PACKET (EA) ORAL
Refills: 0 | Status: DISCONTINUED | OUTPATIENT
Start: 2022-01-22 | End: 2022-01-22

## 2022-01-22 RX ORDER — POTASSIUM CHLORIDE 20 MEQ
40 PACKET (EA) ORAL ONCE
Refills: 0 | Status: COMPLETED | OUTPATIENT
Start: 2022-01-22 | End: 2022-01-22

## 2022-01-22 RX ORDER — POTASSIUM CHLORIDE 20 MEQ
20 PACKET (EA) ORAL ONCE
Refills: 0 | Status: DISCONTINUED | OUTPATIENT
Start: 2022-01-22 | End: 2022-01-23

## 2022-01-22 RX ADMIN — Medication 1 GRAM(S): at 00:39

## 2022-01-22 RX ADMIN — Medication 40 MILLIEQUIVALENT(S): at 10:05

## 2022-01-22 RX ADMIN — PANTOPRAZOLE SODIUM 40 MILLIGRAM(S): 20 TABLET, DELAYED RELEASE ORAL at 17:14

## 2022-01-22 RX ADMIN — Medication 25 GRAM(S): at 00:42

## 2022-01-22 RX ADMIN — Medication 100 MILLIEQUIVALENT(S): at 01:54

## 2022-01-22 RX ADMIN — PANTOPRAZOLE SODIUM 40 MILLIGRAM(S): 20 TABLET, DELAYED RELEASE ORAL at 07:17

## 2022-01-22 RX ADMIN — Medication 40 MILLIEQUIVALENT(S): at 00:39

## 2022-01-22 RX ADMIN — Medication 1 TABLET(S): at 12:03

## 2022-01-22 RX ADMIN — Medication 1 GRAM(S): at 11:31

## 2022-01-22 RX ADMIN — Medication 100 MILLIEQUIVALENT(S): at 00:42

## 2022-01-22 RX ADMIN — Medication 1 GRAM(S): at 07:18

## 2022-01-22 RX ADMIN — Medication 1 GRAM(S): at 17:14

## 2022-01-22 RX ADMIN — Medication 1 MILLIGRAM(S): at 12:03

## 2022-01-22 RX ADMIN — Medication 1 GRAM(S): at 23:26

## 2022-01-22 RX ADMIN — Medication 100 MILLIEQUIVALENT(S): at 10:06

## 2022-01-22 NOTE — PROGRESS NOTE ADULT - ASSESSMENT
47F PMHx of chronic alcoholism (0.5-1L whiskey/day, w/o alcohol w/d seizures or ICU stays), anxiety, depression presenting with nausea and vomiting (some coffee brown emesis) every 2 hours from 1/16-1/18 with decreased PO intake, associated with intermittent lower abdominal pain.

## 2022-01-22 NOTE — PROGRESS NOTE ADULT - PROBLEM SELECTOR PLAN 8
Not on home medication  - Psych consulted for capacity as patient wants to AMA  - SW needed for discharge

## 2022-01-22 NOTE — OCCUPATIONAL THERAPY INITIAL EVALUATION ADULT - ADDITIONAL COMMENTS
Per reports that she lives with her  in an apartment with elevator access and no JYOTHI, and performed all ADLs/IADLs independently prior to admission.

## 2022-01-22 NOTE — PROGRESS NOTE ADULT - SUBJECTIVE AND OBJECTIVE BOX
**INCOMPLETE NOTE    OVERNIGHT EVENTS:    SUBJECTIVE:  Patient seen and examined at bedside.    Vital Signs Last 12 Hrs  T(F): 100 (01-22-22 @ 05:19), Max: 100 (01-22-22 @ 05:19)  HR: --  BP: 92/57 (01-22-22 @ 04:08) (92/57 - 102/59)  BP(mean): 70 (01-22-22 @ 04:08) (70 - 76)  RR: --  SpO2: --  I&O's Summary    21 Jan 2022 07:01  -  22 Jan 2022 07:00  --------------------------------------------------------  IN: 766.5 mL / OUT: 1700 mL / NET: -933.5 mL        PHYSICAL EXAM:  Constitutional: NAD, comfortable in bed.  HEENT: NC/AT, PERRLA, EOMI, no conjunctival pallor or scleral icterus, MMM  Neck: Supple, no JVD  Respiratory: CTA B/L. No w/r/r.   Cardiovascular: RRR, normal S1 and S2, no m/r/g.   Gastrointestinal: +BS, soft NTND, no guarding or rebound tenderness, no palpable masses   Extremities: wwp; no cyanosis, clubbing or edema.   Vascular: Pulses equal and strong throughout.   Neurological: AAOx3, no CN deficits, strength and sensation intact throughout.   Skin: No gross skin abnormalities or rashes        LABS:                        7.4    3.09  )-----------( 137      ( 22 Jan 2022 05:58 )             23.3     01-22    138  |  100  |  8   ----------------------------<  106<H>  3.4<L>   |  30  |  0.46<L>    Ca    8.2<L>      22 Jan 2022 05:58  Phos  3.7     01-22  Mg     2.1     01-22    TPro  5.1<L>  /  Alb  3.0<L>  /  TBili  3.5<H>  /  DBili  x   /  AST  224<H>  /  ALT  137<H>  /  AlkPhos  161<H>  01-22    PT/INR - ( 22 Jan 2022 05:58 )   PT: 13.6 sec;   INR: 1.14          PTT - ( 22 Jan 2022 05:58 )  PTT:27.1 sec        RADIOLOGY & ADDITIONAL TESTS:    MEDICATIONS  (STANDING):  folic acid 1 milliGRAM(s) Oral daily  influenza   Vaccine 0.5 milliLiter(s) IntraMuscular once  lactated ringers. 1000 milliLiter(s) (500 mL/Hr) IV Continuous <Continuous>  multivitamin/minerals 1 Tablet(s) Oral daily  pantoprazole    Tablet 40 milliGRAM(s) Oral two times a day  polyethylene glycol 3350 17 Gram(s) Oral daily  sucralfate 1 Gram(s) Oral every 6 hours    MEDICATIONS  (PRN):  acetaminophen     Tablet .. 650 milliGRAM(s) Oral every 6 hours PRN Temp greater or equal to 38C (100.4F), Mild Pain (1 - 3)  benzonatate 100 milliGRAM(s) Oral every 8 hours PRN Cough  LORazepam   Injectable 2 milliGRAM(s) IV Push every 2 hours PRN CIWA-Ar score 8 or greater  trimethobenzamide Injectable 200 milliGRAM(s) IntraMuscular every 6 hours PRN Nausea and/or Vomiting   OVERNIGHT EVENTS: CIWA 0-2, refused 10 pm labs (for electrolyte abnormalities)     SUBJECTIVE:  Patient seen and examined at bedside.  "feel tension when i speak".  Denies other complaints, ROS negative    Vital Signs Last 12 Hrs  T(F): 100 (01-22-22 @ 05:19), Max: 100 (01-22-22 @ 05:19)  HR: --  BP: 92/57 (01-22-22 @ 04:08) (92/57 - 102/59)  BP(mean): 70 (01-22-22 @ 04:08) (70 - 76)  RR: --  SpO2: --  I&O's Summary    21 Jan 2022 07:01  -  22 Jan 2022 07:00  --------------------------------------------------------  IN: 766.5 mL / OUT: 1700 mL / NET: -933.5 mL    PHYSICAL EXAM:  Constitutional: NAD, comfortable in bed, CIWA0, tangential speech  HEENT: NC/AT, PERRLA, EOMI, scleral icterus, MMM  Neck: Supple, no JVD  Respiratory: CTA B/L. No w/r/r.   Cardiovascular: RRR, normal S1 and S2, no m/r/g.   Gastrointestinal: +BS, firm, NTND, no guarding or rebound tenderness, no palpable masses   Extremities: wwp; no cyanosis, clubbing or edema.   Vascular: Pulses equal and strong throughout.   Neurological: AAOx3, no CN deficits, strength and sensation intact throughout.   Skin: Diffuse ecchymoses from fall, jaundice    LABS:                        7.4    3.09  )-----------( 137      ( 22 Jan 2022 05:58 )             23.3     01-22    138  |  100  |  8   ----------------------------<  106<H>  3.4<L>   |  30  |  0.46<L>    Ca    8.2<L>      22 Jan 2022 05:58  Phos  3.7     01-22  Mg     2.1     01-22    TPro  5.1<L>  /  Alb  3.0<L>  /  TBili  3.5<H>  /  DBili  x   /  AST  224<H>  /  ALT  137<H>  /  AlkPhos  161<H>  01-22    PT/INR - ( 22 Jan 2022 05:58 )   PT: 13.6 sec;   INR: 1.14       PTT - ( 22 Jan 2022 05:58 )  PTT:27.1 sec    RADIOLOGY & ADDITIONAL TESTS:    MEDICATIONS  (STANDING):  folic acid 1 milliGRAM(s) Oral daily  influenza   Vaccine 0.5 milliLiter(s) IntraMuscular once  lactated ringers. 1000 milliLiter(s) (500 mL/Hr) IV Continuous <Continuous>  multivitamin/minerals 1 Tablet(s) Oral daily  pantoprazole    Tablet 40 milliGRAM(s) Oral two times a day  polyethylene glycol 3350 17 Gram(s) Oral daily  sucralfate 1 Gram(s) Oral every 6 hours    MEDICATIONS  (PRN):  acetaminophen     Tablet .. 650 milliGRAM(s) Oral every 6 hours PRN Temp greater or equal to 38C (100.4F), Mild Pain (1 - 3)  benzonatate 100 milliGRAM(s) Oral every 8 hours PRN Cough  LORazepam   Injectable 2 milliGRAM(s) IV Push every 2 hours PRN CIWA-Ar score 8 or greater  trimethobenzamide Injectable 200 milliGRAM(s) IntraMuscular every 6 hours PRN Nausea and/or Vomiting     ***Transfer from [facility] to [facility]***  Hospital Course:  47F PMHx of chronic alcoholism (0.5-1L whiskey/day, w/o alcohol w/d seizures or ICU stays), anxiety, depression presenting with nausea and vomiting (some coffee brown emesis) every 2 hours from 1/16-1/18 with decreased PO intake, associated with intermittent lower abdominal pain. On admission and during [facility] stay found to be persistently hypokalemic requiring aggressive repletion. Also had macrocytic anemia, transaminitis, elevated DBili, lipase & CK. Imaging revealed acute comminuted fracture posterior right iliac crest, no surgical intervention. Placed on IV Protonix and Octreotide for UGIB. EGD performed 1/19 showed erosive esophagitis. GI ppx deescalated to PO protonix & carafate. Abdomen US hepatomegaly 21.1 cm, increased echogenicity, previously liver masses not visualized on this study. Step down to [facility] for PT. Monitor electrolytes for refeeding syndrome and replete adequately. Patient attempting to leave AMA, psych consulted, determination is patient does not currently have capacity to leave AMA, reconvene with psych. CIWA 0-5, during hospital course, 0-2 overnight.    OVERNIGHT EVENTS: CIWA 0-2, refused 10 pm labs (for electrolyte abnormalities)     SUBJECTIVE:  Patient seen and examined at bedside.  "feel tension when i speak".  Denies other complaints, ROS negative    Vital Signs Last 12 Hrs  T(F): 100 (01-22-22 @ 05:19), Max: 100 (01-22-22 @ 05:19)  HR: --  BP: 92/57 (01-22-22 @ 04:08) (92/57 - 102/59)  BP(mean): 70 (01-22-22 @ 04:08) (70 - 76)  RR: --  SpO2: --  I&O's Summary    21 Jan 2022 07:01  -  22 Jan 2022 07:00  --------------------------------------------------------  IN: 766.5 mL / OUT: 1700 mL / NET: -933.5 mL    PHYSICAL EXAM:  Constitutional: NAD, comfortable in bed, CIWA0, tangential speech  HEENT: NC/AT, PERRLA, EOMI, scleral icterus, MMM  Neck: Supple, no JVD  Respiratory: CTA B/L. No w/r/r.   Cardiovascular: RRR, normal S1 and S2, no m/r/g.   Gastrointestinal: +BS, firm, NTND, no guarding or rebound tenderness, no palpable masses   Extremities: wwp; no cyanosis, clubbing or edema.   Vascular: Pulses equal and strong throughout.   Neurological: AAOx3, no CN deficits, strength and sensation intact throughout.   Skin: Diffuse ecchymoses from fall, jaundice    LABS:                        7.4    3.09  )-----------( 137      ( 22 Jan 2022 05:58 )             23.3     01-22    138  |  100  |  8   ----------------------------<  106<H>  3.4<L>   |  30  |  0.46<L>    Ca    8.2<L>      22 Jan 2022 05:58  Phos  3.7     01-22  Mg     2.1     01-22    TPro  5.1<L>  /  Alb  3.0<L>  /  TBili  3.5<H>  /  DBili  x   /  AST  224<H>  /  ALT  137<H>  /  AlkPhos  161<H>  01-22    PT/INR - ( 22 Jan 2022 05:58 )   PT: 13.6 sec;   INR: 1.14       PTT - ( 22 Jan 2022 05:58 )  PTT:27.1 sec    RADIOLOGY & ADDITIONAL TESTS:    MEDICATIONS  (STANDING):  folic acid 1 milliGRAM(s) Oral daily  influenza   Vaccine 0.5 milliLiter(s) IntraMuscular once  lactated ringers. 1000 milliLiter(s) (500 mL/Hr) IV Continuous <Continuous>  multivitamin/minerals 1 Tablet(s) Oral daily  pantoprazole    Tablet 40 milliGRAM(s) Oral two times a day  polyethylene glycol 3350 17 Gram(s) Oral daily  sucralfate 1 Gram(s) Oral every 6 hours    MEDICATIONS  (PRN):  acetaminophen     Tablet .. 650 milliGRAM(s) Oral every 6 hours PRN Temp greater or equal to 38C (100.4F), Mild Pain (1 - 3)  benzonatate 100 milliGRAM(s) Oral every 8 hours PRN Cough  LORazepam   Injectable 2 milliGRAM(s) IV Push every 2 hours PRN CIWA-Ar score 8 or greater  trimethobenzamide Injectable 200 milliGRAM(s) IntraMuscular every 6 hours PRN Nausea and/or Vomiting

## 2022-01-22 NOTE — PROGRESS NOTE ADULT - ASSESSMENT
47F PMHx of chronic alcoholism (no hx ICU adm or seizures), anxiety, depression presenting alcohol w/d with N/V of coffee ground emesis. Planned for EDG tomorrow     47F PMHx of chronic alcoholism (no hx ICU adm or seizures), anxiety, depression presenting alcohol w/d with N/V of coffee ground emesis, EGD shows erosive gastritis. Stable for stepdown to [facility]

## 2022-01-22 NOTE — OCCUPATIONAL THERAPY INITIAL EVALUATION ADULT - GENERAL OBSERVATIONS, REHAB EVAL
Pt received semi supine in bed, NAD, +heplock, +primafit. Pt A&Ox4, agreeable to OT, and tolerated session well.

## 2022-01-22 NOTE — PROGRESS NOTE ADULT - PROBLEM SELECTOR PLAN 10
F: None   E: Replete as necessary K>4 Mg>2  N: Pureed    DVT Prophylaxis: None  GI prophylaxis: PO Protonix & Carafate  CODE STATUS: FULL

## 2022-01-22 NOTE — OCCUPATIONAL THERAPY INITIAL EVALUATION ADULT - DIAGNOSIS, OT EVAL
Pt presents with impaired strength, decreased postural control, decreased activity tolerance, and decreased coordination, impacting ability to perform functional mobility/ADLs.

## 2022-01-22 NOTE — PROGRESS NOTE ADULT - SUBJECTIVE AND OBJECTIVE BOX
***Transfer from [facility] to [facility]***  Hospital Course:  47F PMHx of chronic alcoholism (0.5-1L whiskey/day, w/o alcohol w/d seizures or ICU stays), anxiety, depression presenting with nausea and vomiting (some coffee brown emesis) every 2 hours from 1/16-1/18 with decreased PO intake, associated with intermittent lower abdominal pain. On admission and during [facility] stay found to be persistently hypokalemic requiring aggressive repletion. Also had macrocytic anemia, transaminitis, elevated DBili, lipase & CK. Imaging revealed acute comminuted fracture posterior right iliac crest, no surgical intervention. Placed on IV Protonix and Octreotide for UGIB. EGD performed 1/19 showed erosive esophagitis. GI ppx deescalated to PO protonix & carafate. Abdomen US hepatomegaly 21.1 cm, increased echogenicity, previously liver masses not visualized on this study. Step down to [facility] for PT. Monitor phosphate for refeeding syndrome. Patient attempting to leave AMA, psych consulted, determinationdoes not currently have capacity to leave AMA. CIWA 0-5.    OVERNIGHT EVENTS: CIWA 0 at 9pm as asleep; LR bolus given over 2 hrs for sBPs 90s; trying to leave AMA in morning, CIWA ~5, given 2mg IV ativan for agitation, ordered 1 to 1    SUBJECTIVE: Patient seen and examined at bedside.  No complaints, ROS negative    Vital Signs Last 12 Hrs  T(F): 97.5 (01-21-22 @ 06:06), Max: 98.1 (01-20-22 @ 22:13)  HR: --  BP: 105/65 (01-21-22 @ 03:19) (92/57 - 105/65)  BP(mean): 80 (01-21-22 @ 03:19) (70 - 80)  RR: 18 (01-21-22 @ 03:19) (18 - 18)  SpO2: 95% (01-21-22 @ 03:19) (94% - 100%)  I&O's Summary    20 Jan 2022 07:01  -  21 Jan 2022 07:00  --------------------------------------------------------  IN: 1105 mL / OUT: 2450 mL / NET: -1345 mL    PHYSICAL EXAM:  Constitutional: NAD, comfortable in bed, CIWA0, tangential speech  HEENT: NC/AT, PERRLA, EOMI, scleral icterus, MMM  Neck: Supple, no JVD  Respiratory: CTA B/L. No w/r/r.   Cardiovascular: RRR, normal S1 and S2, no m/r/g.   Gastrointestinal: +BS, firm, NTND, no guarding or rebound tenderness, no palpable masses   Extremities: wwp; no cyanosis, clubbing or edema.   Vascular: Pulses equal and strong throughout.   Neurological: AAOx3, no CN deficits, strength and sensation intact throughout.   Skin: Diffuse ecchymoses from fall, jaundice    LABS:                        8.2    4.74  )-----------( 66       ( 20 Jan 2022 06:36 )             25.0     01-20    135  |  93<L>  |  7   ----------------------------<  108<H>  3.3<L>   |  27  |  0.53    Ca    8.1<L>      20 Jan 2022 06:36  Phos  1.1     01-20  Mg     2.0     01-20    TPro  5.6<L>  /  Alb  3.5  /  TBili  4.0<H>  /  DBili  x   /  AST  228<H>  /  ALT  116<H>  /  AlkPhos  93  01-20    PT/INR - ( 20 Jan 2022 06:36 )   PT: 14.4 sec;   INR: 1.21        PTT - ( 20 Jan 2022 06:36 )  PTT:26.3 sec    RADIOLOGY & ADDITIONAL TESTS:    MEDICATIONS  (STANDING):  chlordiazePOXIDE 50 milliGRAM(s) Oral every 8 hours  folic acid 1 milliGRAM(s) Oral daily  influenza   Vaccine 0.5 milliLiter(s) IntraMuscular once  lactated ringers. 1000 milliLiter(s) (500 mL/Hr) IV Continuous <Continuous>  magnesium sulfate  IVPB 2 Gram(s) IV Intermittent once  multivitamin/minerals 1 Tablet(s) Oral daily  pantoprazole    Tablet 40 milliGRAM(s) Oral two times a day  sucralfate 1 Gram(s) Oral every 6 hours  thiamine IVPB 500 milliGRAM(s) IV Intermittent every 8 hours    MEDICATIONS  (PRN):  acetaminophen     Tablet .. 650 milliGRAM(s) Oral every 6 hours PRN Temp greater or equal to 38C (100.4F), Mild Pain (1 - 3)  benzonatate 100 milliGRAM(s) Oral every 8 hours PRN Cough  LORazepam   Injectable 2 milliGRAM(s) IV Push every 2 hours PRN CIWA-Ar score 8 or greater  trimethobenzamide Injectable 200 milliGRAM(s) IntraMuscular every 6 hours PRN Nausea and/or Vomiting

## 2022-01-23 DIAGNOSIS — K22.10 ULCER OF ESOPHAGUS WITHOUT BLEEDING: ICD-10-CM

## 2022-01-23 DIAGNOSIS — R94.31 ABNORMAL ELECTROCARDIOGRAM [ECG] [EKG]: ICD-10-CM

## 2022-01-23 LAB
ALBUMIN SERPL ELPH-MCNC: 3.4 G/DL — SIGNIFICANT CHANGE UP (ref 3.3–5)
ALP SERPL-CCNC: 171 U/L — HIGH (ref 40–120)
ALT FLD-CCNC: 134 U/L — HIGH (ref 10–45)
ANION GAP SERPL CALC-SCNC: 12 MMOL/L — SIGNIFICANT CHANGE UP (ref 5–17)
ANISOCYTOSIS BLD QL: SLIGHT — SIGNIFICANT CHANGE UP
AST SERPL-CCNC: 144 U/L — HIGH (ref 10–40)
BASOPHILS # BLD AUTO: 0 K/UL — SIGNIFICANT CHANGE UP (ref 0–0.2)
BASOPHILS NFR BLD AUTO: 0 % — SIGNIFICANT CHANGE UP (ref 0–2)
BILIRUB SERPL-MCNC: 3.3 MG/DL — HIGH (ref 0.2–1.2)
BUN SERPL-MCNC: 6 MG/DL — LOW (ref 7–23)
CALCIUM SERPL-MCNC: 8.5 MG/DL — SIGNIFICANT CHANGE UP (ref 8.4–10.5)
CHLORIDE SERPL-SCNC: 103 MMOL/L — SIGNIFICANT CHANGE UP (ref 96–108)
CO2 SERPL-SCNC: 24 MMOL/L — SIGNIFICANT CHANGE UP (ref 22–31)
CREAT SERPL-MCNC: 0.56 MG/DL — SIGNIFICANT CHANGE UP (ref 0.5–1.3)
CULTURE RESULTS: SIGNIFICANT CHANGE UP
CULTURE RESULTS: SIGNIFICANT CHANGE UP
EOSINOPHIL # BLD AUTO: 0.03 K/UL — SIGNIFICANT CHANGE UP (ref 0–0.5)
EOSINOPHIL NFR BLD AUTO: 0.9 % — SIGNIFICANT CHANGE UP (ref 0–6)
GIANT PLATELETS BLD QL SMEAR: PRESENT — SIGNIFICANT CHANGE UP
GLUCOSE SERPL-MCNC: 129 MG/DL — HIGH (ref 70–99)
HCT VFR BLD CALC: 24 % — LOW (ref 34.5–45)
HGB BLD-MCNC: 7.5 G/DL — LOW (ref 11.5–15.5)
HYPOCHROMIA BLD QL: SIGNIFICANT CHANGE UP
LYMPHOCYTES # BLD AUTO: 0.76 K/UL — LOW (ref 1–3.3)
LYMPHOCYTES # BLD AUTO: 21.3 % — SIGNIFICANT CHANGE UP (ref 13–44)
MACROCYTES BLD QL: SLIGHT — SIGNIFICANT CHANGE UP
MAGNESIUM SERPL-MCNC: 1.7 MG/DL — SIGNIFICANT CHANGE UP (ref 1.6–2.6)
MANUAL SMEAR VERIFICATION: SIGNIFICANT CHANGE UP
MCHC RBC-ENTMCNC: 31.3 GM/DL — LOW (ref 32–36)
MCHC RBC-ENTMCNC: 33.5 PG — SIGNIFICANT CHANGE UP (ref 27–34)
MCV RBC AUTO: 107.1 FL — HIGH (ref 80–100)
MONOCYTES # BLD AUTO: 0.75 K/UL — SIGNIFICANT CHANGE UP (ref 0–0.9)
MONOCYTES NFR BLD AUTO: 21.2 % — HIGH (ref 2–14)
NEUTROPHILS # BLD AUTO: 2.01 K/UL — SIGNIFICANT CHANGE UP (ref 1.8–7.4)
NEUTROPHILS NFR BLD AUTO: 56.6 % — SIGNIFICANT CHANGE UP (ref 43–77)
OVALOCYTES BLD QL SMEAR: SLIGHT — SIGNIFICANT CHANGE UP
PHOSPHATE SERPL-MCNC: 2.6 MG/DL — SIGNIFICANT CHANGE UP (ref 2.5–4.5)
PLAT MORPH BLD: ABNORMAL
PLATELET # BLD AUTO: 201 K/UL — SIGNIFICANT CHANGE UP (ref 150–400)
POIKILOCYTOSIS BLD QL AUTO: SLIGHT — SIGNIFICANT CHANGE UP
POLYCHROMASIA BLD QL SMEAR: SLIGHT — SIGNIFICANT CHANGE UP
POTASSIUM SERPL-MCNC: 4.2 MMOL/L — SIGNIFICANT CHANGE UP (ref 3.5–5.3)
POTASSIUM SERPL-SCNC: 4.2 MMOL/L — SIGNIFICANT CHANGE UP (ref 3.5–5.3)
PROT SERPL-MCNC: 5.8 G/DL — LOW (ref 6–8.3)
RBC # BLD: 2.24 M/UL — LOW (ref 3.8–5.2)
RBC # FLD: 17.1 % — HIGH (ref 10.3–14.5)
RBC BLD AUTO: ABNORMAL
SODIUM SERPL-SCNC: 139 MMOL/L — SIGNIFICANT CHANGE UP (ref 135–145)
SPECIMEN SOURCE: SIGNIFICANT CHANGE UP
SPECIMEN SOURCE: SIGNIFICANT CHANGE UP
STOMATOCYTES BLD QL SMEAR: SIGNIFICANT CHANGE UP
WBC # BLD: 3.55 K/UL — LOW (ref 3.8–10.5)
WBC # FLD AUTO: 3.55 K/UL — LOW (ref 3.8–10.5)

## 2022-01-23 PROCEDURE — 99233 SBSQ HOSP IP/OBS HIGH 50: CPT | Mod: GC

## 2022-01-23 RX ORDER — ACETAMINOPHEN 500 MG
650 TABLET ORAL EVERY 8 HOURS
Refills: 0 | Status: DISCONTINUED | OUTPATIENT
Start: 2022-01-23 | End: 2022-01-26

## 2022-01-23 RX ORDER — THIAMINE MONONITRATE (VIT B1) 100 MG
100 TABLET ORAL DAILY
Refills: 0 | Status: DISCONTINUED | OUTPATIENT
Start: 2022-01-23 | End: 2022-01-26

## 2022-01-23 RX ORDER — MAGNESIUM SULFATE 500 MG/ML
2 VIAL (ML) INJECTION ONCE
Refills: 0 | Status: COMPLETED | OUTPATIENT
Start: 2022-01-23 | End: 2022-01-23

## 2022-01-23 RX ORDER — POTASSIUM PHOSPHATE, MONOBASIC POTASSIUM PHOSPHATE, DIBASIC 236; 224 MG/ML; MG/ML
15 INJECTION, SOLUTION INTRAVENOUS ONCE
Refills: 0 | Status: DISCONTINUED | OUTPATIENT
Start: 2022-01-23 | End: 2022-01-23

## 2022-01-23 RX ADMIN — Medication 25 GRAM(S): at 13:58

## 2022-01-23 RX ADMIN — Medication 1 GRAM(S): at 19:04

## 2022-01-23 RX ADMIN — Medication 1 GRAM(S): at 13:58

## 2022-01-23 RX ADMIN — Medication 650 MILLIGRAM(S): at 06:11

## 2022-01-23 RX ADMIN — PANTOPRAZOLE SODIUM 40 MILLIGRAM(S): 20 TABLET, DELAYED RELEASE ORAL at 06:47

## 2022-01-23 RX ADMIN — Medication 1 TABLET(S): at 13:58

## 2022-01-23 RX ADMIN — Medication 1 MILLIGRAM(S): at 13:58

## 2022-01-23 RX ADMIN — PANTOPRAZOLE SODIUM 40 MILLIGRAM(S): 20 TABLET, DELAYED RELEASE ORAL at 19:04

## 2022-01-23 RX ADMIN — Medication 1 GRAM(S): at 06:12

## 2022-01-23 NOTE — PROGRESS NOTE ADULT - PROBLEM SELECTOR PLAN 8
Acute GI blood loss anemia + nutritional deficiencies. EGD: Erosive esophagitis. B12 elevated, low folate.  - Maintain active T&S, large bore IV access, transfuse hgb<7  - Folate deficient, c/w folate 1mg qd, MV, thiamine

## 2022-01-23 NOTE — PROGRESS NOTE ADULT - ASSESSMENT
47F PMHx of chronic alcoholism (no hx ICU adm or seizures), anxiety, depression presenting alcohol w/d with N/V of coffee ground emesis, EGD shows erosive gastritis, stepped down to [facility]

## 2022-01-23 NOTE — PROGRESS NOTE ADULT - PROBLEM SELECTOR PLAN 9
Not on home medication  - Psych consulted for capacity as patient wants to AMA, currently lacks capacity  - F/u psych recs regarding capacity  - SW needed for discharge

## 2022-01-23 NOTE — PROGRESS NOTE ADULT - SUBJECTIVE AND OBJECTIVE BOX
OVERNIGHT EVENTS: No acute events overnight.     SUBJECTIVE / INTERVAL HPI: Patient seen and examined at bedside. Pt reports continued sternal pain as well as new back pain. Reports worsening w/ movement and palpation. Denies shortness of breath, abd pain.    VITAL SIGNS:  Vital Signs Last 24 Hrs  T(C): 36.4 (23 Jan 2022 05:47), Max: 37.1 (22 Jan 2022 14:37)  T(F): 97.6 (23 Jan 2022 05:47), Max: 98.8 (22 Jan 2022 14:37)  HR: 84 (23 Jan 2022 05:47) (84 - 102)  BP: 103/67 (23 Jan 2022 05:47) (94/61 - 108/69)  BP(mean): 78 (22 Jan 2022 15:50) (78 - 78)  RR: 18 (23 Jan 2022 05:47) (17 - 18)  SpO2: 97% (23 Jan 2022 05:47) (95% - 98%)    PHYSICAL EXAM:  Constitutional: NAD, comfortable in bed, CIWA0, tangential speech  HEENT: NC/AT, PERRLA, EOMI, scleral icterus, MMM  Neck: Supple, no JVD  Respiratory: CTA B/L. No w/r/r.   Cardiovascular: RRR, normal S1 and S2, no m/r/g.   Gastrointestinal: +BS, firm, NTND, no guarding or rebound tenderness, no palpable masses   Extremities: wwp; no cyanosis, clubbing or edema.   Vascular: Pulses equal and strong throughout.   Neurological: AAOx3, no CN deficits, strength and sensation intact throughout.   Skin: Diffuse ecchymoses from fall    MEDICATIONS:  MEDICATIONS  (STANDING):  folic acid 1 milliGRAM(s) Oral daily  influenza   Vaccine 0.5 milliLiter(s) IntraMuscular once  multivitamin/minerals 1 Tablet(s) Oral daily  pantoprazole    Tablet 40 milliGRAM(s) Oral two times a day  polyethylene glycol 3350 17 Gram(s) Oral daily  sucralfate 1 Gram(s) Oral every 6 hours    MEDICATIONS  (PRN):  acetaminophen     Tablet .. 650 milliGRAM(s) Oral every 8 hours PRN Temp greater or equal to 38C (100.4F), Mild Pain (1 - 3)  benzonatate 100 milliGRAM(s) Oral every 8 hours PRN Cough  LORazepam   Injectable 2 milliGRAM(s) IV Push every 2 hours PRN CIWA-Ar score 8 or greater  trimethobenzamide Injectable 200 milliGRAM(s) IntraMuscular every 6 hours PRN Nausea and/or Vomiting      ALLERGIES:  Allergies    No Known Allergies    Intolerances        LABS:                        7.5    3.55  )-----------( 201      ( 23 Jan 2022 12:43 )             24.0     01-23    139  |  103  |  6<L>  ----------------------------<  129<H>  4.2   |  24  |  0.56    Ca    8.5      23 Jan 2022 12:43  Phos  2.6     01-23  Mg     1.7     01-23    TPro  5.8<L>  /  Alb  3.4  /  TBili  3.3<H>  /  DBili  x   /  AST  144<H>  /  ALT  134<H>  /  AlkPhos  171<H>  01-23    PT/INR - ( 22 Jan 2022 05:58 )   PT: 13.6 sec;   INR: 1.14          PTT - ( 22 Jan 2022 05:58 )  PTT:27.1 sec    CAPILLARY BLOOD GLUCOSE          RADIOLOGY & ADDITIONAL TESTS: Reviewed.

## 2022-01-23 NOTE — PROGRESS NOTE ADULT - PROBLEM SELECTOR PLAN 3
RESOLVED. QTc 565 on admission, however resolved to 460s on 1/23.  - Avoid QT prolonging medications  - Continue to monitor EKG qd

## 2022-01-24 ENCOUNTER — TRANSCRIPTION ENCOUNTER (OUTPATIENT)
Age: 47
End: 2022-01-24

## 2022-01-24 LAB
ALBUMIN SERPL ELPH-MCNC: 3.3 G/DL — SIGNIFICANT CHANGE UP (ref 3.3–5)
ALP SERPL-CCNC: 167 U/L — HIGH (ref 40–120)
ALT FLD-CCNC: 116 U/L — HIGH (ref 10–45)
ANION GAP SERPL CALC-SCNC: 12 MMOL/L — SIGNIFICANT CHANGE UP (ref 5–17)
ANISOCYTOSIS BLD QL: SIGNIFICANT CHANGE UP
AST SERPL-CCNC: 109 U/L — HIGH (ref 10–40)
BASOPHILS # BLD AUTO: 0.13 K/UL — SIGNIFICANT CHANGE UP (ref 0–0.2)
BASOPHILS NFR BLD AUTO: 2.7 % — HIGH (ref 0–2)
BILIRUB SERPL-MCNC: 3 MG/DL — HIGH (ref 0.2–1.2)
BUN SERPL-MCNC: 5 MG/DL — LOW (ref 7–23)
CALCIUM SERPL-MCNC: 8.5 MG/DL — SIGNIFICANT CHANGE UP (ref 8.4–10.5)
CHLORIDE SERPL-SCNC: 101 MMOL/L — SIGNIFICANT CHANGE UP (ref 96–108)
CO2 SERPL-SCNC: 23 MMOL/L — SIGNIFICANT CHANGE UP (ref 22–31)
CREAT SERPL-MCNC: 0.6 MG/DL — SIGNIFICANT CHANGE UP (ref 0.5–1.3)
EOSINOPHIL # BLD AUTO: 0 K/UL — SIGNIFICANT CHANGE UP (ref 0–0.5)
EOSINOPHIL NFR BLD AUTO: 0 % — SIGNIFICANT CHANGE UP (ref 0–6)
GIANT PLATELETS BLD QL SMEAR: PRESENT — SIGNIFICANT CHANGE UP
GLUCOSE SERPL-MCNC: 104 MG/DL — HIGH (ref 70–99)
HAV IGM SER-ACNC: SIGNIFICANT CHANGE UP
HBV CORE IGM SER-ACNC: SIGNIFICANT CHANGE UP
HBV SURFACE AG SER-ACNC: SIGNIFICANT CHANGE UP
HCT VFR BLD CALC: 23.1 % — LOW (ref 34.5–45)
HCV AB S/CO SERPL IA: 0.04 S/CO — SIGNIFICANT CHANGE UP
HCV AB SERPL-IMP: SIGNIFICANT CHANGE UP
HGB BLD-MCNC: 7.4 G/DL — LOW (ref 11.5–15.5)
HYPOCHROMIA BLD QL: SLIGHT — SIGNIFICANT CHANGE UP
LYMPHOCYTES # BLD AUTO: 1.06 K/UL — SIGNIFICANT CHANGE UP (ref 1–3.3)
LYMPHOCYTES # BLD AUTO: 21.6 % — SIGNIFICANT CHANGE UP (ref 13–44)
MACROCYTES BLD QL: SIGNIFICANT CHANGE UP
MAGNESIUM SERPL-MCNC: 2 MG/DL — SIGNIFICANT CHANGE UP (ref 1.6–2.6)
MANUAL SMEAR VERIFICATION: SIGNIFICANT CHANGE UP
MCHC RBC-ENTMCNC: 32 GM/DL — SIGNIFICANT CHANGE UP (ref 32–36)
MCHC RBC-ENTMCNC: 34.3 PG — HIGH (ref 27–34)
MCV RBC AUTO: 106.9 FL — HIGH (ref 80–100)
MONOCYTES # BLD AUTO: 0.62 K/UL — SIGNIFICANT CHANGE UP (ref 0–0.9)
MONOCYTES NFR BLD AUTO: 12.6 % — SIGNIFICANT CHANGE UP (ref 2–14)
NEUTROPHILS # BLD AUTO: 3.09 K/UL — SIGNIFICANT CHANGE UP (ref 1.8–7.4)
NEUTROPHILS NFR BLD AUTO: 63.1 % — SIGNIFICANT CHANGE UP (ref 43–77)
PHOSPHATE SERPL-MCNC: 2.4 MG/DL — LOW (ref 2.5–4.5)
PLAT MORPH BLD: NORMAL — SIGNIFICANT CHANGE UP
PLATELET # BLD AUTO: 271 K/UL — SIGNIFICANT CHANGE UP (ref 150–400)
POLYCHROMASIA BLD QL SMEAR: SLIGHT — SIGNIFICANT CHANGE UP
POTASSIUM SERPL-MCNC: 3.5 MMOL/L — SIGNIFICANT CHANGE UP (ref 3.5–5.3)
POTASSIUM SERPL-SCNC: 3.5 MMOL/L — SIGNIFICANT CHANGE UP (ref 3.5–5.3)
PROT SERPL-MCNC: 5.6 G/DL — LOW (ref 6–8.3)
RBC # BLD: 2.16 M/UL — LOW (ref 3.8–5.2)
RBC # FLD: 17.2 % — HIGH (ref 10.3–14.5)
RBC BLD AUTO: ABNORMAL
SMUDGE CELLS # BLD: PRESENT — SIGNIFICANT CHANGE UP
SODIUM SERPL-SCNC: 136 MMOL/L — SIGNIFICANT CHANGE UP (ref 135–145)
STOMATOCYTES BLD QL SMEAR: SIGNIFICANT CHANGE UP
WBC # BLD: 4.9 K/UL — SIGNIFICANT CHANGE UP (ref 3.8–10.5)
WBC # FLD AUTO: 4.9 K/UL — SIGNIFICANT CHANGE UP (ref 3.8–10.5)

## 2022-01-24 PROCEDURE — 71048 X-RAY EXAM CHEST 4+ VIEWS: CPT | Mod: 26

## 2022-01-24 PROCEDURE — 99238 HOSP IP/OBS DSCHRG MGMT 30/<: CPT

## 2022-01-24 RX ORDER — THIAMINE MONONITRATE (VIT B1) 100 MG
1 TABLET ORAL
Qty: 30 | Refills: 0
Start: 2022-01-24 | End: 2022-02-22

## 2022-01-24 RX ORDER — MULTIVIT-MIN/FERROUS GLUCONATE 9 MG/15 ML
1 LIQUID (ML) ORAL
Qty: 30 | Refills: 0
Start: 2022-01-24 | End: 2022-02-22

## 2022-01-24 RX ORDER — LIDOCAINE 4 G/100G
1 CREAM TOPICAL
Qty: 14 | Refills: 0
Start: 2022-01-24 | End: 2022-02-06

## 2022-01-24 RX ORDER — POTASSIUM CHLORIDE 20 MEQ
40 PACKET (EA) ORAL ONCE
Refills: 0 | Status: COMPLETED | OUTPATIENT
Start: 2022-01-24 | End: 2022-01-24

## 2022-01-24 RX ORDER — PANTOPRAZOLE SODIUM 20 MG/1
40 TABLET, DELAYED RELEASE ORAL
Refills: 0 | Status: DISCONTINUED | OUTPATIENT
Start: 2022-01-25 | End: 2022-01-26

## 2022-01-24 RX ORDER — PANTOPRAZOLE SODIUM 20 MG/1
1 TABLET, DELAYED RELEASE ORAL
Qty: 30 | Refills: 0
Start: 2022-01-24 | End: 2022-02-22

## 2022-01-24 RX ORDER — PANTOPRAZOLE SODIUM 20 MG/1
1 TABLET, DELAYED RELEASE ORAL
Qty: 60 | Refills: 0
Start: 2022-01-24 | End: 2022-02-22

## 2022-01-24 RX ORDER — LIDOCAINE 4 G/100G
1 CREAM TOPICAL EVERY 24 HOURS
Refills: 0 | Status: DISCONTINUED | OUTPATIENT
Start: 2022-01-24 | End: 2022-01-26

## 2022-01-24 RX ORDER — FOLIC ACID 0.8 MG
1 TABLET ORAL
Qty: 30 | Refills: 0
Start: 2022-01-24 | End: 2022-02-22

## 2022-01-24 RX ORDER — SUCRALFATE 1 G
1 TABLET ORAL
Qty: 120 | Refills: 0
Start: 2022-01-24 | End: 2022-02-22

## 2022-01-24 RX ADMIN — Medication 1 MILLIGRAM(S): at 11:32

## 2022-01-24 RX ADMIN — PANTOPRAZOLE SODIUM 40 MILLIGRAM(S): 20 TABLET, DELAYED RELEASE ORAL at 07:44

## 2022-01-24 RX ADMIN — Medication 40 MILLIEQUIVALENT(S): at 07:53

## 2022-01-24 RX ADMIN — Medication 1 GRAM(S): at 11:32

## 2022-01-24 RX ADMIN — Medication 100 MILLIGRAM(S): at 11:32

## 2022-01-24 RX ADMIN — LIDOCAINE 1 PATCH: 4 CREAM TOPICAL at 18:48

## 2022-01-24 RX ADMIN — Medication 1 GRAM(S): at 00:14

## 2022-01-24 RX ADMIN — LIDOCAINE 1 PATCH: 4 CREAM TOPICAL at 11:33

## 2022-01-24 RX ADMIN — Medication 1 GRAM(S): at 18:12

## 2022-01-24 RX ADMIN — Medication 1 TABLET(S): at 11:32

## 2022-01-24 RX ADMIN — Medication 1 GRAM(S): at 05:56

## 2022-01-24 NOTE — PROGRESS NOTE ADULT - SUBJECTIVE AND OBJECTIVE BOX
OVERNIGHT EVENTS: No acute events overnight.     SUBJECTIVE / INTERVAL HPI: Patient seen and examined at bedside. Pt reports that her balance and walking is improving. Pt reports having some diarrhea following her Carafate overnight. Reports watery diarrhea, denies any blood. Denies any nausea. Reports mildly improved sternal pain.    VITAL SIGNS:  Vital Signs Last 24 Hrs  T(C): 36.3 (24 Jan 2022 16:24), Max: 36.8 (23 Jan 2022 21:13)  T(F): 97.4 (24 Jan 2022 16:24), Max: 98.3 (24 Jan 2022 05:12)  HR: 102 (24 Jan 2022 16:24) (86 - 102)  BP: 92/66 (24 Jan 2022 16:24) (92/66 - 97/60)  BP(mean): --  RR: 18 (24 Jan 2022 16:24) (18 - 18)  SpO2: 100% (24 Jan 2022 16:24) (97% - 100%)    PHYSICAL EXAM:  Constitutional: NAD, comfortable in bed, CIWA0  HEENT: NC/AT, PERRLA, EOMI, scleral icterus, MMM  Neck: Supple, no JVD  Respiratory: CTA B/L. No w/r/r.   Cardiovascular: RRR, normal S1 and S2, no m/r/g.   Gastrointestinal: +BS, firm, NTND, no guarding or rebound tenderness, no palpable masses   Extremities: wwp; no cyanosis, clubbing or edema.   Vascular: Pulses equal and strong throughout.   Neurological: AAOx3, no CN deficits, strength and sensation intact throughout.   Skin: Diffuse ecchymoses from fall    MEDICATIONS:  MEDICATIONS  (STANDING):  folic acid 1 milliGRAM(s) Oral daily  influenza   Vaccine 0.5 milliLiter(s) IntraMuscular once  lidocaine   4% Patch 1 Patch Transdermal every 24 hours  multivitamin/minerals 1 Tablet(s) Oral daily  polyethylene glycol 3350 17 Gram(s) Oral daily  sucralfate 1 Gram(s) Oral every 6 hours  thiamine 100 milliGRAM(s) Oral daily    MEDICATIONS  (PRN):  acetaminophen     Tablet .. 650 milliGRAM(s) Oral every 8 hours PRN Temp greater or equal to 38C (100.4F), Mild Pain (1 - 3)  benzonatate 100 milliGRAM(s) Oral every 8 hours PRN Cough  LORazepam   Injectable 2 milliGRAM(s) IV Push every 2 hours PRN CIWA-Ar score 8 or greater  trimethobenzamide Injectable 200 milliGRAM(s) IntraMuscular every 6 hours PRN Nausea and/or Vomiting      ALLERGIES:  Allergies    No Known Allergies    Intolerances        LABS:                        7.4    4.90  )-----------( 271      ( 24 Jan 2022 06:58 )             23.1     01-24    136  |  101  |  5<L>  ----------------------------<  104<H>  3.5   |  23  |  0.60    Ca    8.5      24 Jan 2022 06:58  Phos  2.4     01-24  Mg     2.0     01-24    TPro  5.6<L>  /  Alb  3.3  /  TBili  3.0<H>  /  DBili  x   /  AST  109<H>  /  ALT  116<H>  /  AlkPhos  167<H>  01-24        CAPILLARY BLOOD GLUCOSE          RADIOLOGY & ADDITIONAL TESTS: Reviewed.

## 2022-01-24 NOTE — DISCHARGE NOTE PROVIDER - NSDCCPTREATMENT_GEN_ALL_CORE_FT
PRINCIPAL PROCEDURE  Procedure: CT abdomen without contrast  Findings and Treatment: FINDINGS:  Limited evaluation of solid organs without intravenous contrast.  Lungs/Pleura/Airways: Mild dependent atelectasis bilaterally. No focal consolidation. Central airways are patent. No effusion orpneumothorax.  Mediastinum: Within normal limits. No pericardial effusion.  Chest wall/Lower neck: Within normal limits.  Liver: Hepatomegaly. Diffusely hypoattenuating parenchyma.  Gallbladder: No calcified stones. Nondilated. No pericholecystic fluid. Hyperdense intraluminal contents probably represent sludge.  Spleen: Within normal limits.  Pancreas: Fatty replacement head.  Adrenal glands: Within normal limits.  Kidneys: Right renal cortical cyst. No urolithiasis or hydronephrosis.  Lymph nodes: Within normal limits.  Ascites: No ascites.  GI tract and peritoneum: Rectal tube in situ. Gas-filled distended small bowel loops without high-grade transition point to suggest obstruction. No inflammatory perienteric fat stranding. No extraluminal gas. Normal appendix.  Vasculature: Within normal limits.  Pelvic organs: Bladder is decompressed over a urinary catheter. Uterus and bilateral adnexa are within normal limits.  Soft tissues: Within normal limits.  Bones: Acute comminuted fracture posterior right iliac crest. No displaced rib fracture. No thoracolumbar spine compression fracture or traumatic malalignment.  IMPRESSION:  1.  Acute comminuted fracture posterior right iliac crest. No acute visceral organ injury.  2.  Gas-filled mildly distended small bowel loops suggesting ileus.  3.  Marked hepatomegaly and steatosis.        SECONDARY PROCEDURE  Procedure: EGD  Findings and Treatment: Macrocytic anemia - Likely component of acute GI blood loss anemia coupled with nutritional deficiencies. Has had similar episodes in the past though cannot recall last upper endoscopy. Presented with Hgb of 11.5 but has now decreased to 6.8, likely reflective of hydration given for hypothermia and hypotension. Repeat without transfusion of 9.2, more consistent with patient's baseline, though still lower than previous. Etiology presumably MW tear vs erosive esophagitis or gastritis, less likely varices as no clear evidence of cirrhosis, but may be masked by element of alc hep. Prior and current imaging reviewed. No ascites, but hepatomegaly with normal spleen size. Patient s/p EGD on 1/19 with Grade D esophagitis without active bleeding, gastritis.  - Thiamine, Folate, MVT supplementation  - Continue PPI 40mg PO BID and Carafate suspension 1g Q6H for 8 weeks  - Patient should follow-up for repeat endoscopy in 8 weeks to assess for mucosal healing  - Maintain active T&S, large bore IV access  - Transfusion threshold per primary team  - Advance diet as tolerated     Elevated LTs - In the setting of active alcohol use. Question of underlying steatosis vs cirrhosis, but no clear evidence of decompensation (no ascites, no known history of variceal hemorrhage, no documented hepatic encephalopathy, no splenomegaly on CT). Current elevation with AST>ALT, elevated bili, and coagulopathy. With hepatomegaly on CT constellation of findings suggestive of alcoholic hepatitis.   - Maddrey Discriminant Function score of 15 on admission suggestive of good prognosis, improving  - INR and CMP daily  - SW for EtOH cessation  - Nutrition evaluation  - CIWA protocol per primary medical team  - Counseled on alcohol cessation and likelihood of progression to chronic liver disease or death if she does not stop  - Given hypophosphatemia, would monitor closely for development of refeeding syndrome

## 2022-01-24 NOTE — DISCHARGE NOTE PROVIDER - CARE PROVIDER_API CALL
Sherman Villasenor (MD)  Gastroenterology; Internal Medicine  [address] [place] [address]  Phone: [phone]  Fax: [phone]  Scheduled Appointment: 04/25/2022 01:30 PM

## 2022-01-24 NOTE — DISCHARGE NOTE PROVIDER - NSDCCPCAREPLAN_GEN_ALL_CORE_FT
PRINCIPAL DISCHARGE DIAGNOSIS  Diagnosis: Erosive esophagitis  Assessment and Plan of Treatment: You were diagnosed with esophagitis and gastritis, which are inflammations of the esophagus (the tube running from the throat to the stomach) and the stomach itself, respectively. The cause of your esophagitis was from stomach acids being vomited back up into the tube. The cause of your gastritis is likely from excess alcohol consumption. We have provided you with medications to help with both of these issues. Carafate will help produce a protective lining on your stomach and your throat, preventing further injury. In addition, we are prescribing you a medication called Protonix, which will decrease the production of acid in the stomach, which will also decrease future damage. Inflammation over a long period of time can cause damage to the throat and the stomach, which can cause them to weaken. If enough damage is done, they can rupture, which is life-threatening. The inflammation also increases the risk of developing cancers such as squamous cell cancer of the throat and adenocarcinoma of the stomach. Please take your medications as prescribed and follow up with your GI and primary care doctors.      SECONDARY DISCHARGE DIAGNOSES  Diagnosis: Closed fracture of right iliac crest  Assessment and Plan of Treatment: You were noted to have a small comminuted fracture of the posterior portion of your pelvis. This was most likely due to your fall. A fracture of this nature should heal on its own over time. The fracture appears stable and you are able to walk. Please follow up with your primary care physician. If you have warmth or pain of your right pelvic region, or if you are concerned about this fracture, please make an appointment with an orthopedic surgeon and show them this paperwork.    Diagnosis: Alcohol withdrawal  Assessment and Plan of Treatment: You have a history of alcohol dependence or alcoholism. With alcohol dependence, you drink alcohol too much and too often for a longer period of time. Dependence symptoms include needing more alcohol to get the same effects, having symptoms of withdrawal if you stop drinking, and may be unable to control your drinking. Alcohol abuse is dangerous to your liver and you may experience alcohol withdrawal if you stop drinking suddenly. Common symptoms of alcohol withdrawal include shaking, throwing up, sweating, and anxiety. Please seek care immediately or call 911 if you have a convulsion, trouble breathing, chest pain, fast heartbeat, and feel like hurting yourself or someone else. Please continue to take your multivitamin tablet, your thiamine (vitamin B1) tablet, and your folic acid (vitamin B9) tablets and follow up with your primary care provider.     We have provided you the following recommendations for outpatient substance abuse treatment:     [employer] [facility] [employer] [place]   Main: [phone]   FAX: [phone]   Site: www[facility].org     [facility]: [facility]   [address] [place], [place] [id number]  [phone]    Diagnosis: Chest pain  Assessment and Plan of Treatment: Your chest and back pain are most likely related to receiving CPR by EMS prior to being taken to the emergency room. Your CT and Xrays have ruled out a rib fracture. The pain should improve on its own. We recommend that you use lidocaine patches as needed for pain. We recommend that you do NOT use any Tylenol (acetaminophen) or NSAIDS such as Motrin, Ibuprofen, or Advil. Tylenol is not suggested due to your liver damage. NSAIDs are not recommended because of the damage they can cause to your GI tract.     PRINCIPAL DISCHARGE DIAGNOSIS  Diagnosis: Erosive esophagitis  Assessment and Plan of Treatment: You were diagnosed with esophagitis and gastritis, which are inflammations of the esophagus (the tube running from the throat to the stomach) and the stomach itself, respectively. The cause of your esophagitis was from stomach acids being vomited back up into the tube. The cause of your gastritis is likely from excess alcohol consumption. We have provided you with medications to help with both of these issues. Carafate will help produce a protective lining on your stomach and your throat, preventing further injury. In addition, we are prescribing you a medication called Protonix, which will decrease the production of acid in the stomach, which will also decrease future damage. Inflammation over a long period of time can cause damage to the throat and the stomach, which can cause them to weaken. If enough damage is done, they can rupture, which is life-threatening. The inflammation also increases the risk of developing cancers such as squamous cell cancer of the throat and adenocarcinoma of the stomach. Please take your medications as prescribed and follow up with your GI and primary care doctors.     We have made an appointment for you, but the earliest appointment available was in April. We would like you to see Dr. Villasenor within 2 months. You are to receive a repeat endoscopy in 8 weeks. Until then, please take the Carafate every 6 hours and the Protonix every 12 hours for 8 weeks. We have prescribed a one month supply. You may speak with a primary care physician to receive refills.      SECONDARY DISCHARGE DIAGNOSES  Diagnosis: Closed fracture of right iliac crest  Assessment and Plan of Treatment: You were noted to have a small comminuted fracture of the posterior portion of your pelvis. This was most likely due to your fall. A fracture of this nature should heal on its own over time. The fracture appears stable and you are able to walk. Please follow up with your primary care physician. If you have warmth or pain of your right pelvic region, or if you are concerned about this fracture, please make an appointment with an orthopedic surgeon and show them this paperwork.    Diagnosis: Alcohol withdrawal  Assessment and Plan of Treatment: You have a history of alcohol dependence or alcoholism. With alcohol dependence, you drink alcohol too much and too often for a longer period of time. Dependence symptoms include needing more alcohol to get the same effects, having symptoms of withdrawal if you stop drinking, and may be unable to control your drinking. Alcohol abuse is dangerous to your liver and you may experience alcohol withdrawal if you stop drinking suddenly. Common symptoms of alcohol withdrawal include shaking, throwing up, sweating, and anxiety. Please seek care immediately or call 911 if you have a convulsion, trouble breathing, chest pain, fast heartbeat, and feel like hurting yourself or someone else. Please continue to take your multivitamin tablet, your thiamine (vitamin B1) tablet, and your folic acid (vitamin B9) tablets and follow up with your primary care provider.     We have provided you the following recommendations for outpatient substance abuse treatment:     [employer] [facility] [employer] [facility] [place]   Main: [phone]   FAX: [phone]   Site: www.[facility].org     [facility]: [facility]   [address] [place] [address]  [phone]    Diagnosis: Chest pain  Assessment and Plan of Treatment: Your chest and back pain are most likely related to receiving CPR by EMS prior to being taken to the emergency room. Your CT and Xrays have ruled out a rib fracture. The pain should improve on its own. We recommend that you use lidocaine patches as needed for pain. We recommend that you do NOT use any Tylenol (acetaminophen) or NSAIDS such as Motrin, Ibuprofen, or Advil. Tylenol is not suggested due to your liver damage. NSAIDs are not recommended because of the damage they can cause to your GI tract.     PRINCIPAL DISCHARGE DIAGNOSIS  Diagnosis: Erosive esophagitis  Assessment and Plan of Treatment: You were diagnosed with esophagitis and gastritis, which are inflammations of the esophagus (the tube running from the throat to the stomach) and the stomach itself, respectively. The cause of your esophagitis was from stomach acids being vomited back up into the tube. The cause of your gastritis is likely from excess alcohol consumption. We have provided you with medications to help with both of these issues. Carafate will help produce a protective lining on your stomach and your throat, preventing further injury. In addition, we are prescribing you a medication called Protonix, which will decrease the production of acid in the stomach, which will also decrease future damage. Inflammation over a long period of time can cause damage to the throat and the stomach, which can cause them to weaken. If enough damage is done, they can rupture, which is life-threatening. The inflammation also increases the risk of developing cancers such as squamous cell cancer of the throat and adenocarcinoma of the stomach. Please take your medications as prescribed and follow up with your GI and primary care doctors.     We have made an appointment for you, but the earliest appointment available was in April. We would like you to see Dr. Villasenor within 2 months. You are to receive a repeat endoscopy in 8 weeks. Until then, please take the Carafate every 6 hours and the Protonix every 12 hours for 8 weeks. We have prescribed a one month supply. You may speak with a primary care physician to receive refills.      SECONDARY DISCHARGE DIAGNOSES  Diagnosis: Closed fracture of right iliac crest  Assessment and Plan of Treatment: You were noted to have a small comminuted fracture of the posterior portion of your pelvis. This was most likely due to your fall. A fracture of this nature should heal on its own over time. The fracture appears stable and you are able to walk. Please follow up with your primary care physician. If you have warmth or pain of your right pelvic region, or if you are concerned about this fracture, please make an appointment with an orthopedic surgeon and show them this paperwork.    Diagnosis: Alcohol withdrawal  Assessment and Plan of Treatment: You have a history of alcohol dependence or alcoholism. With alcohol dependence, you drink alcohol too much and too often for a longer period of time. Dependence symptoms include needing more alcohol to get the same effects, having symptoms of withdrawal if you stop drinking, and may be unable to control your drinking. Alcohol abuse is dangerous to your liver and you may experience alcohol withdrawal if you stop drinking suddenly. Common symptoms of alcohol withdrawal include shaking, throwing up, sweating, and anxiety. Please seek care immediately or call 911 if you have a convulsion, trouble breathing, chest pain, fast heartbeat, and feel like hurting yourself or someone else. Please continue to take your multivitamin tablet, your thiamine (vitamin B1) tablet, and your folic acid (vitamin B9) tablets and follow up with your primary care provider.     We have provided you the following recommendations for outpatient substance abuse treatment:     [employer] [facility] [facility] [place]   Main: [phone]   FAX: [phone]   Site: www[facility].org     [facility]: [facility]   [address] [place] [address]  [phone]    Diagnosis: Chest pain  Assessment and Plan of Treatment: Your chest and back pain are most likely related to receiving CPR by EMS prior to being taken to the emergency room. Your CT and Xrays have ruled out a rib fracture. The pain should improve on its own. We recommend that you use lidocaine patches as needed for pain. You may also use Tessalon Perles to suppress coughing, to minimize aggravation of the chest wall. We recommend that you do NOT use any Tylenol (acetaminophen) or NSAIDS such as Motrin, Ibuprofen, or Advil. Tylenol is not suggested due to your liver damage. NSAIDs are not recommended because of the damage they can cause to your GI tract.

## 2022-01-24 NOTE — PROGRESS NOTE ADULT - PROBLEM SELECTOR PLAN 2
Chronic use of 0.5-1L of vodka daily. Denies a history of alcohol withdrawal seizures or ICU stays. S/p CIWA protocol and Librium 50 q8h (1/20-1/21)  - Thiamine, Folate, MVT supplementation

## 2022-01-24 NOTE — DISCHARGE NOTE PROVIDER - NSDCMRMEDTOKEN_GEN_ALL_CORE_FT
folic acid 1 mg oral tablet: 1 tab(s) orally once a day  Multiple Vitamins oral tablet: 1 tab(s) orally every 24 hours  pantoprazole 40 mg oral delayed release tablet: 1 tab(s) orally every 12 hours  thiamine 100 mg oral tablet: 1 tab(s) orally every 24 hours   Cane:   folic acid 1 mg oral tablet: 1 tab(s) orally once a day  lidocaine 4% topical film: Apply topically to affected area once a day   Multiple Vitamins with Minerals oral tablet: 1 tab(s) orally once a day  Occupational therapy:   Physical therapy:   Protonix 40 mg oral delayed release tablet: 1 tab(s) orally every 12 hours   sucralfate 1 g oral tablet: 1 tab(s) orally every 6 hours  Tessalon Perles 100 mg oral capsule: 1 cap(s) orally 3 times a day  thiamine 100 mg oral tablet: 1 tab(s) orally every 24 hours   benzonatate 100 mg oral capsule: 1 cap(s) orally every 8 hours, As needed, Cough  Cane:   folic acid 1 mg oral tablet: 1 tab(s) orally once a day  lidocaine 4% topical film: Apply topically to affected area once a day  lidocaine 4% topical film: Apply topically to affected area once a day   Multiple Vitamins with Minerals oral tablet: 1 tab(s) orally once a day  Multiple Vitamins with Minerals oral tablet: 1 tab(s) orally once a day  Occupational therapy:   Physical therapy:   Protonix 40 mg oral delayed release tablet: 1 tab(s) orally every 12 hours   thiamine 100 mg oral tablet: 1 tab(s) orally every 24 hours  Zofran 4 mg oral tablet: 1 tab(s) orally once a day as needed for nausea

## 2022-01-24 NOTE — PROGRESS NOTE ADULT - PROBLEM SELECTOR PLAN 1
x2d hx nausea and coffee ground emesis with limited PO intake. Macrocytic anemia. s/p Octreotide & Protonix ggt  - EGD 1/19: erosive esophagitis and gastritis, f/u in 8 weeks EGD  - GI PPx: PO Protonix 40mg BID, Carafate 1g q6h per GI recs  - Nausea PRN: Tigan 200mg q6h

## 2022-01-24 NOTE — DISCHARGE NOTE PROVIDER - NSDCFUADDAPPT_GEN_ALL_CORE_FT
(1) We have scheduled an appointment with your gastroenterologist Dr. Villasenor. Please follow up with him at the scheduled appointment time. His earliest appointment date is in April. We recommend that you call his office periodically to request for an earlier appointment date.    (2) Please call and schedule an appointment with your primary care provider for within 2 weeks of discharge.

## 2022-01-24 NOTE — DISCHARGE NOTE PROVIDER - CARE PROVIDERS DIRECT ADDRESSES
[email or web address]

## 2022-01-24 NOTE — DISCHARGE NOTE PROVIDER - HOSPITAL COURSE
#Discharge: do not delete    Patient is __ yo M/F with past medical history of _____, presented with _____, found to have _____    Inpatient treatment course:     Hospital course (by problem):     New medications/therapies:   New lines/hardware:  Labs to be followed outpatient:   Exam to be followed outpatient:     Discharge plan: discharge to     #Discharge: do not delete    Patient is __ yo M/F with past medical history of _____, presented with _____, found to have _____    Inpatient treatment course: s/p 5L LR total boluses, Librium 50 q8h (1/20-1/21), s/p Octreotide & Protonix ggt, s/p vanc/Zosyn/CTX x1    Hospital course (by problem):   #Erosive Esophagitis  #Erosive Gastritis  #Coffee ground emesis  x2d hx nausea and coffee ground emesis with limited PO intake.  - EGD 1/19: erosive esophagitis  - GI PPx: PO Protonix 40mg Qd, Carafate 1g q6h  - Nausea PRN: Tigan 200mg q6h.    #Alcohol withdrawal.   Chronic use of 0.5-1L of vodka daily. Denies a history of alcohol withdrawal seizures or ICU stays.   - Thiamine, Folate, MVT supplementation  - PRN Ativan for CIWA >8.    #Prolonged QT interval.   RESOLVED. QTc 565 on admission, however resolved to 460s on 1/23.  - Avoid QT prolonging medications  - Continue to monitor EKG qd.    #Pancreatitis.   On admission, lipase 976 and diffuse abdominal pain, CT findings negative. Lactate 7.3 > 1.9, no signs of end organ failure, patient not tolerated PO intake with nausea and vomiting  - BClx & UClx NGTD.    #Transaminitis.   I/s/o active alcohol use. CT & US marked hepatomegaly and steatosis. Current elevation with AST>ALT, elevated bili, and coagulopathy. Utox negative.  - Maddrey Discriminant Function 15 on admission, good prognosis  - Trend INR, CMP daily.    #Hepatic steatosis.   See above.    #Pancytopenia.   Likely from BM suppression from chronic excessive alcohol use  - monitor.    #Macrocytic anemia.   Acute GI blood loss anemia + nutritional deficiencies. EGD: Erosive esophagitis. B12 elevated, low folate.  - Maintain active T&S, large bore IV access, transfuse hgb<7  - Folate deficient, c/w folate 1mg qd, MV, thiamine.    #Anxiety and depression.   Not on home medication  - Psych consulted for capacity as patient wants to AMA, currently lacks capacity  - F/u psych recs regarding capacity  - SW needed for discharge.    New medications/therapies:   New lines/hardware:  Labs to be followed outpatient:   Exam to be followed outpatient:     Discharge plan: discharge to     #Discharge: do not delete    47F PMHx of chronic alcoholism (no hx ICU adm or seizures), anxiety, depression presenting alcohol w/d with N/V of coffee ground emesis, EGD revealed erosive esophagitis and gastritis    Inpatient treatment course: s/p 5L LR total boluses, Librium 50 q8h (1/20-1/21), s/p Octreotide & Protonix ggt, s/p vanc/Zosyn/CTX x1    Hospital course (by problem):   #Erosive Esophagitis  #Erosive Gastritis  #Coffee ground emesis  x2d hx nausea and coffee ground emesis with limited PO intake. EGD 1/19: erosive esophagitis.  - GI PPx: PO Protonix 40mg BID, Carafate 1g q6h for 8 weeks per GI recs  - F/u EGD in 8 weeks per GI  - F/u w/ Dr Villasenor outpatient.    #Alcohol withdrawal.   Chronic use of 0.5-1L of vodka daily. Denies a history of alcohol withdrawal seizures or ICU stays.   - Thiamine, Folate, MVT supplementation  - Outpatient services provided.    #Prolonged QT interval (RESOLVED)  RESOLVED. QTc 565 on admission, however resolved to 460s on 1/23.  - Avoid QT prolonging medications  - F/u outpatient PCP     #Pancreatitis (RESOLVED)  On admission, lipase 976 and diffuse abdominal pain, CT findings negative. Lactate 7.3 > 1.9, no signs of end organ failure, patient not tolerated PO intake with nausea and vomiting  - BClx & UClx NGTD.    #Transaminitis (RESOLVING)  I/s/o active alcohol use. CT & US marked hepatomegaly and steatosis. Current elevation with AST>ALT, elevated bili, and coagulopathy. Utox negative.  - Maddrey Discriminant Function 15 on admission, good prognosis  - F/u w/ GI outpatient    #Hepatic steatosis.   See above.    #Pancytopenia.   Likely from BM suppression from chronic excessive alcohol use  - monitor.    #Macrocytic anemia.   Acute GI blood loss anemia + nutritional deficiencies. EGD: Erosive esophagitis. B12 elevated, low folate.  - Maintain active T&S, large bore IV access, transfuse hgb<7  - Folate deficient, c/w folate 1mg qd, MV, thiamine.    #Anxiety and depression.   Not on home medication  - F/u outpatient PCP    New medications/therapies: Folate, thiamine, MV.  New lines/hardware: None  Labs to be followed outpatient: None  Exam to be followed outpatient: None    Discharge plan: discharge to home w/ outpt PT and OT     #Discharge: do not delete    47F PMHx of chronic alcoholism (no hx ICU adm or seizures), anxiety, depression presenting alcohol w/d with N/V of coffee ground emesis, EGD revealed erosive esophagitis and gastritis    Inpatient treatment course: s/p 5L LR total boluses, Librium 50 q8h (1/20-1/21), s/p Octreotide & Protonix ggt, s/p vanc/Zosyn/CTX x1    Hospital course (by problem):   #Erosive Esophagitis  #Erosive Gastritis  #Coffee ground emesis  x2d hx nausea and coffee ground emesis with limited PO intake. EGD 1/19: erosive esophagitis.  - GI PPx: PO Protonix 40mg BID, Carafate 1g q6h for 8 weeks per GI recs  - F/u EGD in 8 weeks per GI  - F/u w/ Dr Villasenor outpatient.    #Chest pain  Per pt's family, pt received CPR by EMS prior to [facility] transport. No rib fractures on CT or XR  - C/w lidocaine  - C/w Tessalon perles    #Alcohol withdrawal.   Chronic use of 0.5-1L of vodka daily. Denies a history of alcohol withdrawal seizures or ICU stays.   - Thiamine, Folate, MVT supplementation  - Outpatient services provided.    #Prolonged QT interval (RESOLVED)  RESOLVED. QTc 565 on admission, however resolved to 460s on 1/23.  - Avoid QT prolonging medications  - F/u outpatient PCP     #Pancreatitis (RESOLVED)  On admission, lipase 976 and diffuse abdominal pain, CT findings negative. Lactate 7.3 > 1.9, no signs of end organ failure, patient not tolerated PO intake with nausea and vomiting  - BClx & UClx NGTD.    #Transaminitis (RESOLVING)  I/s/o active alcohol use. CT & US marked hepatomegaly and steatosis. Current elevation with AST>ALT, elevated bili, and coagulopathy. Utox negative.  - Maddrey Discriminant Function 15 on admission, good prognosis  - F/u w/ GI outpatient    #Hepatic steatosis.   See above.    #Pancytopenia.   Likely from BM suppression from chronic excessive alcohol use  - monitor.    #Macrocytic anemia.   Acute GI blood loss anemia + nutritional deficiencies. EGD: Erosive esophagitis. B12 elevated, low folate.  - Maintain active T&S, large bore IV access, transfuse hgb<7  - Folate deficient, c/w folate 1mg qd, MV, thiamine.    #Anxiety and depression.   Not on home medication  - F/u outpatient PCP    New medications/therapies: Folate, thiamine, MV.  New lines/hardware: None  Labs to be followed outpatient: None  Exam to be followed outpatient: None    Discharge plan: discharge to home w/ outpt PT and OT     47F PMHx of chronic alcoholism (no hx ICU adm or seizures), anxiety, depression presenting alcohol w/d with N/V of coffee ground emesis, EGD revealed erosive esophagitis and gastritis    Inpatient treatment course: s/p 5L LR total boluses, Librium 50 q8h (1/20-1/21), s/p Octreotide & Protonix ggt, s/p vanc/Zosyn/CTX x1    Hospital course (by problem):   #Erosive Esophagitis  #Erosive Gastritis  #Coffee ground emesis  x2d hx nausea and coffee ground emesis with limited PO intake. EGD 1/19: erosive esophagitis.  - GI PPx: PO Protonix 40mg BID, Carafate 1g q6h for 8 weeks per GI recs  - F/u EGD in 8 weeks per GI  - F/u w/ Dr Villasenor outpatient.    #Chest pain  Per pt's family, pt received CPR by EMS prior to [facility] transport. No rib fractures on CT or XR  - C/w lidocaine  - C/w Tessalon perles    #Alcohol withdrawal.   Chronic use of 0.5-1L of vodka daily. Denies a history of alcohol withdrawal seizures or ICU stays.   - Thiamine, Folate, MVT supplementation  - Outpatient services provided.    #Prolonged QT interval (RESOLVED)  RESOLVED. QTc 565 on admission, however resolved to 460s on 1/23.  - Avoid QT prolonging medications  - F/u outpatient PCP     #Pancreatitis (RESOLVED)  On admission, lipase 976 and diffuse abdominal pain, CT findings negative. Lactate 7.3 > 1.9, no signs of end organ failure, patient not tolerated PO intake with nausea and vomiting  - BClx & UClx NGTD.    #Transaminitis (RESOLVING)  I/s/o active alcohol use. CT & US marked hepatomegaly and steatosis. Current elevation with AST>ALT, elevated bili, and coagulopathy. Utox negative.  - Maddrey Discriminant Function 15 on admission, good prognosis  - F/u w/ GI outpatient    #Hepatic steatosis.   See above.    #Pancytopenia.   Likely from BM suppression from chronic excessive alcohol use  - monitor.    #Macrocytic anemia.   Acute GI blood loss anemia + nutritional deficiencies. EGD: Erosive esophagitis. B12 elevated, low folate.  - Maintain active T&S, large bore IV access, transfuse hgb<7  - Folate deficient, c/w folate 1mg qd, MV, thiamine.    #Anxiety and depression.   Not on home medication  - F/u outpatient PCP    New medications/therapies: Folate, thiamine, MV.  New lines/hardware: None  Labs to be followed outpatient: None  Exam to be followed outpatient: None    Discharge plan: discharge to home w/ outpt PT and OT     47F PMHx of chronic alcoholism (no hx ICU adm or seizures), anxiety, depression presenting alcohol w/d with N/V of coffee ground emesis, EGD revealed erosive esophagitis and gastritis. Pt admitted to tele-stepdown [facility] for monitoring. Patient found to be persistently hypokalemic requiring aggressive repletion. s/p 5L LR total boluses, Librium 50 q8h (1/20-1/21), s/p Octreotide & Protonix ggt, s/p vanc/Zosyn/CTX x1.  Also had macrocytic anemia, transaminitis, elevated DBili, lipase & CK. Imaging revealed acute comminuted fracture posterior right iliac crest, no surgical intervention. Monitored on CIWA protocol throughout hospital stay.  Placed on IV Protonix and Octreotide for UGIB. EGD performed 1/19 showed erosive esophagitis. GI ppx deescalated to PO protonix & carafate. Abdomen US hepatomegaly 21.1 cm, increased echogenicity, previously liver masses not visualized on this study. Stepped down to F for further monitoring. Patient having intermittent diarrhea with carafate therefore it was discontinued. Pt remained stable on RMF and stable for DC home. Pt seen by physical therapy and stable for home with outpatient PT.     Hospital course (by problem):   #Erosive Esophagitis  #Erosive Gastritis  #Coffee ground emesis  x2d hx nausea and coffee ground emesis with limited PO intake. EGD 1/19: erosive esophagitis.  - GI PPx: PO Protonix 40mg BID, Carafate 1g q6h for 8 weeks per GI recs  - F/u EGD in 8 weeks per GI  - F/u w/ Dr Villasenor outpatient.    #Chest pain  Per pt's family, [facility] No rib fractures on CT or XR  - C/w lidocaine  - C/w Tessalon perles    #Alcohol withdrawal.   Chronic use of 0.5-1L of vodka daily. Denies a history of alcohol withdrawal seizures or ICU stays.   - Thiamine, Folate, MVT supplementation  - Outpatient services provided.    #Prolonged QT interval (RESOLVED)  RESOLVED. QTc 565 on admission, however resolved to 460s on 1/23.  - Avoid QT prolonging medications  - F/u outpatient PCP     #Pancreatitis (RESOLVED)  On admission, lipase 976 and diffuse abdominal pain, CT findings negative. Lactate 7.3 > 1.9, no signs of end organ failure, patient not tolerated PO intake with nausea and vomiting  - BClx & UClx NGTD.    #Transaminitis (RESOLVING)  I/s/o active alcohol use. CT & US marked hepatomegaly and steatosis. Current elevation with AST>ALT, elevated bili, and coagulopathy. Utox negative.  - Maddrey Discriminant Function 15 on admission, good prognosis  - F/u w/ GI outpatient    #Hepatic steatosis.   See above.    #Pancytopenia.   Likely from BM suppression from chronic excessive alcohol use  - monitor.    #Macrocytic anemia.   Acute GI blood loss anemia + nutritional deficiencies. EGD: Erosive esophagitis. B12 elevated, low folate.  - Maintain active T&S, large bore IV access, transfuse hgb<7  - Folate deficient, c/w folate 1mg qd, MV, thiamine.    #Anxiety and depression.   Not on home medication  - F/u outpatient PCP    New medications/therapies: Folate, thiamine, MV.  New lines/hardware: None  Labs to be followed outpatient: None  Exam to be followed outpatient: None    Discharge plan: discharge to home w/ outpt PT and OT     47F PMHx of chronic alcoholism (no hx ICU adm or seizures), anxiety, depression presenting alcohol w/d with N/V of coffee ground emesis, EGD revealed erosive esophagitis and gastritis. Pt admitted to tele-stepdown [facility] for monitoring. Patient found to be persistently hypokalemic requiring aggressive repletion. s/p 5L LR total boluses, Librium 50 q8h (1/20-1/21), s/p Octreotide & Protonix ggt, s/p vanc/Zosyn/CTX x1.  Also had macrocytic anemia, transaminitis, elevated DBili, lipase & CK. Imaging revealed acute comminuted fracture posterior right iliac crest, no surgical intervention. Monitored on CIWA protocol throughout hospital stay.  Placed on IV Protonix and Octreotide for UGIB. EGD performed 1/19 showed erosive esophagitis. GI ppx deescalated to PO protonix & carafate. Abdomen US hepatomegaly 21.1 cm, increased echogenicity, previously liver masses not visualized on this study. Stepped down to F for further monitoring. Patient having intermittent diarrhea with carafate therefore it was discontinued. Pt remained stable on RMF and stable for DC home. Pt seen by physical therapy and stable for home with outpatient PT.     Hospital course (by problem):   #Erosive Esophagitis  #Erosive Gastritis  #Coffee ground emesis  x2d hx nausea and coffee ground emesis with limited PO intake. EGD 1/19: erosive esophagitis.  - GI PPx: PO Protonix 40mg BID, Carafate 1g q6h for 8 weeks per GI recs  - F/u EGD in 8 weeks per GI  - F/u w/ Dr Villasenor outpatient.    #R sided Chest Wall Pain   Per pt's family, pt received CPR by EMS prior to [facility] transport. No rib fractures on CT or XR. Likely 2/2 CPR   - C/w lidocaine patch to affected area once daily.   - C/w Tessalon perles for PRN Cough     #Alcohol withdrawal.   Chronic use of 0.5-1L of vodka daily. Denies a history of alcohol withdrawal seizures or ICU stays.   - Thiamine, Folate, MVT supplementation to be continued   - Outpatient services provided.    #Prolonged QT interval (RESOLVED)  RESOLVED.   QTc 565 on admission, however resolved to 460s on 1/23.  - Avoid QT prolonging medications  - F/u outpatient PCP     #Pancreatitis (RESOLVED)  On admission, lipase 976 and diffuse abdominal pain, CT findings negative. Lactate 7.3 > 1.9, no signs of end organ failure, patient not tolerated PO intake with nausea and vomiting  - BClx & UClx NGTD.    #Transaminitis (RESOLVING)  I/s/o active alcohol use. CT & US marked hepatomegaly and steatosis. Current elevation with AST>ALT, elevated bili, and coagulopathy. Utox negative.  - Maddrey Discriminant Function 15 on admission, good prognosis  - F/u w/ GI outpatient    #Hepatic steatosis.   See above.    #Pancytopenia.   Likely from BM suppression from chronic excessive alcohol use  - monitor.    #Macrocytic anemia.   Acute GI blood loss anemia + nutritional deficiencies. EGD: Erosive esophagitis. B12 elevated, low folate.  - Maintain active T&S, large bore IV access, transfuse hgb<7  - Folate deficient, c/w folate 1mg qd, MV, thiamine.    #Anxiety and depression.   Not on home medication  - F/u outpatient PCP    New medications/therapies: Folate, thiamine, MV.  New lines/hardware: None  Labs to be followed outpatient: None  Exam to be followed outpatient: None    Discharge plan: discharge to home w/ outpt PT and OT     47F PMHx of chronic alcoholism (no hx ICU adm or seizures), anxiety, depression presenting alcohol w/d with N/V of coffee ground emesis, EGD revealed erosive esophagitis and gastritis. Pt admitted to tele-stepdown [facility] for monitoring. Patient found to be persistently hypokalemic requiring aggressive repletion. s/p 5L LR total boluses, Librium 50 q8h (1/20-1/21), s/p Octreotide & Protonix ggt, s/p vanc/Zosyn/CTX x1.  Also had macrocytic anemia, transaminitis, elevated DBili, lipase & CK. Imaging revealed acute comminuted fracture posterior right iliac crest, no surgical intervention. Monitored on CIWA protocol throughout hospital stay.  Placed on IV Protonix and Octreotide for UGIB. EGD performed 1/19 showed erosive esophagitis. GI ppx deescalated to PO protonix & carafate. Abdomen US hepatomegaly 21.1 cm, increased echogenicity, previously liver masses not visualized on this study. Stepped down to F for further monitoring. Patient having intermittent diarrhea with carafate therefore it was discontinued. Pt remained stable on RMF and stable for DC home. Pt seen by physical therapy and stable for home with outpatient PT.     Hospital course (by problem):   #Erosive Esophagitis  #Erosive Gastritis  #Coffee ground emesis  x2d hx nausea and coffee ground emesis with limited PO intake. EGD 1/19: erosive esophagitis.  - GI PPx: PO Protonix 40mg BID, Carafate 1g q6h for 8 weeks per GI recs  - F/u EGD in 8 weeks per GI  - F/u w/ Dr Villasenor outpatient.    #R sided Chest Wall Pain   Per pt's family, pt received CPR by EMS prior to [facility] transport (though eventually noted not to be in cardiac arrest). No rib fractures on CT or XR. Likely 2/2 CPR   - C/w lidocaine patch to affected area once daily.   - C/w Tessalon perles for PRN Cough     #Alcohol withdrawal.   Chronic use of 0.5-1L of vodka daily. Denies a history of alcohol withdrawal seizures or ICU stays.   - Thiamine, Folate, MVT supplementation to be continued   - Outpatient services provided.    #Prolonged QT interval (RESOLVED)  RESOLVED.   QTc 565 on admission, however resolved to 460s on 1/23.  - Avoid QT prolonging medications  - F/u outpatient PCP     #Pancreatitis (RESOLVED)  On admission, lipase 976 and diffuse abdominal pain, CT findings negative. Lactate 7.3 > 1.9, no signs of end organ failure, patient not tolerated PO intake with nausea and vomiting  - BClx & UClx NGTD.    #Transaminitis (RESOLVING)  I/s/o active alcohol use. CT & US marked hepatomegaly and steatosis. Current elevation with AST>ALT, elevated bili, and coagulopathy. Utox negative.  - Maddrey Discriminant Function 15 on admission, good prognosis  - F/u w/ GI outpatient    #Hepatic steatosis.   See above.    #Pancytopenia.   Likely from BM suppression from chronic excessive alcohol use  - monitor.    #Macrocytic anemia.   Acute GI blood loss anemia + nutritional deficiencies. EGD: Erosive esophagitis. B12 elevated, low folate.  - Maintain active T&S, large bore IV access, transfuse hgb<7  - Folate deficient, c/w folate 1mg qd, MV, thiamine.    #Anxiety and depression.   Not on home medication  - F/u outpatient PCP    New medications/therapies: Folate, thiamine, MV.  New lines/hardware: None  Labs to be followed outpatient: None  Exam to be followed outpatient: None    Discharge plan: discharge to home w/ outpt PT and OT    ATTENDING ATTESTATION  Date of Service: 1/26/22    I interviewed and examined Philly Vivar on the day of discharge and greater than 30 minutes were spent by the team on processing their hospital discharge and coordinating their post-hospital care.     I discussed the care plan with the resident team. I agree with the discharge plan as outlined in the Discharge Summary. I have personally reviewed the above discharge summary and made changes where necessary, and as noted below.    47YOF with history of alcohol use disorder, anxiety/depression admitted for alcohol withdrawal and upper GIB with acute blood loss anemia found on EGD to have Grade D erosive esophagitis, also with alcoholic pancreatitis and hepatitis this admission. H/H stabilized without further bleeding, abdominal pain resolved, stable for discharge home today with PPI. Will need PCP and GI followup within 1-2 weeks.    Physical exam on day of discharge:  General: pleasant, appropriate, no acute distress. Participating appropriately in interview.  HEENT: NC/AT, MMM  Neck: soft, supple, no lymphadenopathy  Cardiac: regular rhythm, normal rate, normal s1/s2, no murmurs, rubs, or gallops  Lungs: clear to auscultation bilaterally without wheezes, rales, or rhonchi. Normal work of breathing. Speaking in complete sentences.  Abdomen: soft, nontender, nondistended. Bowel sounds present and normoactive.   Extremities: moving all extremities. No edema.  Neuro: awake, alert, oriented x4. Follows commands. Moving all extremities. Sensation intact.  Psych: no evidence of AVH.    Sandro Tomlinson MD  Attending Hospitalist

## 2022-01-24 NOTE — PROGRESS NOTE ADULT - PROBLEM SELECTOR PLAN 3
RESOLVED. QTc 565 on admission, however resolved to 460s on 1/23.  - Avoid QT prolonging medications  - Continue to monitor EKG

## 2022-01-24 NOTE — PROGRESS NOTE ADULT - ASSESSMENT
47F PMHx of chronic alcoholism (no hx ICU adm or seizures), anxiety, depression presenting alcohol w/d with N/V of coffee ground emesis, s/p octreotide and Pantoprazole ggts, EGD revealed erosive esophagitis and gastritis.

## 2022-01-25 LAB
ALBUMIN SERPL ELPH-MCNC: 3.2 G/DL — LOW (ref 3.3–5)
ALP SERPL-CCNC: 159 U/L — HIGH (ref 40–120)
ALT FLD-CCNC: 96 U/L — HIGH (ref 10–45)
ANION GAP SERPL CALC-SCNC: 11 MMOL/L — SIGNIFICANT CHANGE UP (ref 5–17)
APTT BLD: 24.9 SEC — LOW (ref 27.5–35.5)
AST SERPL-CCNC: 80 U/L — HIGH (ref 10–40)
BILIRUB SERPL-MCNC: 2.8 MG/DL — HIGH (ref 0.2–1.2)
BLD GP AB SCN SERPL QL: NEGATIVE — SIGNIFICANT CHANGE UP
BUN SERPL-MCNC: 7 MG/DL — SIGNIFICANT CHANGE UP (ref 7–23)
CALCIUM SERPL-MCNC: 8.7 MG/DL — SIGNIFICANT CHANGE UP (ref 8.4–10.5)
CHLORIDE SERPL-SCNC: 103 MMOL/L — SIGNIFICANT CHANGE UP (ref 96–108)
CO2 SERPL-SCNC: 23 MMOL/L — SIGNIFICANT CHANGE UP (ref 22–31)
CREAT SERPL-MCNC: 0.56 MG/DL — SIGNIFICANT CHANGE UP (ref 0.5–1.3)
GLUCOSE SERPL-MCNC: 99 MG/DL — SIGNIFICANT CHANGE UP (ref 70–99)
HCT VFR BLD CALC: 22.2 % — LOW (ref 34.5–45)
HCT VFR BLD CALC: 27.9 % — LOW (ref 34.5–45)
HGB BLD-MCNC: 6.9 G/DL — CRITICAL LOW (ref 11.5–15.5)
HGB BLD-MCNC: 8.6 G/DL — LOW (ref 11.5–15.5)
INR BLD: 1.06 — SIGNIFICANT CHANGE UP (ref 0.88–1.16)
MAGNESIUM SERPL-MCNC: 1.9 MG/DL — SIGNIFICANT CHANGE UP (ref 1.6–2.6)
MCHC RBC-ENTMCNC: 30.8 GM/DL — LOW (ref 32–36)
MCHC RBC-ENTMCNC: 31.1 GM/DL — LOW (ref 32–36)
MCHC RBC-ENTMCNC: 33.9 PG — SIGNIFICANT CHANGE UP (ref 27–34)
MCHC RBC-ENTMCNC: 34 PG — SIGNIFICANT CHANGE UP (ref 27–34)
MCV RBC AUTO: 109.4 FL — HIGH (ref 80–100)
MCV RBC AUTO: 109.8 FL — HIGH (ref 80–100)
NRBC # BLD: 0 /100 WBCS — SIGNIFICANT CHANGE UP (ref 0–0)
NRBC # BLD: 0 /100 WBCS — SIGNIFICANT CHANGE UP (ref 0–0)
PHOSPHATE SERPL-MCNC: 2.7 MG/DL — SIGNIFICANT CHANGE UP (ref 2.5–4.5)
PLATELET # BLD AUTO: 333 K/UL — SIGNIFICANT CHANGE UP (ref 150–400)
PLATELET # BLD AUTO: 359 K/UL — SIGNIFICANT CHANGE UP (ref 150–400)
POTASSIUM SERPL-MCNC: 4.4 MMOL/L — SIGNIFICANT CHANGE UP (ref 3.5–5.3)
POTASSIUM SERPL-SCNC: 4.4 MMOL/L — SIGNIFICANT CHANGE UP (ref 3.5–5.3)
PROT SERPL-MCNC: 5.6 G/DL — LOW (ref 6–8.3)
PROTHROM AB SERPL-ACNC: 12.7 SEC — SIGNIFICANT CHANGE UP (ref 10.6–13.6)
RBC # BLD: 2.03 M/UL — LOW (ref 3.8–5.2)
RBC # BLD: 2.54 M/UL — LOW (ref 3.8–5.2)
RBC # FLD: 17.6 % — HIGH (ref 10.3–14.5)
RBC # FLD: 19.6 % — HIGH (ref 10.3–14.5)
RH IG SCN BLD-IMP: POSITIVE — SIGNIFICANT CHANGE UP
SODIUM SERPL-SCNC: 137 MMOL/L — SIGNIFICANT CHANGE UP (ref 135–145)
WBC # BLD: 5.46 K/UL — SIGNIFICANT CHANGE UP (ref 3.8–10.5)
WBC # BLD: 5.65 K/UL — SIGNIFICANT CHANGE UP (ref 3.8–10.5)
WBC # FLD AUTO: 5.46 K/UL — SIGNIFICANT CHANGE UP (ref 3.8–10.5)
WBC # FLD AUTO: 5.65 K/UL — SIGNIFICANT CHANGE UP (ref 3.8–10.5)

## 2022-01-25 PROCEDURE — 99233 SBSQ HOSP IP/OBS HIGH 50: CPT | Mod: GC

## 2022-01-25 RX ORDER — MIDODRINE HYDROCHLORIDE 2.5 MG/1
5 TABLET ORAL EVERY 8 HOURS
Refills: 0 | Status: DISCONTINUED | OUTPATIENT
Start: 2022-01-25 | End: 2022-01-26

## 2022-01-25 RX ADMIN — Medication 1 GRAM(S): at 06:19

## 2022-01-25 RX ADMIN — Medication 1 MILLIGRAM(S): at 11:42

## 2022-01-25 RX ADMIN — MIDODRINE HYDROCHLORIDE 5 MILLIGRAM(S): 2.5 TABLET ORAL at 22:57

## 2022-01-25 RX ADMIN — MIDODRINE HYDROCHLORIDE 5 MILLIGRAM(S): 2.5 TABLET ORAL at 17:20

## 2022-01-25 RX ADMIN — PANTOPRAZOLE SODIUM 40 MILLIGRAM(S): 20 TABLET, DELAYED RELEASE ORAL at 06:22

## 2022-01-25 RX ADMIN — POLYETHYLENE GLYCOL 3350 17 GRAM(S): 17 POWDER, FOR SOLUTION ORAL at 11:42

## 2022-01-25 RX ADMIN — Medication 100 MILLIGRAM(S): at 11:42

## 2022-01-25 RX ADMIN — Medication 1 TABLET(S): at 11:42

## 2022-01-25 RX ADMIN — Medication 1 GRAM(S): at 11:42

## 2022-01-25 RX ADMIN — Medication 1 GRAM(S): at 00:58

## 2022-01-25 NOTE — PROGRESS NOTE ADULT - PROBLEM SELECTOR PLAN 2
RESOLVED. Chronic use of 0.5-1L of vodka daily. Denies a history of alcohol withdrawal seizures or ICU stays. S/p CIWA protocol and Librium 50 q8h (1/20-1/21)  - Thiamine, Folate, MVT supplementation

## 2022-01-25 NOTE — PROGRESS NOTE ADULT - SUBJECTIVE AND OBJECTIVE BOX
OVERNIGHT EVENTS: No acute events overnight.     SUBJECTIVE / INTERVAL HPI: Patient seen and examined at bedside. Pt reports she "almost fell" in the bathroom overnight due to weakness. Pt reports continued yellow/green diarrhea after taking Carafate. Denies headache, lightheadedness, N/V, abd pain, tremors.    VITAL SIGNS:  Vital Signs Last 24 Hrs  T(C): 36.9 (25 Jan 2022 16:24), Max: 36.9 (24 Jan 2022 21:10)  T(F): 98.5 (25 Jan 2022 16:24), Max: 98.5 (25 Jan 2022 16:24)  HR: 85 (25 Jan 2022 16:24) (78 - 96)  BP: 112/75 (25 Jan 2022 16:24) (93/62 - 112/75)  BP(mean): --  RR: 18 (25 Jan 2022 16:24) (16 - 18)  SpO2: 99% (25 Jan 2022 16:24) (98% - 100%)    PHYSICAL EXAM:  Constitutional: NAD, comfortable in bed, CIWA0  HEENT: NC/AT, PERRLA, EOMI, scleral icterus, MMM  Neck: Supple, no JVD  Respiratory: CTA B/L. No w/r/r.   Cardiovascular: RRR, normal S1 and S2, no m/r/g.   Gastrointestinal: +BS, firm, NTND, no guarding or rebound tenderness, no palpable masses   Extremities: wwp; no cyanosis, clubbing or edema.   Vascular: Pulses equal and strong throughout.   Neurological: AAOx3, no CN deficits, strength and sensation intact throughout.   Skin: Diffuse ecchymoses from fall    MEDICATIONS:  MEDICATIONS  (STANDING):  folic acid 1 milliGRAM(s) Oral daily  influenza   Vaccine 0.5 milliLiter(s) IntraMuscular once  lidocaine   4% Patch 1 Patch Transdermal every 24 hours  midodrine 5 milliGRAM(s) Oral every 8 hours  multivitamin/minerals 1 Tablet(s) Oral daily  pantoprazole    Tablet 40 milliGRAM(s) Oral before breakfast  polyethylene glycol 3350 17 Gram(s) Oral daily  thiamine 100 milliGRAM(s) Oral daily    MEDICATIONS  (PRN):  acetaminophen     Tablet .. 650 milliGRAM(s) Oral every 8 hours PRN Temp greater or equal to 38C (100.4F), Mild Pain (1 - 3)  benzonatate 100 milliGRAM(s) Oral every 8 hours PRN Cough  trimethobenzamide Injectable 200 milliGRAM(s) IntraMuscular every 6 hours PRN Nausea and/or Vomiting      ALLERGIES:  Allergies    No Known Allergies    Intolerances        LABS:                        8.6    5.46  )-----------( 359      ( 25 Jan 2022 16:26 )             27.9     01-25    137  |  103  |  7   ----------------------------<  99  4.4   |  23  |  0.56    Ca    8.7      25 Jan 2022 07:15  Phos  2.7     01-25  Mg     1.9     01-25    TPro  5.6<L>  /  Alb  3.2<L>  /  TBili  2.8<H>  /  DBili  x   /  AST  80<H>  /  ALT  96<H>  /  AlkPhos  159<H>  01-25    PT/INR - ( 25 Jan 2022 07:15 )   PT: 12.7 sec;   INR: 1.06          PTT - ( 25 Jan 2022 07:15 )  PTT:24.9 sec    CAPILLARY BLOOD GLUCOSE          RADIOLOGY & ADDITIONAL TESTS: Reviewed.

## 2022-01-25 NOTE — PROGRESS NOTE ADULT - PROBLEM SELECTOR PLAN 1
RESOLVED. x2d hx nausea and coffee ground emesis with limited PO intake. Macrocytic anemia. s/p Octreotide & Protonix ggt  - EGD 1/19: erosive esophagitis and gastritis, f/u in 8 weeks EGD  - GI PPx: PO Protonix 40mg BID, Carafate 1g q6h for 8 weeks on discharge per GI recs  - Nausea PRN: Tigan 200mg q6h    #Erosive esophagitis/gastritis  Will dc Carafate given diarrhea side effect in patient.  - C/w PO Protonix 40mg BID for 8 weeks after discharge  - Repeat EGD in 8 weeks by Dr. Villasenor

## 2022-01-25 NOTE — PROGRESS NOTE ADULT - NUTRITIONAL ASSESSMENT
This patient has been assessed with a concern for Malnutrition and has been determined to have a diagnosis/diagnoses of Moderate protein-calorie malnutrition and Underweight (BMI < 19).    This patient is being managed with:   Diet Soft and Bite Sized-  Entered: Jan 19 2022  5:01PM    

## 2022-01-25 NOTE — PROGRESS NOTE ADULT - PROVIDER SPECIALTY LIST ADULT
Internal Medicine
Gastroenterology
Internal Medicine

## 2022-01-25 NOTE — PROGRESS NOTE ADULT - PROBLEM SELECTOR PROBLEM 7
Pancytopenia
Macrocytic anemia
Pancytopenia
Pancytopenia
Macrocytic anemia

## 2022-01-25 NOTE — PROGRESS NOTE ADULT - PROBLEM SELECTOR PROBLEM 8
Macrocytic anemia
Anxiety and depression
Macrocytic anemia
Macrocytic anemia

## 2022-01-25 NOTE — PROGRESS NOTE ADULT - PROBLEM SELECTOR PROBLEM 9
Hypokalemia
Anxiety and depression
Hypokalemia
Anxiety and depression
Hypokalemia
Anxiety and depression
Hypokalemia
Hypokalemia

## 2022-01-25 NOTE — PROGRESS NOTE ADULT - PROBLEM SELECTOR PROBLEM 5
Hepatic steatosis
Transaminitis
Hepatic steatosis
Transaminitis
Hepatic steatosis
Transaminitis

## 2022-01-25 NOTE — PROGRESS NOTE ADULT - PROBLEM SELECTOR PLAN 11
F: None   E: Replete as necessary K>4 Mg>2  N: DASH/TLC    DVT Prophylaxis: None  GI prophylaxis: PO Protonix & Carafate  CODE STATUS: FULL  Dispo: Home w/ outpatient PT and OT, w/ cane
F: None   E: Replete as necessary K>4 Mg>2  N: DASH/TLC    DVT Prophylaxis: None  GI prophylaxis: PO Protonix & Carafate  CODE STATUS: FULL  Dispo: Home w/ outpatient PT and OT, w/ cane
F: None   E: Replete as necessary K>4 Mg>2  N: DASH/TLC    DVT Prophylaxis: None  GI prophylaxis: PO Protonix & Carafate  CODE STATUS: FULL

## 2022-01-25 NOTE — PROGRESS NOTE ADULT - PROBLEM SELECTOR PROBLEM 4
Transaminitis
Pancreatitis
Transaminitis
Pancreatitis
Pancreatitis

## 2022-01-25 NOTE — PROGRESS NOTE ADULT - PROBLEM SELECTOR PROBLEM 1
Coffee ground emesis

## 2022-01-25 NOTE — PROGRESS NOTE ADULT - PROBLEM SELECTOR PROBLEM 3
Pancreatitis
Prolonged QT interval
Pancreatitis
Prolonged QT interval
Pancreatitis
Prolonged QT interval

## 2022-01-25 NOTE — PROGRESS NOTE ADULT - PROBLEM SELECTOR PROBLEM 10
Healthcare maintenance
Hypokalemia
Healthcare maintenance
Hypokalemia
Healthcare maintenance
Hypokalemia

## 2022-01-25 NOTE — PROGRESS NOTE ADULT - PROBLEM SELECTOR PLAN 5
See above
I/s/o active alcohol use. CT & US marked hepatomegaly and steatosis. Current elevation with AST>ALT, elevated bili, and coagulopathy. Utox negative.  - Maddrey Discriminant Function 15 on admission, good prognosis  - Trend INR, CMP daily
See above
I/s/o active alcohol use. CT & US marked hepatomegaly and steatosis. Current elevation with AST>ALT, elevated bili, and coagulopathy. Utox negative.  - Maddrey Discriminant Function 15 on admission, good prognosis  - Trend INR, CMP daily
#Alcoholic hepatitis  CT & US marked hepatomegaly and steatosis. Current elevation with AST>ALT, elevated bili, and coagulopathy. Utox negative.  - Maddrey Discriminant Function 15 on admission, good prognosis and no steroids necessary  - Trend INR, CMP daily

## 2022-01-25 NOTE — PROGRESS NOTE ADULT - PROBLEM SELECTOR PLAN 4
RESOLVED. On admission, lipase 976 and diffuse abdominal pain, CT findings negative. Lactate 7.3 > 1.9, no signs of end organ failure, patient not tolerated PO intake with nausea and vomiting  - BClx & UClx NGTD

## 2022-01-25 NOTE — PROGRESS NOTE ADULT - ATTENDING COMMENTS
Patient discussed with resident team and plan of care reviewed. I have personally reviewed all pertinent labs and imaging and performed an independent history and physical. Resident note personally reviewed, and I agree with above resident note with the following additions:    47YOF with history of alcohol use disorder, anxiety/depression admitted for alcohol withdrawal and upper GIB with acute blood loss anemia found on EGD to have Grade D erosive esophagitis, also with alcoholic pancreatitis and hepatitis this admission.    Overall improving, though Hgb noted to be 6.9 today (has been in mid 7s last several days). No new hematemesis, hematochezia, or melena noted. Will give pRBC transfusion today, monitor post-transfusion CBC and for signs of bleeding. If remains stable and works well with PT anticipate will likely be able to d/c home tomorrow.
Patient is a 47 year old female with a Hx of EtOH abuse (0.5L/day whiskey) presenting with 3 days of persistent vomiting with intermittent "coffee ground" emesis. GI consulted for concern for GI hemorrhage.    Macrocytic anemia - Likely component of acute GI blood loss anemia coupled with nutritional deficiencies. Has had similar episodes in the past though cannot recall last upper endoscopy. Presented with Hgb of 11.5 but has now decreased to 6.8, likely reflective of hydration given for hypothermia and hypotension. Repeat without transfusion of 9.2, more consistent with patient's baseline, though still lower than previous. Etiology presumably MW tear vs erosive esophagitis or gastritis, less likely varices as no clear evidence of cirrhosis, but may be masked by element of alc hep. Prior and current imaging reviewed. No ascites, but hepatomegaly with normal spleen size. Patient s/p EGD on 1/19 with Grade D esophagitis without active bleeding, gastritis.  - Thiamine, Folate, MVT supplementation  - Continue PPI 40mg PO BID and Carafate suspension 1g Q6H for 8 weeks  - Patient should follow-up for repeat endoscopy in 8 weeks to assess for mucosal healing  - Maintain active T&S, large bore IV access  - Transfusion threshold per primary team  - Advance diet as tolerated    Elevated LTs - In the setting of active alcohol use. Question of underlying steatosis vs cirrhosis, but no clear evidence of decompensation (no ascites, no known history of variceal hemorrhage, no documented hepatic encephalopathy, no splenomegaly on CT). Current elevation with AST>ALT, elevated bili, and coagulopathy. With hepatomegaly on CT constellation of findings suggestive of alcoholic hepatitis.   - Maddrey Discriminant Function score of 15 on admission suggestive of good prognosis, improving  - INR and CMP daily  - SW for EtOH cessation  - Nutrition evaluation  - CIWA protocol per primary medical team  - Counseled on alcohol cessation and likelihood of progression to chronic liver disease or death if she does not stop  - Given hypophosphatemia, would monitor closely for development of refeeding syndrome
47F PMHx of chronic alcoholism (0.5-1L whiskey/day, w/o alcohol w/d seizures or ICU stays), anxiety, depression presenting with nausea and vomiting found to have erosive esophagitis on EGD, alcohol withdrawal and course complicated by lack of capacity as determined by psych    #Erosive esophagitis: c/w Protonix, carafate.  Tigan for nausea PRN  #Etoh withdrawal: stable off benzos, c/w vitamin supplementation  #Encephalopathy: per psych patient without capacity, request re-eval tomorrow as patient seems coherent and understanding of hospitalization today  #Hepatic steatosis   # R iliac fx: f/u PT  #Pancytopenia: bone marrow suppression likely 2/2 EtOH, stable    PT/OT rec [facility]
discharge planning with ppi and GI f/u  alcohol cessation   PT reeval appreciated. advised d/c to [facility]
Patient admitted with UGIB 2/2 erosive gastritis and monitoring of alcohol withdrawal syndrome. monitor lytes, replete as needed. c/w thiamine, MV, folci acid. f/u with GI. stable for transfer to the floor.

## 2022-01-25 NOTE — PROGRESS NOTE ADULT - PROBLEM SELECTOR PLAN 6
Likely from BM suppression from chronic excessive alcohol use  - monitor
See above
Likely from BM suppression from chronic excessive alcohol use  - monitor
Likely from BM suppression from chronic excessive alcohol use  - monitor
See above
See above

## 2022-01-25 NOTE — PROGRESS NOTE ADULT - PROBLEM SELECTOR PROBLEM 6
Pancytopenia
Pancytopenia
Hepatic steatosis
Pancytopenia
Hepatic steatosis
Pancytopenia
Pancytopenia
Hepatic steatosis

## 2022-01-26 ENCOUNTER — TRANSCRIPTION ENCOUNTER (OUTPATIENT)
Age: 47
End: 2022-01-26

## 2022-01-26 VITALS
HEART RATE: 75 BPM | TEMPERATURE: 98 F | RESPIRATION RATE: 17 BRPM | SYSTOLIC BLOOD PRESSURE: 101 MMHG | OXYGEN SATURATION: 98 % | DIASTOLIC BLOOD PRESSURE: 61 MMHG

## 2022-01-26 LAB
ALBUMIN SERPL ELPH-MCNC: 3.4 G/DL — SIGNIFICANT CHANGE UP (ref 3.3–5)
ALP SERPL-CCNC: 168 U/L — HIGH (ref 40–120)
ALT FLD-CCNC: 84 U/L — HIGH (ref 10–45)
ANION GAP SERPL CALC-SCNC: 13 MMOL/L — SIGNIFICANT CHANGE UP (ref 5–17)
AST SERPL-CCNC: 70 U/L — HIGH (ref 10–40)
BILIRUB SERPL-MCNC: 2.3 MG/DL — HIGH (ref 0.2–1.2)
BUN SERPL-MCNC: 6 MG/DL — LOW (ref 7–23)
CALCIUM SERPL-MCNC: 8.9 MG/DL — SIGNIFICANT CHANGE UP (ref 8.4–10.5)
CHLORIDE SERPL-SCNC: 101 MMOL/L — SIGNIFICANT CHANGE UP (ref 96–108)
CO2 SERPL-SCNC: 21 MMOL/L — LOW (ref 22–31)
CREAT SERPL-MCNC: 0.59 MG/DL — SIGNIFICANT CHANGE UP (ref 0.5–1.3)
GLUCOSE SERPL-MCNC: 96 MG/DL — SIGNIFICANT CHANGE UP (ref 70–99)
HCT VFR BLD CALC: 27.4 % — LOW (ref 34.5–45)
HGB BLD-MCNC: 8.6 G/DL — LOW (ref 11.5–15.5)
MAGNESIUM SERPL-MCNC: 1.8 MG/DL — SIGNIFICANT CHANGE UP (ref 1.6–2.6)
MCHC RBC-ENTMCNC: 31.4 GM/DL — LOW (ref 32–36)
MCHC RBC-ENTMCNC: 33.7 PG — SIGNIFICANT CHANGE UP (ref 27–34)
MCV RBC AUTO: 107.5 FL — HIGH (ref 80–100)
NRBC # BLD: 0 /100 WBCS — SIGNIFICANT CHANGE UP (ref 0–0)
PHOSPHATE SERPL-MCNC: 3.1 MG/DL — SIGNIFICANT CHANGE UP (ref 2.5–4.5)
PLATELET # BLD AUTO: 427 K/UL — HIGH (ref 150–400)
POTASSIUM SERPL-MCNC: 3.9 MMOL/L — SIGNIFICANT CHANGE UP (ref 3.5–5.3)
POTASSIUM SERPL-SCNC: 3.9 MMOL/L — SIGNIFICANT CHANGE UP (ref 3.5–5.3)
PROT SERPL-MCNC: 5.8 G/DL — LOW (ref 6–8.3)
RBC # BLD: 2.55 M/UL — LOW (ref 3.8–5.2)
RBC # FLD: 20 % — HIGH (ref 10.3–14.5)
SODIUM SERPL-SCNC: 135 MMOL/L — SIGNIFICANT CHANGE UP (ref 135–145)
WBC # BLD: 7.51 K/UL — SIGNIFICANT CHANGE UP (ref 3.8–10.5)
WBC # FLD AUTO: 7.51 K/UL — SIGNIFICANT CHANGE UP (ref 3.8–10.5)

## 2022-01-26 PROCEDURE — 36430 TRANSFUSION BLD/BLD COMPNT: CPT

## 2022-01-26 PROCEDURE — 36415 COLL VENOUS BLD VENIPUNCTURE: CPT

## 2022-01-26 PROCEDURE — 83605 ASSAY OF LACTIC ACID: CPT

## 2022-01-26 PROCEDURE — 85025 COMPLETE CBC W/AUTO DIFF WBC: CPT

## 2022-01-26 PROCEDURE — 85027 COMPLETE CBC AUTOMATED: CPT

## 2022-01-26 PROCEDURE — 83036 HEMOGLOBIN GLYCOSYLATED A1C: CPT

## 2022-01-26 PROCEDURE — 97161 PT EVAL LOW COMPLEX 20 MIN: CPT

## 2022-01-26 PROCEDURE — 87389 HIV-1 AG W/HIV-1&-2 AB AG IA: CPT

## 2022-01-26 PROCEDURE — 93005 ELECTROCARDIOGRAM TRACING: CPT

## 2022-01-26 PROCEDURE — 80307 DRUG TEST PRSMV CHEM ANLYZR: CPT

## 2022-01-26 PROCEDURE — 87040 BLOOD CULTURE FOR BACTERIA: CPT

## 2022-01-26 PROCEDURE — 99239 HOSP IP/OBS DSCHRG MGMT >30: CPT | Mod: GC

## 2022-01-26 PROCEDURE — 82550 ASSAY OF CK (CPK): CPT

## 2022-01-26 PROCEDURE — 82607 VITAMIN B-12: CPT

## 2022-01-26 PROCEDURE — 70450 CT HEAD/BRAIN W/O DYE: CPT

## 2022-01-26 PROCEDURE — 71048 X-RAY EXAM CHEST 4+ VIEWS: CPT

## 2022-01-26 PROCEDURE — 84484 ASSAY OF TROPONIN QUANT: CPT

## 2022-01-26 PROCEDURE — 87635 SARS-COV-2 COVID-19 AMP PRB: CPT

## 2022-01-26 PROCEDURE — 85045 AUTOMATED RETICULOCYTE COUNT: CPT

## 2022-01-26 PROCEDURE — 80048 BASIC METABOLIC PNL TOTAL CA: CPT

## 2022-01-26 PROCEDURE — 87086 URINE CULTURE/COLONY COUNT: CPT

## 2022-01-26 PROCEDURE — 96375 TX/PRO/DX INJ NEW DRUG ADDON: CPT

## 2022-01-26 PROCEDURE — 82272 OCCULT BLD FECES 1-3 TESTS: CPT

## 2022-01-26 PROCEDURE — 82693 ASSAY OF ETHYLENE GLYCOL: CPT

## 2022-01-26 PROCEDURE — 97535 SELF CARE MNGMENT TRAINING: CPT

## 2022-01-26 PROCEDURE — 72131 CT LUMBAR SPINE W/O DYE: CPT

## 2022-01-26 PROCEDURE — 86780 TREPONEMA PALLIDUM: CPT

## 2022-01-26 PROCEDURE — 85730 THROMBOPLASTIN TIME PARTIAL: CPT

## 2022-01-26 PROCEDURE — 80320 DRUG SCREEN QUANTALCOHOLS: CPT

## 2022-01-26 PROCEDURE — 74176 CT ABD & PELVIS W/O CONTRAST: CPT

## 2022-01-26 PROCEDURE — 83690 ASSAY OF LIPASE: CPT

## 2022-01-26 PROCEDURE — 84443 ASSAY THYROID STIM HORMONE: CPT

## 2022-01-26 PROCEDURE — P9016: CPT

## 2022-01-26 PROCEDURE — 84100 ASSAY OF PHOSPHORUS: CPT

## 2022-01-26 PROCEDURE — 97530 THERAPEUTIC ACTIVITIES: CPT

## 2022-01-26 PROCEDURE — 82140 ASSAY OF AMMONIA: CPT

## 2022-01-26 PROCEDURE — 83935 ASSAY OF URINE OSMOLALITY: CPT

## 2022-01-26 PROCEDURE — 97116 GAIT TRAINING THERAPY: CPT

## 2022-01-26 PROCEDURE — 81001 URINALYSIS AUTO W/SCOPE: CPT

## 2022-01-26 PROCEDURE — 71045 X-RAY EXAM CHEST 1 VIEW: CPT

## 2022-01-26 PROCEDURE — 85610 PROTHROMBIN TIME: CPT

## 2022-01-26 PROCEDURE — 99285 EMERGENCY DEPT VISIT HI MDM: CPT

## 2022-01-26 PROCEDURE — 80074 ACUTE HEPATITIS PANEL: CPT

## 2022-01-26 PROCEDURE — 83735 ASSAY OF MAGNESIUM: CPT

## 2022-01-26 PROCEDURE — 97110 THERAPEUTIC EXERCISES: CPT

## 2022-01-26 PROCEDURE — 86923 COMPATIBILITY TEST ELECTRIC: CPT

## 2022-01-26 PROCEDURE — 82746 ASSAY OF FOLIC ACID SERUM: CPT

## 2022-01-26 PROCEDURE — 71250 CT THORAX DX C-: CPT

## 2022-01-26 PROCEDURE — 83930 ASSAY OF BLOOD OSMOLALITY: CPT

## 2022-01-26 PROCEDURE — 76705 ECHO EXAM OF ABDOMEN: CPT

## 2022-01-26 PROCEDURE — 86900 BLOOD TYPING SEROLOGIC ABO: CPT

## 2022-01-26 PROCEDURE — 97162 PT EVAL MOD COMPLEX 30 MIN: CPT

## 2022-01-26 PROCEDURE — 80053 COMPREHEN METABOLIC PANEL: CPT

## 2022-01-26 PROCEDURE — 86850 RBC ANTIBODY SCREEN: CPT

## 2022-01-26 PROCEDURE — 80076 HEPATIC FUNCTION PANEL: CPT

## 2022-01-26 PROCEDURE — 86901 BLOOD TYPING SEROLOGIC RH(D): CPT

## 2022-01-26 PROCEDURE — 82803 BLOOD GASES ANY COMBINATION: CPT

## 2022-01-26 PROCEDURE — 72125 CT NECK SPINE W/O DYE: CPT

## 2022-01-26 PROCEDURE — 96374 THER/PROPH/DIAG INJ IV PUSH: CPT

## 2022-01-26 PROCEDURE — 84702 CHORIONIC GONADOTROPIN TEST: CPT

## 2022-01-26 RX ORDER — LOPERAMIDE HCL 2 MG
2 TABLET ORAL DAILY
Refills: 0 | Status: DISCONTINUED | OUTPATIENT
Start: 2022-01-26 | End: 2022-01-26

## 2022-01-26 RX ORDER — MULTIVIT-MIN/FERROUS GLUCONATE 9 MG/15 ML
1 LIQUID (ML) ORAL
Qty: 0 | Refills: 0 | DISCHARGE
Start: 2022-01-26

## 2022-01-26 RX ORDER — LIDOCAINE 4 G/100G
0 CREAM TOPICAL
Qty: 0 | Refills: 0 | DISCHARGE
Start: 2022-01-26

## 2022-01-26 RX ORDER — ONDANSETRON 8 MG/1
1 TABLET, FILM COATED ORAL
Qty: 7 | Refills: 0
Start: 2022-01-26 | End: 2022-02-01

## 2022-01-26 RX ADMIN — Medication 1 MILLIGRAM(S): at 13:16

## 2022-01-26 RX ADMIN — PANTOPRAZOLE SODIUM 40 MILLIGRAM(S): 20 TABLET, DELAYED RELEASE ORAL at 06:54

## 2022-01-26 RX ADMIN — Medication 1 TABLET(S): at 13:17

## 2022-01-26 RX ADMIN — Medication 100 MILLIGRAM(S): at 13:17

## 2022-01-26 NOTE — DISCHARGE NOTE NURSING/CASE MANAGEMENT/SOCIAL WORK - NSDCPEFALRISK_GEN_ALL_CORE
For information on Fall & Injury Prevention, visit: [email or web address] OR  [email or web address] OR  https://www.cdc.gov/steadi/patient.html

## 2022-01-26 NOTE — DISCHARGE NOTE NURSING/CASE MANAGEMENT/SOCIAL WORK - PATIENT PORTAL LINK FT
You can access the FollowMyHealth Patient Portal offered by [facility] by registering at the following website: [email or web address]. By joining [facility]’s FollowMyHealth portal, you will also be able to view your health information using other applications (apps) compatible with our system.

## 2022-01-31 DIAGNOSIS — R07.89 OTHER CHEST PAIN: ICD-10-CM
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
## 2022-01-31 DIAGNOSIS — D53.9 NUTRITIONAL ANEMIA, UNSPECIFIED: ICD-10-CM

## 2022-01-31 DIAGNOSIS — K85.20 ALCOHOL INDUCED ACUTE PANCREATITIS WITHOUT NECROSIS OR INFECTION: ICD-10-CM

## 2022-01-31 DIAGNOSIS — E87.2 ACIDOSIS: ICD-10-CM

## 2022-01-31 DIAGNOSIS — K76.0 FATTY (CHANGE OF) LIVER, NOT ELSEWHERE CLASSIFIED: ICD-10-CM

## 2022-01-31 DIAGNOSIS — K29.00 ACUTE GASTRITIS WITHOUT BLEEDING: ICD-10-CM

## 2022-01-31 DIAGNOSIS — D69.6 THROMBOCYTOPENIA, UNSPECIFIED: ICD-10-CM

## 2022-01-31 DIAGNOSIS — S32.301A UNSPECIFIED FRACTURE OF RIGHT ILIUM, INITIAL ENCOUNTER FOR CLOSED FRACTURE: ICD-10-CM

## 2022-01-31 DIAGNOSIS — F10.239 ALCOHOL DEPENDENCE WITH WITHDRAWAL, UNSPECIFIED: ICD-10-CM

## 2022-01-31 DIAGNOSIS — K22.11 ULCER OF ESOPHAGUS WITH BLEEDING: ICD-10-CM

## 2022-01-31 DIAGNOSIS — K70.10 ALCOHOLIC HEPATITIS WITHOUT ASCITES: ICD-10-CM

## 2022-01-31 DIAGNOSIS — D62 ACUTE POSTHEMORRHAGIC ANEMIA: ICD-10-CM
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
## 2022-01-31 DIAGNOSIS — R74.01 ELEVATION OF LEVELS OF LIVER TRANSAMINASE LEVELS: ICD-10-CM

## 2022-01-31 DIAGNOSIS — E44.0 MODERATE PROTEIN-CALORIE MALNUTRITION: ICD-10-CM

## 2022-01-31 DIAGNOSIS — R94.31 ABNORMAL ELECTROCARDIOGRAM [ECG] [EKG]: ICD-10-CM

## 2022-01-31 DIAGNOSIS — D61.818 OTHER PANCYTOPENIA: ICD-10-CM

## 2022-01-31 DIAGNOSIS — E87.6 HYPOKALEMIA: ICD-10-CM

## 2022-02-09 ENCOUNTER — NON-APPOINTMENT (OUTPATIENT)
Age: 47
End: 2022-02-09

## 2022-02-09 ENCOUNTER — TRANSCRIPTION ENCOUNTER (OUTPATIENT)
Age: 47
End: 2022-02-09

## 2022-03-16 ENCOUNTER — APPOINTMENT (OUTPATIENT)
Dept: HEART AND VASCULAR | Facility: CLINIC | Age: 47
End: 2022-03-16

## 2022-03-16 ENCOUNTER — NON-APPOINTMENT (OUTPATIENT)
Age: 47
End: 2022-03-16

## 2022-03-16 ENCOUNTER — APPOINTMENT (OUTPATIENT)
Dept: NEUROLOGY | Facility: CLINIC | Age: 47
End: 2022-03-16
Payer: COMMERCIAL

## 2022-03-16 VITALS
HEART RATE: 84 BPM | BODY MASS INDEX: 20.44 KG/M2 | TEMPERATURE: 98.2 F | DIASTOLIC BLOOD PRESSURE: 75 MMHG | WEIGHT: 138 LBS | HEIGHT: 69 IN | SYSTOLIC BLOOD PRESSURE: 120 MMHG | OXYGEN SATURATION: 100 %

## 2022-03-16 DIAGNOSIS — R06.00 DYSPNEA, UNSPECIFIED: ICD-10-CM

## 2022-03-16 DIAGNOSIS — R07.89 OTHER CHEST PAIN: ICD-10-CM

## 2022-03-16 DIAGNOSIS — G62.9 POLYNEUROPATHY, UNSPECIFIED: ICD-10-CM

## 2022-03-16 PROCEDURE — 99214 OFFICE O/P EST MOD 30 MIN: CPT

## 2022-03-16 RX ORDER — DULOXETINE HYDROCHLORIDE 30 MG/1
30 CAPSULE, DELAYED RELEASE PELLETS ORAL
Qty: 30 | Refills: 3 | Status: ACTIVE | COMMUNITY
Start: 2022-03-16 | End: 1900-01-01

## 2022-03-16 RX ORDER — GABAPENTIN 100 MG
100 TABLET ORAL
Refills: 0 | Status: DISCONTINUED | COMMUNITY
End: 2022-03-16

## 2022-03-16 NOTE — HISTORY OF PRESENT ILLNESS
[FreeTextEntry1] : 47-year-old woman with history of alcohol dependence and recent admission to [facility] for alcohol withdrawal and upper GI bleeding who presents for evaluation of leg pain and swelling.\par \par She reports that both legs began to swell a few days before she was discharged from the hospital ~1 month ago and have continued to worsen since then.  They are also red and warm and she has sharp burning pains.  She had ultrasounds of bilateral LE which were negative for DVT.  She discussed the situation with a cousin who is a doctor who recommended evaluation for polyneuropathy.  She follows with cardiology but has not had an echo recently.  No weakness in legs.  No neck or back pain.  Has been taking gabapentin with no improvement.

## 2022-03-16 NOTE — ASSESSMENT
[FreeTextEntry1] : Leg pain, worsening\par \par She does have evidence of decreased small and large fiber sensation on exam, however this would not explain the swelling which is likely the biggest  of her pain.\par \par -Will check A1c, TSH, B12, SPEP/IF \par -Switch gabapentin to duloxetine 30 mg as gabapentin can worsen swelling\par -Recommend cardiology and nephrology evaluations for causes of edema

## 2022-03-16 NOTE — PHYSICAL EXAM
[FreeTextEntry1] : Physical Exam\par Constitutional: no apparent distress\par Psychiatric: normal affect, euthymic, alert and oriented x 3\par Extr: warm, erythematous, 2+ pitting edema to knees bilaterally\par \par Neurologic Exam:\par Mental Status: awake and alert, speech fluent and prosodic with no paraphasic errors\par Cranial Nerves: tracks in all directions, face symmetric, no dysarthria\par Motor: normal bulk and tone, no orbiting or pronator drift, power 5/5 to confrontation throughout including shoulder abduction, elbow flexion and extension, wrist flexion and extension, hip flexion, knee flexion and extension, no abnormal movements\par Sensation: decreased to temperature to mid-shins, absent vibration at big toes and ankles\par Coordination: finger-nose-finger intact bilaterally\par Reflexes: 2+ biceps, triceps, brachioradialis, patella\par Gait: narrow base, normal stance and stride, normal arm swing, normal tandem, negative Romberg\par

## 2022-03-17 LAB
ESTIMATED AVERAGE GLUCOSE: 100 MG/DL
HBA1C MFR BLD HPLC: 5.1 %
TSH SERPL-ACNC: 2.73 UIU/ML
VIT B12 SERPL-MCNC: 620 PG/ML

## 2022-03-21 LAB
ALBUMIN MFR SERPL ELPH: 56.7 %
ALBUMIN SERPL-MCNC: 3.6 G/DL
ALBUMIN/GLOB SERPL: 1.3 RATIO
ALPHA1 GLOB MFR SERPL ELPH: 4.3 %
ALPHA1 GLOB SERPL ELPH-MCNC: 0.3 G/DL
ALPHA2 GLOB MFR SERPL ELPH: 9.1 %
ALPHA2 GLOB SERPL ELPH-MCNC: 0.6 G/DL
B-GLOBULIN MFR SERPL ELPH: 15.4 %
B-GLOBULIN SERPL ELPH-MCNC: 1 G/DL
GAMMA GLOB FLD ELPH-MCNC: 0.9 G/DL
GAMMA GLOB MFR SERPL ELPH: 14.5 %
INTERPRETATION SERPL IEP-IMP: NORMAL
M PROTEIN SPEC IFE-MCNC: NORMAL
PROT SERPL-MCNC: 6.4 G/DL
PROT SERPL-MCNC: 6.4 G/DL

## 2022-03-23 ENCOUNTER — APPOINTMENT (OUTPATIENT)
Dept: NEPHROLOGY | Facility: CLINIC | Age: 47
End: 2022-03-23

## 2022-03-30 ENCOUNTER — APPOINTMENT (OUTPATIENT)
Dept: HEART AND VASCULAR | Facility: CLINIC | Age: 47
End: 2022-03-30

## 2022-04-05 ENCOUNTER — APPOINTMENT (OUTPATIENT)
Dept: NEPHROLOGY | Facility: CLINIC | Age: 47
End: 2022-04-05

## 2022-04-25 ENCOUNTER — APPOINTMENT (OUTPATIENT)
Age: 47
End: 2022-04-25

## 2022-05-10 NOTE — ED ADULT NURSE NOTE - PMH
Alcohol abuse    Anxiety    Depression     General appearance: No acute distress, Awake, Alert  Lungs: Clear to auscultation, Breath sound equal bilaterally  Cardiovascular: S1S2, Regular rhythm  Abdomen: Soft, Nontender, Positive bowel sounds  Extremities: No clubbing, No cyanosis, No edema

## 2022-05-18 ENCOUNTER — APPOINTMENT (OUTPATIENT)
Dept: NEPHROLOGY | Facility: CLINIC | Age: 47
End: 2022-05-18

## 2022-05-20 ENCOUNTER — APPOINTMENT (OUTPATIENT)
Dept: RHEUMATOLOGY | Facility: CLINIC | Age: 47
End: 2022-05-20

## 2022-06-20 NOTE — ED ADULT NURSE NOTE - SCORE
Assumed care of pt at 20:45 Pt A&Ox4 c/o fall s/p SOB and [facility]. Pt states this is his first event experiencing s/s like this and EMT stated that pt hit his head when he fell. Pt arrived with C-Collar on but is refusing it at this time taking it off himself. Pt denies blurred vision or dizziness at this time and denies any numbness or tingling. abdomen is soft and non tender, respirations are even and unlabored. resting comfortably on the stretcher at this time 10

## 2022-10-13 NOTE — ED ADULT NURSE NOTE - CAS EDN DISCHARGE ASSESSMENT
Spoke with patient and her son/MPOA, Samanta Found. Brief medical update provided. Shared with son that patient expressed being tired when we saw her earlier this morning. Patient remains tachycardic and tachypneic. Reviewed goals of care with Samanta Found who agreed for DNR/DNI. Order placed. POST form completed for DNR/DNI, limited interventions and sent to Samanta Joe for docusign signature at [email or web address]. Awaiting return of signed POST form. Samanta Found still planning to take patient home with the support of hospice services. Perfectserve message to attending and bedside RN notified. Goals of care are now DNR/DNI, limited interventions.     RUTHIE Rao-BC  Palliative Medicine Alert and oriented to person, place and time

## 2022-11-16 NOTE — PROGRESS NOTE ADULT - PROBLEM SELECTOR PLAN 10
F: None   E: Replete as necessary K>4 Mg>2  N: NPO MN, EGD AM     DVT Prophylaxis: None  GI prophylaxis: Protonix ggt  CODE STATUS: FULL alert

## 2022-12-16 NOTE — DIETITIAN INITIAL EVALUATION ADULT. - PROBLEM/PLAN-6
Goal Outcome Evaluation:      Vss overnight.  Pt wore bipap for an hour, but then refused to wear it the rest of the night. Remains on 6-8L hi-flow NC.              DISPLAY PLAN FREE TEXT

## 2023-01-27 NOTE — PROGRESS NOTE ADULT - PROBLEM SELECTOR PLAN 10
F: None   E: Replete as necessary K>4 Mg>2  N: Pureed    DVT Prophylaxis: None  GI prophylaxis: PO Protonix & Carafate  CODE STATUS: FULL Normal

## 2023-03-04 NOTE — ED ADULT NURSE NOTE - NS ED NURSE DISCH DISPOSITION
Returned call to bedside RN  Shayy Cabrales R.N.    THAD Meds as mentioned have not been ordered as of yet    I advise that I would not hold or change transport home as family could  Rx and/or [place] has delivery capabilities for a small fee.    She states understanding.   Discharged

## 2023-03-29 NOTE — BH CONSULTATION LIAISON ASSESSMENT NOTE - NSSUICRSKFACTOR_PSY_ALL_CORE
Current and Past Psychiatric Diagnoses/Treatment Related Factors Soolantra Pregnancy And Lactation Text: This medication is Pregnancy Category C. This medication is considered safe during breast feeding.

## 2023-04-19 NOTE — ED ADULT NURSE NOTE - PAIN RATING/NUMBER SCALE (0-10): REST
0 Banner Transposition Flap Text: The defect edges were debeveled with a #15 scalpel blade.  Given the location of the defect and the proximity to free margins a Banner transposition flap was deemed most appropriate.  Using a sterile surgical marker, an appropriate flap drawn around the defect. The area thus outlined was incised deep to adipose tissue with a #15 scalpel blade.  The skin margins were undermined to an appropriate distance in all directions utilizing iris scissors.

## 2023-07-11 NOTE — BH CONSULTATION LIAISON ASSESSMENT NOTE - RECORDS REVIEWED
Patient Education        Counting Your Baby's Kicks: Care Instructions  Overview     Counting your baby's kicks is one way your doctor can tell that your baby is healthy. You will probably feel your baby move for the first time between 16 and 22 weeks. The movement may feel like flutters rather than kicks. Your baby may move more at certain times of the day. When you are active, you may notice less kicking than when you are resting. At your prenatal visits, your doctor will ask whether the baby is active. In your last trimester, your doctor may ask you to count the number of times you feel your baby move. Follow-up care is a key part of your treatment and safety. Be sure to make and go to all appointments, and call your doctor if you are having problems. It's also a good idea to know your test results and keep a list of the medicines you take. How do you count fetal kicks? A common method of checking your baby's movement is to note the length of time it takes to count 10 movements (such as kicks, flutters, or rolls). Pick your baby's most active time of day to count. This may be any time from morning to evening. If you don't feel 10 movements in an hour, have something to eat or drink and count for another hour. If you don't feel at least 10 movements in the 2-hour period, call your doctor. Do not use an at-home Doppler heart monitor in place of counting fetal movements. When should you call for help? Call your doctor now or seek immediate medical care if:    You feel fewer than 10 movements in a 2-hour period. You noticed that your baby has stopped moving or is moving less than normal.   Watch closely for changes in your health, and be sure to contact your doctor if you have any problems. Where can you learn more? Go to http://www.woods.com/ and enter U048 to learn more about \"Counting Your Baby's Kicks: Care Instructions. \"  Current as of: November 9, 2022               Content
Hospital chart (includes HIE)

## 2023-10-13 NOTE — PROGRESS NOTE ADULT - PROBLEM SELECTOR PLAN 1
Few day history of nausea and coffee ground emesis with limited PO intake. Macrocytic anemia.   - On Octreotide & Protonix ggt  - EGD in AM Few day history of nausea and coffee ground emesis with limited PO intake. Macrocytic anemia.   - On Octreotide & Protonix ggt  - EGD 1/19 28-Sep-2023

## 2024-01-24 NOTE — ED PROVIDER NOTE - PROGRESS NOTE DETAILS
Anemia Management Note  SUBJECTIVE/OBJECTIVE:  Referred by Dr. Michael العلي on 10/01/2021  Primary Diagnosis: Anemia in Chronic Kidney Disease (N18.4, D63.1)     Secondary Diagnosis:  Chronic Kidney Disease, Stage 4 (N18.4)   Kidney Tx: 3/9/2008  Hgb goal range:  9-10  Epo/Darbo: Aranesp  25 mcg  every two weeks for Hgb <10.  In clinic  *dosed at 0.45mcg/kg  Iron regimen:  Ferrous Sulfate  once daily (started Oct 2021)  Labs : 10/4/2024  Recent PABLO use, transfusion, IV iron: NA  RX/TX plans : [id number]     Aranesp at [place] [phone] ([facility]).     No history of stroke, MI and blood clots. Hx of Angiosarcoma. Dr. العلي wants to treat with PABLO.      Contact:            No boxes checked on consent to communicate        Latest Ref Rng & Units 2023    10:17 AM 2023    10:28 AM 2023    11:18 AM 2023    10:05 AM 2023    10:31 AM 1/10/2024     9:53 AM 2024     9:55 AM   Anemia   PABLO Dose    25 mcg       Hemoglobin 11.7 - 15.7 g/dL 10.8  9.7   11.0  10.3  10.3  10.1    Ferritin 6 - 175 ng/mL    419         BP Readings from Last 3 Encounters:   23 132/87   23 116/79   23 131/86     Wt Readings from Last 2 Encounters:   23 65.8 kg (145 lb)   23 66.2 kg (145 lb 14.4 oz)         ASSESSMENT:  Hgb:Above goal - recommend hold dose  TSat: not at goal of >30% Ferritin: At goal (>100ng/mL)    PLAN:  Hold Aranesp and RTC for hgb then aranesp if needed in 2-4 week(s)    Orders needed to be renewed (for next follow-up date) in EPIC: None    Iron labs due: every 12 weeks, due in 2024     Plan discussed with:  HuddleApp message sent to Karolyn/caregiver      NEXT FOLLOW-UP DATE:  [id number] dose    Carrie Leopold, RN BSN  Anemia Services  [facility]  [email or web address]  Office: [phone]  Fax [phone]                
Pt has had this in past, She is not using her compression stockings, has not been taking her irona nd has not started the omeprazole yet. Will start those things and will follow up with pmd. Potassium replaced here. Precautions reviewed.

## 2024-08-28 NOTE — ED ADULT NURSE NOTE - NS ED NURSE LEVEL OF CONSCIOUSNESS SPEECH
Arline Mckinley MA  please Dr Franca Hernandez response as below.   Speaking Coherently/Age appropriate

## 2024-09-16 NOTE — ED ADULT TRIAGE NOTE - TEMPERATURE IN FAHRENHEIT (DEGREES F)
She was counseled on diet and exercise for elevated BMI which is affecting her  smoking hx .   
Disused stopping tobacco and prevention. PFT in future but antibiotic and cough meds today. CT lung as well.   
Working on quitting at risk of COPD, not samantha to stop. OK for CT lung screening lung cancer.   
98

## 2025-04-29 NOTE — PROGRESS NOTE ADULT - PROBLEM SELECTOR PLAN 3
n/a -patient reported a few episodes of coffee ground emesis on 6/29  -Patient reports a history of rectal bleeding in January 2021 and went to [facility]. EGD was planned at that time, but the patient left AMA  -Hgb of 10.9 in the ED, went down to 9.3 and improved to 9.6 today  -May be due to IVF, but will trend CBCs  -GI consulted, recommendations appreciated. No need for EGD at this time  -Continue Protonix BID and Carafate 1gm QID  - Monitor for HD instability and re consult GI for repeated hematemesis n/a Universal Safety Interventions

## 2025-05-19 NOTE — ED PROVIDER NOTE - TEMPLATE, MLM
"Noted per 5-16-25 neurosurgery note, patient's son reported that \"patient does not want to proceed with embo so cancelled PAC\".    Return msg sent back to Dr. Hansen with request for confirmation as to whether RAC visit still needed.  mg  " General

## 2025-06-06 NOTE — PROGRESS NOTE ADULT - PROBLEM/PLAN-7
"Relay     \"LVM - PATIENT REQUESTED CANCELLATION OF APPT ON 6/9. APPT CANCELLED. ALSO CANCELLED APPT ON 6/16 - PATIENT CAN BE TRANSFERRED TO OFFICE TO RESCHEDULE 3 VISIT SERIES FOR INJECTIONS\"                "
DISPLAY PLAN FREE TEXT